# Patient Record
Sex: FEMALE | Race: WHITE | NOT HISPANIC OR LATINO | Employment: FULL TIME | URBAN - METROPOLITAN AREA
[De-identification: names, ages, dates, MRNs, and addresses within clinical notes are randomized per-mention and may not be internally consistent; named-entity substitution may affect disease eponyms.]

---

## 2018-05-01 ENCOUNTER — OFFICE VISIT (OUTPATIENT)
Dept: OBGYN CLINIC | Facility: CLINIC | Age: 58
End: 2018-05-01
Payer: COMMERCIAL

## 2018-05-01 VITALS — DIASTOLIC BLOOD PRESSURE: 96 MMHG | WEIGHT: 271.5 LBS | SYSTOLIC BLOOD PRESSURE: 146 MMHG

## 2018-05-01 DIAGNOSIS — Z01.419 ENCOUNTER FOR ANNUAL ROUTINE GYNECOLOGICAL EXAMINATION: Primary | ICD-10-CM

## 2018-05-01 DIAGNOSIS — Z12.31 ENCOUNTER FOR SCREENING MAMMOGRAM FOR MALIGNANT NEOPLASM OF BREAST: ICD-10-CM

## 2018-05-01 PROCEDURE — S0612 ANNUAL GYNECOLOGICAL EXAMINA: HCPCS | Performed by: OBSTETRICS & GYNECOLOGY

## 2018-05-01 NOTE — PROGRESS NOTES
ASSESSMENT & PLAN: Annita Okeefe is a 66 y o  [id number] with normal gynecologic exam     1   Routine well woman exam done today  2  Pap and HPV:  The patient's Pap and cotesting was not done today  Current ASCCP Guidelines reviewed  Had Hysterectomy 20 years ago for benign reasons   3  Mammogram ordered  4  Colonoscopy declined   5  The following were reviewed in today's visit: breast self exam, mammography screening ordered, menopause, exercise and healthy diet    CC:  Annual Gynecologic Examination    HPI: Annita Okeefe is a 66 y o  [id number] who presents for annual gynecologic examination  She has the following concerns:  None     Health Maintenance:    She wears her seatbelt routinely  She does perform regular monthly self breast exams  She feels safe at home  History reviewed  No pertinent past medical history  Past Surgical History:   Procedure Laterality Date    TOTAL ABDOMINAL HYSTERECTOMY      TUBAL LIGATION      WISDOM TOOTH EXTRACTION         Past OB/Gyn History:  OB History      Para Term  AB Living    6 3 3   3 3    SAB TAB Ectopic Multiple Live Births    1 2     3          History reviewed  No pertinent family history  Social History:  Social History     Social History    Marital status: Single     Spouse name: N/A    Number of children: N/A    Years of education: N/A     Occupational History    Not on file       Social History Main Topics    Smoking status: Never Smoker    Smokeless tobacco: Never Used    Alcohol use No    Drug use: No    Sexual activity: Yes     Partners: Male     Other Topics Concern    Not on file     Social History Narrative    No narrative on file       No Known Allergies    Current Outpatient Prescriptions:     LOW-DOSE ASPIRIN PO, Take by mouth, Disp: , Rfl:     Multiple Vitamins-Minerals (MULTIVITAMIN ADULT PO), Take by mouth, Disp: , Rfl:       Review of Systems  Constitutional :no fever, feels well, no tiredness, no recent weight gain or loss  ENT: no ear ache, no loss of hearing, no nosebleeds or nasal discharge, no sore throat or hoarseness  Cardiovascular: no complaints of slow or fast heart beat, no chest pain, no palpitations, no leg claudication or lower extremity edema  Respiratory: no complaints of shortness of shortness of breath, no MCMANUS  Breasts:no complaints of breast pain, breast lump, or nipple discharge  Gastrointestinal: no complaints of abdominal pain, constipation, nausea, vomiting, or diarrhea or bloody stools  Genitourinary : no complaints of dysuria, incontinence, pelvic pain, no dysmenorrhea, vaginal discharge or abnormal vaginal bleeding and as noted in HPI  Musculoskeletal: no complaints of arthralgia, no myalgia, no joint swelling or stiffness, no limb pain or swelling  Integumentary: no complaints of skin rash or lesion, itching or dry skin  Neurological: no complaints of headache, no confusion, no numbness or tingling, no dizziness or fainting    Objective      /96   Wt 123 kg (271 lb 8 oz)   Breastfeeding? No     General:   appears stated age, cooperative, alert normal mood and affect   Neck: normal, supple,trachea midline, no masses   Heart: regular rate and rhythm, S1, S2 normal, no murmur, click, rub or gallop   Lungs: clear to auscultation bilaterally   Breasts: normal appearance, no masses or tenderness, Normal to palpation without dominant masses   Abdomen: soft, non-tender, without masses or organomegaly   Vulva: normal, no lesions   Vagina: normal vagina, no discharge, exudate, lesion, or erythema   Urethra: normal   Cervix: Absent  Uterus: uterus absent   Adnexa: no mass, fullness, tenderness   Lymphatic palpation of lymph nodes in neck, axilla, groin and/or other locations: no lymphadenopathy or masses noted   Skin normal skin turgor and no rashes     Psychiatric orientation to person, place, and time: normal  mood and affect: normal

## 2018-05-02 PROBLEM — E55.9 VITAMIN D DEFICIENCY: Status: ACTIVE | Noted: 2018-05-02

## 2018-05-02 NOTE — PROGRESS NOTES
Assessment/Plan:    No problem-specific Assessment & Plan notes found for this encounter  Diagnoses and all orders for this visit:    S/p partial hysterectomy with remaining cervical stump  Comments:  Pt is following with Jossie Dean of GYN    Elevated BP without diagnosis of hypertension  Comments:  Bp 150/98 today pt reports it was high as reported by Gyn last week  Pt advised to return with bp log from home 3 weeks    Vitamin D deficiency  Comments:  Labwork ordered  Orders:  -     Vitamin D 25 hydroxy; Future    Screen for colon cancer  Comments:  Pt declines GI referral   Pt agrees with FOBT test    Other fatigue  Comments:  Labwork ordered  Orders:  -     TSH, 3rd generation with T4 reflex; Future    Encounter for preventative adult health care examination  Comments:  Labwork ordered  Orders:  -     CBC and differential; Future  -     Comprehensive metabolic panel    Screening for hyperlipidemia  Comments:  Labwork ordered  Orders:  -     UA w Reflex to Microscopic w Reflex to Culture  -     Lipid panel; Future    Screening for diabetes mellitus  Comments:  Labwork ordered  Orders:  -     Comprehensive metabolic panel    Screening for colon cancer  Comments:  Pt declined GI referral but is agreeable to FOBT test  Orders:  -     Occult Bloood,Fecal Immunochemical; Future  -     Ambulatory referral to Gastroenterology; Future    Screening for breast cancer  -     Mammo diagnostic bilateral w cad; Future    Other orders  -     Cancel: Ambulatory referral to Gynecology; Future          Subjective:      Patient ID: Renay Whittaker is a 66 y o  female here to [facility] pt reports she has not been following with a PCP for years     Pt reports she had a visit with Jossie Dean for gyn and was advised that since she does not have a uterus she does not need one      HPI    The following portions of the patient's history were reviewed and updated as appropriate:   She  has no past medical history on file   She   Patient Active Problem List    Diagnosis Date Noted    S/p partial hysterectomy with remaining cervical stump 05/03/2018    Vitamin D deficiency 05/02/2018     She  has a past surgical history that includes Total abdominal hysterectomy; [place] tooth extraction; and Tubal ligation  Her family history includes Hypertension in her father  She  reports that she has never smoked  She has never used smokeless tobacco  She reports that she does not drink alcohol or use drugs  Current Outpatient Prescriptions   Medication Sig Dispense Refill    LOW-DOSE ASPIRIN PO Take by mouth      Multiple Vitamins-Minerals (MULTIVITAMIN ADULT PO) Take by mouth       No current facility-administered medications for this visit  Current Outpatient Prescriptions on File Prior to Visit   Medication Sig    LOW-DOSE ASPIRIN PO Take by mouth    Multiple Vitamins-Minerals (MULTIVITAMIN ADULT PO) Take by mouth     No current facility-administered medications on file prior to visit  She has No Known Allergies       Review of Systems   Constitutional: Negative for fatigue  HENT: Negative for congestion, postnasal drip, rhinorrhea, sinus pain, sore throat and trouble swallowing  Eyes: Negative for pain and visual disturbance  Respiratory: Negative for cough, shortness of breath and wheezing  Cardiovascular: Negative for chest pain and palpitations  Gastrointestinal: Negative for constipation, diarrhea, nausea and vomiting  Endocrine: Negative for polydipsia, polyphagia and polyuria  Genitourinary: Negative for difficulty urinating, flank pain, frequency and pelvic pain  Musculoskeletal: Negative for arthralgias, back pain, joint swelling and myalgias  Skin: Negative  Negative for color change and rash  Neurological: Negative for dizziness, syncope, weakness, light-headedness, numbness and headaches  Hematological: Negative for adenopathy  Does not bruise/bleed easily     Psychiatric/Behavioral: Negative for behavioral problems and sleep disturbance  The patient is not nervous/anxious  Objective:      /98 (BP Location: Left arm, Patient Position: Sitting, Cuff Size: Large)   Pulse 82   Temp 98 8 °F (37 1 °C) (Tympanic)   Ht 5' 5" (1 651 m)   Wt 123 kg (272 lb)   SpO2 96%   BMI 45 26 kg/m²          Physical Exam   Constitutional: She is oriented to person, place, and time  She appears well-developed and well-nourished  HENT:   Head: Normocephalic  Eyes: Pupils are equal, round, and reactive to light  Neck: Normal range of motion  Cardiovascular: Normal rate and regular rhythm  Pulmonary/Chest: Effort normal and breath sounds normal    Abdominal: Soft  Bowel sounds are normal    Musculoskeletal: Normal range of motion  Neurological: She is alert and oriented to person, place, and time  Skin: Skin is warm and dry  Psychiatric: She has a normal mood and affect  Her behavior is normal  Judgment and thought content normal    Nursing note and vitals reviewed

## 2018-05-03 ENCOUNTER — OFFICE VISIT (OUTPATIENT)
Dept: FAMILY MEDICINE CLINIC | Facility: CLINIC | Age: 58
End: 2018-05-03
Payer: COMMERCIAL

## 2018-05-03 VITALS
SYSTOLIC BLOOD PRESSURE: 150 MMHG | HEART RATE: 82 BPM | HEIGHT: 65 IN | WEIGHT: 272 LBS | OXYGEN SATURATION: 96 % | BODY MASS INDEX: 45.32 KG/M2 | TEMPERATURE: 98.8 F | DIASTOLIC BLOOD PRESSURE: 98 MMHG

## 2018-05-03 DIAGNOSIS — Z90.711 S/P PARTIAL HYSTERECTOMY WITH REMAINING CERVICAL STUMP: Primary | ICD-10-CM

## 2018-05-03 DIAGNOSIS — R03.0 ELEVATED BP WITHOUT DIAGNOSIS OF HYPERTENSION: ICD-10-CM

## 2018-05-03 DIAGNOSIS — Z12.11 SCREENING FOR COLON CANCER: ICD-10-CM

## 2018-05-03 DIAGNOSIS — R53.83 OTHER FATIGUE: ICD-10-CM

## 2018-05-03 DIAGNOSIS — Z12.11 SCREEN FOR COLON CANCER: ICD-10-CM

## 2018-05-03 DIAGNOSIS — Z13.1 SCREENING FOR DIABETES MELLITUS: ICD-10-CM

## 2018-05-03 DIAGNOSIS — E55.9 VITAMIN D DEFICIENCY: ICD-10-CM

## 2018-05-03 DIAGNOSIS — Z00.00 ENCOUNTER FOR PREVENTATIVE ADULT HEALTH CARE EXAMINATION: ICD-10-CM

## 2018-05-03 DIAGNOSIS — Z13.220 SCREENING FOR HYPERLIPIDEMIA: ICD-10-CM

## 2018-05-03 DIAGNOSIS — Z12.39 SCREENING FOR BREAST CANCER: ICD-10-CM

## 2018-05-03 PROCEDURE — 99204 OFFICE O/P NEW MOD 45 MIN: CPT | Performed by: NURSE PRACTITIONER

## 2018-05-03 NOTE — PATIENT INSTRUCTIONS
Please return with bp log to next visit      DASH Eating Plan   WHAT YOU NEED TO KNOW:   The DASH (Dietary Approaches to Stop Hypertension) Eating Plan is designed to help prevent or lower high blood pressure  It can also help to lower LDL (bad) cholesterol and decrease your risk of heart disease  The plan is low in sodium, sugar, unhealthy fats, and total fat  It is high in potassium, calcium, magnesium, and fiber  These nutrients are added when you eat more fruits, vegetables, and whole grains  DISCHARGE INSTRUCTIONS:   Your sodium limit each day: Your dietitian will tell you how much sodium is safe for you to have each day  People with high blood pressure should have no more than 1,500 to 2,300 mg of sodium in a day  A teaspoon (tsp) of salt has 2,300 mg of sodium  This may seem like a difficult goal, but small changes to the foods you eat can make a big difference  Your healthcare provider or dietitian can help you create a meal plan that follows your sodium limit  How to limit sodium:   · Read food labels  Food labels can help you choose foods that are low in sodium  The amount of sodium is listed in milligrams (mg)  The % Daily Value (DV) column tells you how much of your daily needs are met by 1 serving of the food for each nutrient listed  Choose foods that have less than 5% of the DV of sodium  These foods are considered low in sodium  Foods that have 20% or more of the DV of sodium are considered high in sodium  Avoid foods that have more than 300 mg of sodium in each serving  Choose foods that say low-sodium, reduced-sodium, or no salt added on the food label  · Avoid salt  Do not salt food at the table, and add very little salt to foods during cooking  Use herbs and spices, such as onions, garlic, and salt-free seasonings to add flavor to foods  Try lemon or lime juice or vinegar to give foods a tart flavor   Use hot peppers or a small amount of hot pepper sauce to add a spicy flavor to foods      · Ask about salt substitutes  Ask your healthcare provider if you may use salt substitutes  Some salt substitutes have ingredients that can be harmful if you have certain health conditions  · Choose foods carefully at restaurants  Meals from restaurants, especially fast food restaurants, are often high in sodium  Some restaurants have nutrition information that tells you the amount of sodium in their foods  Ask to have your food prepared with less, or no salt  What you need to know about fats:   · Include healthy fats  Examples are unsaturated fats and omega-3 fatty acids  Unsaturated fats are found in soybean, canola, olive, or sunflower oil, and liquid and soft tub margarines  Omega-3 fatty acids are found in fatty fish, such as salmon, tuna, mackerel, and sardines  It is also found in flaxseed oil and ground flaxseed  · Avoid unhealthy fats  Do not eat unhealthy fats, such as saturated fats and trans fats  Saturated fats are found in foods that contain fat from animals  Examples are fatty meats, whole milk, butter, cream, and other dairy foods  It is also found in shortening, stick margarine, palm oil, and coconut oil  Trans fats are found in fried foods, crackers, chips, and baked goods made with margarine or shortening  Foods to include: With the DASH eating plan, you need to eat a certain number of servings from each food group  This will help you get enough of certain nutrients and limit others  The amount of servings you should eat depends on how many calories you need  Your dietitian can tell you how many calories you need  The number of servings listed next to the food groups below are for people who need about 2,000 calories each day    · Grains:  6 to 8 servings (3 of these servings should be whole-grain foods)    ¨ 1 slice of whole-grain bread     ¨ 1 ounce of dry cereal    ¨ ½ cup of cooked cereal, pasta, or brown rice    · Vegetables and fruits:  4 to 5 servings of fruits and 4 to 5 servings of vegetables    ¨ 1 medium fruit    ¨ ½ cup of frozen, canned (no added salt), or chopped fresh vegetables     ¨ ½ cup of fresh, frozen, dried, or canned fruit (canned in light syrup or fruit juice)    ¨ ½ cup of vegetable or fruit juice    · Dairy:  2 to 3 servings    ¨ 1 cup of nonfat (skim) or 1% milk    ¨ 1½ ounces of fat-free or low-fat cheese    ¨ 6 ounces of nonfat or low-fat yogurt    · Lean meat, poultry, and fish:  6 ounces or less    [employer] (chicken, turkey) with no skin    ¨ Fish (especially fatty fish, such as salmon, fresh tuna, or mackerel)    ¨ Lean beef and pork (loin, round, extra lean hamburger)    ¨ Egg whites and egg substitutes    · Nuts, seeds, and legumes:  4 to 5 servings each week    ¨ ½ cup of cooked beans and peas    ¨ 1½ ounces of unsalted nuts    ¨ 2 tablespoons of peanut butter or seeds    · Sweets and added sugars:  5 or less each week    ¨ 1 tablespoon of sugar, jelly, or jam    ¨ ½ cup of sorbet or gelatin    ¨ 1 cup of lemonade    · Fats:  2 to 3 servings each week    ¨ 1 teaspoon of soft margarine or vegetable oil    ¨ 1 tablespoon of mayonnaise    ¨ 2 tablespoons of salad dressing  Foods to avoid:   · Grains:      [employer], such as doughnuts, pastries, cookies, and biscuits (high in fat and sugar)    ¨ Mixes for cornbread and biscuits, packaged foods, such as bread stuffing, rice and pasta mixes, macaroni and cheese, and instant cereals (high in sodium)    · Fruits and vegetables:      ¨ Regular, canned vegetables (high in sodium)    ¨ Sauerkraut, pickled vegetables, and other foods prepared in brine (high in sodium)    ¨ Fried vegetables or vegetables in butter or high-fat sauces    ¨ Fruit in cream or butter sauce (high in fat)    · Dairy:      ¨ Whole milk, 2% milk, and cream (high in fat)    ¨ Regular cheese and processed cheese (high in fat and sodium)    · Meats and protein foods:      ¨ Smoked or cured meat, such as corned beef, montoya, ham, hot dogs, and sausage (high in fat and sodium)    ¨ Canned beans and canned meats or spreads, such as potted meats, sardines, anchovies, and imitation seafood (high in sodium)    ¨ Deli or lunch meats, such as bologna, ham, turkey, and roast beef (high in sodium)    ¨ High-fat meat (T-bone steak, regular hamburger, and ribs)    ¨ Whole eggs and egg yolks (high in fat)    · Other:      ¨ Seasonings made with salt, such as garlic salt, celery salt, onion salt, seasoned salt, meat tenderizers, and monosodium glutamate (MSG)    ¨ Miso soup and canned or dried soup mixes (high in sodium)    ¨ Regular soy sauce, barbecue sauce, teriyaki sauce, steak sauce, Worcestershire sauce, and most flavored vinegars (high in sodium)    ¨ Regular condiments, such as mustard, ketchup, and salad dressings (high in sodium)    ¨ Gravy and sauces, such as Earl or cheese sauces (high in sodium and fat)    ¨ Drinks high in sugar, such as soda or fruit drinks    [employer] foods, such as salted chips, popcorn, pretzels, pork rinds, salted crackers, and salted nuts    ¨ Frozen foods, such as dinners, entrees, vegetables with sauces, and breaded meats (high in sodium)  Other guidelines to follow:   · Maintain a healthy weight  Your risk for heart disease is higher if you are overweight  Your healthcare provider may suggest that you lose weight if you are overweight  You can lose weight by eating fewer calories and foods that have added sugars and fat  The DASH meal plan can help you do this  Decrease calories by eating smaller portions at each meal and fewer snacks  Ask your healthcare provider for more information about how to lose weight  · Exercise regularly  Regular exercise can help you reach or maintain a healthy weight  Regular exercise can also help decrease your blood pressure and improve your cholesterol levels  Get 30 minutes or more of moderate exercise each day of the week  To lose weight, get at least 60 minutes of exercise   Talk to your healthcare provider about the best exercise program for you  · Limit alcohol  Women should limit alcohol to 1 drink a day  Men should limit alcohol to 2 drinks a day  A drink of alcohol is 12 ounces of beer, 5 ounces of wine, or 1½ ounces of liquor  © 2017 [address] Information is for End User's use only and may not be sold, redistributed or otherwise used for commercial purposes  All illustrations and images included in CareNotes® are the copyrighted property of A D A M [employer] Inc  or Evan Douglass  The above information is an  only  It is not intended as medical advice for individual conditions or treatments  Talk to your doctor, nurse or pharmacist before following any medical regimen to see if it is safe and effective for you

## 2018-05-09 ENCOUNTER — APPOINTMENT (OUTPATIENT)
Dept: LAB | Facility: HOSPITAL | Age: 58
End: 2018-05-09
Payer: COMMERCIAL

## 2018-05-09 DIAGNOSIS — Z13.220 SCREENING FOR HYPERLIPIDEMIA: ICD-10-CM

## 2018-05-09 DIAGNOSIS — Z00.00 ENCOUNTER FOR PREVENTATIVE ADULT HEALTH CARE EXAMINATION: ICD-10-CM

## 2018-05-09 DIAGNOSIS — E55.9 VITAMIN D DEFICIENCY: ICD-10-CM

## 2018-05-09 DIAGNOSIS — R53.83 OTHER FATIGUE: ICD-10-CM

## 2018-05-09 LAB
25(OH)D3 SERPL-MCNC: 11.5 NG/ML (ref 30–100)
ALBUMIN SERPL BCP-MCNC: 3.9 G/DL (ref 3.5–5)
ALP SERPL-CCNC: 104 U/L (ref 46–116)
ALT SERPL W P-5'-P-CCNC: 31 U/L (ref 12–78)
ANION GAP SERPL CALCULATED.3IONS-SCNC: 9 MMOL/L (ref 4–13)
AST SERPL W P-5'-P-CCNC: 23 U/L (ref 5–45)
BASOPHILS # BLD AUTO: 0.04 THOUSANDS/ΜL (ref 0–0.1)
BASOPHILS NFR BLD AUTO: 1 % (ref 0–1)
BILIRUB SERPL-MCNC: 0.4 MG/DL (ref 0.2–1)
BILIRUB UR QL STRIP: NEGATIVE
BUN SERPL-MCNC: 14 MG/DL (ref 5–25)
CALCIUM SERPL-MCNC: 9.4 MG/DL (ref 8.3–10.1)
CHLORIDE SERPL-SCNC: 106 MMOL/L (ref 100–108)
CHOLEST SERPL-MCNC: 284 MG/DL (ref 50–200)
CLARITY UR: CLEAR
CO2 SERPL-SCNC: 29 MMOL/L (ref 21–32)
COLOR UR: YELLOW
CREAT SERPL-MCNC: 1.01 MG/DL (ref 0.6–1.3)
EOSINOPHIL # BLD AUTO: 0.12 THOUSAND/ΜL (ref 0–0.61)
EOSINOPHIL NFR BLD AUTO: 2 % (ref 0–6)
ERYTHROCYTE [DISTWIDTH] IN BLOOD BY AUTOMATED COUNT: 13.5 % (ref 11.6–15.1)
GFR SERPL CREATININE-BSD FRML MDRD: 62 ML/MIN/1.73SQ M
GLUCOSE P FAST SERPL-MCNC: 80 MG/DL (ref 65–99)
GLUCOSE UR STRIP-MCNC: NEGATIVE MG/DL
HCT VFR BLD AUTO: 51.4 % (ref 34.8–46.1)
HDLC SERPL-MCNC: 64 MG/DL (ref 40–60)
HGB BLD-MCNC: 17.7 G/DL (ref 11.5–15.4)
HGB UR QL STRIP.AUTO: NEGATIVE
KETONES UR STRIP-MCNC: NEGATIVE MG/DL
LDLC SERPL CALC-MCNC: 191 MG/DL (ref 0–100)
LEUKOCYTE ESTERASE UR QL STRIP: NEGATIVE
LYMPHOCYTES # BLD AUTO: 2.27 THOUSANDS/ΜL (ref 0.6–4.47)
LYMPHOCYTES NFR BLD AUTO: 42 % (ref 14–44)
MCH RBC QN AUTO: 29.6 PG (ref 26.8–34.3)
MCHC RBC AUTO-ENTMCNC: 34.4 G/DL (ref 31.4–37.4)
MCV RBC AUTO: 86 FL (ref 82–98)
MONOCYTES # BLD AUTO: 0.34 THOUSAND/ΜL (ref 0.17–1.22)
MONOCYTES NFR BLD AUTO: 6 % (ref 4–12)
NEUTROPHILS # BLD AUTO: 2.61 THOUSANDS/ΜL (ref 1.85–7.62)
NEUTS SEG NFR BLD AUTO: 49 % (ref 43–75)
NITRITE UR QL STRIP: NEGATIVE
NONHDLC SERPL-MCNC: 220 MG/DL
PH UR STRIP.AUTO: 7 [PH] (ref 4.5–8)
PLATELET # BLD AUTO: 191 THOUSANDS/UL (ref 149–390)
PMV BLD AUTO: 9.7 FL (ref 8.9–12.7)
POTASSIUM SERPL-SCNC: 3.7 MMOL/L (ref 3.5–5.3)
PROT SERPL-MCNC: 7 G/DL (ref 6.4–8.2)
PROT UR STRIP-MCNC: NEGATIVE MG/DL
RBC # BLD AUTO: 5.98 MILLION/UL (ref 3.81–5.12)
SODIUM SERPL-SCNC: 144 MMOL/L (ref 136–145)
SP GR UR STRIP.AUTO: 1.01 (ref 1–1.03)
T4 FREE SERPL-MCNC: 1.17 NG/DL (ref 0.76–1.46)
TRIGL SERPL-MCNC: 145 MG/DL
TSH SERPL DL<=0.05 MIU/L-ACNC: 5.66 UIU/ML (ref 0.36–3.74)
UROBILINOGEN UR QL STRIP.AUTO: 0.2 E.U./DL
WBC # BLD AUTO: 5.38 THOUSAND/UL (ref 4.31–10.16)

## 2018-05-09 PROCEDURE — 80061 LIPID PANEL: CPT

## 2018-05-09 PROCEDURE — 81003 URINALYSIS AUTO W/O SCOPE: CPT | Performed by: NURSE PRACTITIONER

## 2018-05-09 PROCEDURE — 84439 ASSAY OF FREE THYROXINE: CPT

## 2018-05-09 PROCEDURE — 36415 COLL VENOUS BLD VENIPUNCTURE: CPT

## 2018-05-09 PROCEDURE — 84443 ASSAY THYROID STIM HORMONE: CPT

## 2018-05-09 PROCEDURE — 82306 VITAMIN D 25 HYDROXY: CPT

## 2018-05-09 PROCEDURE — 85025 COMPLETE CBC W/AUTO DIFF WBC: CPT

## 2018-05-09 PROCEDURE — 80053 COMPREHEN METABOLIC PANEL: CPT | Performed by: NURSE PRACTITIONER

## 2018-05-15 ENCOUNTER — HOSPITAL ENCOUNTER (OUTPATIENT)
Dept: MAMMOGRAPHY | Facility: HOSPITAL | Age: 58
Discharge: HOME | End: 2018-05-15
Attending: OBSTETRICS & GYNECOLOGY
Payer: COMMERCIAL

## 2018-05-15 DIAGNOSIS — Z12.31 ENCOUNTER FOR SCREENING MAMMOGRAM FOR MALIGNANT NEOPLASM OF BREAST: ICD-10-CM

## 2018-05-15 PROCEDURE — 77067 SCR MAMMO BI INCL CAD: CPT

## 2018-05-15 PROCEDURE — 77063 BREAST TOMOSYNTHESIS BI: CPT

## 2018-05-24 ENCOUNTER — OFFICE VISIT (OUTPATIENT)
Dept: FAMILY MEDICINE CLINIC | Facility: CLINIC | Age: 58
End: 2018-05-24
Payer: COMMERCIAL

## 2018-05-24 VITALS
DIASTOLIC BLOOD PRESSURE: 90 MMHG | TEMPERATURE: 98.4 F | BODY MASS INDEX: 46.52 KG/M2 | SYSTOLIC BLOOD PRESSURE: 142 MMHG | HEIGHT: 65 IN | WEIGHT: 279.2 LBS | OXYGEN SATURATION: 97 % | HEART RATE: 73 BPM

## 2018-05-24 DIAGNOSIS — E55.9 VITAMIN D DEFICIENCY: ICD-10-CM

## 2018-05-24 DIAGNOSIS — E03.9 ACQUIRED HYPOTHYROIDISM: ICD-10-CM

## 2018-05-24 DIAGNOSIS — D75.1 POLYCYTHEMIA: ICD-10-CM

## 2018-05-24 DIAGNOSIS — E78.2 MIXED HYPERLIPIDEMIA: ICD-10-CM

## 2018-05-24 DIAGNOSIS — I10 ELEVATED BLOOD PRESSURE READING IN OFFICE WITH WHITE COAT SYNDROME, WITH DIAGNOSIS OF HYPERTENSION: Primary | ICD-10-CM

## 2018-05-24 PROCEDURE — 99214 OFFICE O/P EST MOD 30 MIN: CPT | Performed by: NURSE PRACTITIONER

## 2018-05-24 PROCEDURE — 3008F BODY MASS INDEX DOCD: CPT | Performed by: NURSE PRACTITIONER

## 2018-05-24 RX ORDER — ERGOCALCIFEROL 1.25 MG/1
50000 CAPSULE ORAL WEEKLY
Qty: 12 CAPSULE | Refills: 3 | Status: SHIPPED | OUTPATIENT
Start: 2018-05-24 | End: 2018-11-21 | Stop reason: SDUPTHER

## 2018-05-24 RX ORDER — LEVOTHYROXINE SODIUM 0.03 MG/1
25 TABLET ORAL DAILY
Qty: 30 TABLET | Refills: 5 | Status: SHIPPED | OUTPATIENT
Start: 2018-05-24 | End: 2018-11-19 | Stop reason: SDUPTHER

## 2018-05-24 RX ORDER — ROSUVASTATIN CALCIUM 10 MG/1
10 TABLET, COATED ORAL DAILY
Qty: 30 TABLET | Refills: 5 | Status: SHIPPED | OUTPATIENT
Start: 2018-05-24 | End: 2018-11-21 | Stop reason: SDUPTHER

## 2018-05-24 NOTE — PROGRESS NOTES
Assessment/Plan:    No problem-specific Assessment & Plan notes found for this encounter  Diagnoses and all orders for this visit:    Elevated blood pressure reading in office with white coat syndrome, with diagnosis of hypertension  Comments:  Pt did monitor her bp at home avg 122/70 although elevated in the office 142/90 will cont to monitor    Mixed hyperlipidemia  Comments: Total chol 298    Pt requested medication at this time, is experiencing difficulty losing weight and excess fatigue  will start CrestorLabwork ordered 6   Orders:  -     Lipid panel; Future  -     rosuvastatin (CRESTOR) 10 MG tablet; Take 1 tablet (10 mg total) by mouth daily    Vitamin D deficiency  Comments:  Vitamin D def 11 7 Rx for Vitamin D 50,000 weekly provided  Orders:  -     ergocalciferol (VITAMIN D2) 50,000 units; Take 1 capsule (50,000 Units total) by mouth once a week  -     Vitamin D 25 hydroxy; Future    Acquired hypothyroidism  Comments:  TSH 5 65 Rx for Levothyroxine provided  Labwork ordered 6 mos  Orders:  -     levothyroxine 25 mcg tablet; Take 1 tablet (25 mcg total) by mouth daily  -     TSH, 3rd generation with T4 reflex; Future    Polycythemia  Comments:  Hgb 17 7   Hct 51 4  Labwork ordered 6 mos- will cont to monitor  Orders:  -     CBC (Includes Diff/PLT) With Smear Review; Future          Subjective:      Patient ID: Srinivasa Rai is a 66 y o  female here for f/u review of labs  Vitamin D def noted  Hypothyroidism noted  Elevated H&H Polycythemia  Will repeat labs in 6 mos    HPI    The following portions of the patient's history were reviewed and updated as appropriate:   She  has no past medical history on file    She   Patient Active Problem List    Diagnosis Date Noted    Elevated blood pressure reading in office with white coat syndrome, with diagnosis of hypertension 05/24/2018    Mixed hyperlipidemia 05/24/2018    Polycythemia 05/24/2018    S/p partial hysterectomy with remaining cervical stump 05/03/2018    Vitamin D deficiency 05/02/2018     She  has a past surgical history that includes Total abdominal hysterectomy; [place] tooth extraction; and Tubal ligation  Her family history includes Hypertension in her father  She  reports that she has never smoked  She has never used smokeless tobacco  She reports that she does not drink alcohol or use drugs  Current Outpatient Prescriptions   Medication Sig Dispense Refill    LOW-DOSE ASPIRIN PO Take by mouth      Multiple Vitamins-Minerals (MULTIVITAMIN ADULT PO) Take by mouth      ergocalciferol (VITAMIN D2) 50,000 units Take 1 capsule (50,000 Units total) by mouth once a week 12 capsule 3    levothyroxine 25 mcg tablet Take 1 tablet (25 mcg total) by mouth daily 30 tablet 5    rosuvastatin (CRESTOR) 10 MG tablet Take 1 tablet (10 mg total) by mouth daily 30 tablet 5     No current facility-administered medications for this visit  Current Outpatient Prescriptions on File Prior to Visit   Medication Sig    LOW-DOSE ASPIRIN PO Take by mouth    Multiple Vitamins-Minerals (MULTIVITAMIN ADULT PO) Take by mouth     No current facility-administered medications on file prior to visit  She has No Known Allergies       Review of Systems   Constitutional: Positive for fatigue  HENT: Negative for congestion, postnasal drip, rhinorrhea, sinus pain, sore throat and trouble swallowing  Eyes: Negative for pain and visual disturbance  Respiratory: Negative for cough, shortness of breath and wheezing  Cardiovascular: Negative for chest pain and palpitations  Gastrointestinal: Negative for constipation, diarrhea, nausea and vomiting  Endocrine: Negative for polydipsia, polyphagia and polyuria  Genitourinary: Negative for difficulty urinating, flank pain, frequency and pelvic pain  Musculoskeletal: Negative for arthralgias, back pain, joint swelling and myalgias  Skin: Negative  Negative for color change and rash  Neurological: Negative for dizziness, syncope, weakness, light-headedness, numbness and headaches  Hematological: Negative for adenopathy  Does not bruise/bleed easily  Psychiatric/Behavioral: Negative for behavioral problems and sleep disturbance  The patient is not nervous/anxious  Objective:      /90 (BP Location: Right arm, Patient Position: Sitting, Cuff Size: Large)   Pulse 73   Temp 98 4 °F (36 9 °C) (Tympanic)   Ht 5' 5" (1 651 m)   Wt 127 kg (279 lb 3 2 oz)   SpO2 97%   BMI 46 46 kg/m²          Physical Exam   Constitutional: She is oriented to person, place, and time  She appears well-developed and well-nourished  HENT:   Head: Normocephalic  Eyes: Pupils are equal, round, and reactive to light  Neck: Normal range of motion  Cardiovascular: Normal rate and regular rhythm  Pulmonary/Chest: Effort normal and breath sounds normal    Abdominal: Soft  Bowel sounds are normal    Musculoskeletal: Normal range of motion  Neurological: She is alert and oriented to person, place, and time  Skin: Skin is warm and dry  Psychiatric: She has a normal mood and affect  Her behavior is normal  Judgment and thought content normal    Nursing note and vitals reviewed

## 2018-05-24 NOTE — PATIENT INSTRUCTIONS
Polycythemia Vera   AMBULATORY CARE:   Polycythemia vera (PV)  is a condition that causes your bone marrow to produce too many red blood cells (RBCs)  RBCs carry oxygen throughout the body  Too many white blood cells (WBCs) and platelets may also be produced  The extra blood cells make your blood thicker than normal  Blood that is too thick cannot flow easily, so less oxygen is delivered to your body's tissues  Left untreated, PV is life-threatening  PV is usually caused by a gene mutation (change)  Your risk for PV increases if you are male or older than 50 years  Common signs and symptoms of PV:  Signs and symptoms develop slowly, over many years  You may have any of the following:  · Chest pain or discomfort    · Fatigue, weakness, or weight loss without trying    · Shortness of breath, or trouble breathing when you lie down    · Pressure on the left side of your abdomen, abdominal pain, or diarrhea    · Bulging veins, or blue or purple skin on your face or mucus membranes    · Itching that may be intense, or numbness or burning pain in your feet or hands    · High blood pressure, headaches, dizziness, or blurred vision    · Blood clots, bruising, and bleeding problems    · Pain and swelling in a joint, usually in a big toe, or bone pain  Call 911 for any of the following:   · You have any of the following signs of a heart attack:      ¨ Squeezing, pressure, or pain in your chest that lasts longer than 5 minutes or returns    ¨ Discomfort or pain in your back, neck, jaw, stomach, or arm     ¨ Trouble breathing    ¨ Nausea or vomiting    ¨ Lightheadedness or a sudden cold sweat, especially with chest pain or trouble breathing    · You have any of the following signs of a stroke:      ¨ Numbness or drooping on one side of your face     ¨ Weakness in an arm or leg    ¨ Confusion or difficulty speaking    ¨ Dizziness, a severe headache, or vision loss    · You have a severe headache or a seizure       · You cough up blood   Seek care immediately if:   · Your arm or leg feels warm, tender, and painful  It may look swollen and red  · You have new or worsening symptoms  · You have heavy bleeding that does not stop after 20 minutes of applied pressure  Contact your healthcare provider if:   · Your nose or gums bleed  · You see blood in your urine  · You have a sore or skin changes on your hands or feet, or under your compression stocking  · You have more bruises than usual      · You have questions or concerns about your condition or care  Treatment:  PV cannot be cured  It will always need to be managed  The goal of treatment is improve symptoms and reduce the risk for problems such as blood clots  · Phlebotomy  is a procedure used to remove blood from your body  About a pint of blood is removed during a session  The procedure lowers the number of RBCs and thins the blood  Phlebotomy may be done every few days to every few months  · Medicines  may be given to thin your blood  This will help reduce blood clots  Medicines may also be given to reduce itching or uric acid, or to control stomach acid  Medicine is sometimes used to make bone marrow create fewer RBCs  · Aspirin  can help thin your blood, and relive bone pain and burning in your hands or feet  Aspirin can cause stomach bleeding in some people  Only take aspirin if directed by your healthcare provider  Do not take more than the recommended amount  · Radiation  is a procedure used to stop bone marrow cells from producing too many RBCs  Prevent blood clots:   · Do not smoke  Nicotine and other chemicals in cigarettes and cigars can increase your risk for blood clots and cause lung damage  Ask your healthcare provider for information if you currently smoke and need help to quit  E-cigarettes or smokeless tobacco still contain nicotine  Talk to your healthcare provider before you use these products  · Help keep your blood flowing    Wear support stockings as directed  Support stockings are tight and help push blood up and out of your leg veins  Elevate your feet when you sit  This will help prevent blood from pooling in your leg veins  · Exercise as directed  Exercise such as walking helps improve blood flow and prevents blood clots  Ask your healthcare provider which exercises are best for you  Stop if you feel any chest pain or shortness of breath  · Drink liquids as directed  Liquids help keep your blood thin  Your healthcare provider may recommend that you drink at least 3 liters (12 cups) of liquid each day  Ask which liquids are best for you  Manage your symptoms:   · Prevent bleeding and bruising  PV or blood thinners used to manage it can increase your risk for heavy bleeding and easy bruising  Use an electric razor and soft toothbrush  Floss your teeth gently  Do not play contact sports, such as football  These sports increase the risk for bruising  Get care immediately if you are injured  Ask about other ways to prevent bleeding and bruising  Tell all healthcare providers that you have PV or are taking blood thinners  · Protect your hands and feet  Wear gloves outside if the temperature is low  Check your hands and feet for sores caused by blood circulation problems  You may not feel the sores because of the low blood flow  · Avoid high temperatures  Do not take hot baths or sit in hot tubs  The heat can increase symptoms such as facial flushing and skin itching  Protect your skin when you are outside in hot weather  Do not use a tanning bed or sun lamp  These can damage your skin  · Take cool baths to relive itching  Itching can increase after a hot bath  Cool water may help relive itching  You may want to use oatmeal, corn starch, or a mild moisturizing soap to relieve dryness or itching  Lotion may also help relieve dry skin  Follow up with your healthcare provider as directed:   You may need ongoing tests or treatment  Write down your questions so you remember to ask them during your visits  © 2017 [address] [employer] Information is for End User's use only and may not be sold, redistributed or otherwise used for commercial purposes  All illustrations and images included in CareNotes® are the copyrighted property of [employer]  or [employer]  The above information is an  only  It is not intended as medical advice for individual conditions or treatments  Talk to your doctor, nurse or pharmacist before following any medical regimen to see if it is safe and effective for you

## 2018-05-29 DIAGNOSIS — D75.1 POLYCYTHEMIA: Primary | ICD-10-CM

## 2018-06-01 DIAGNOSIS — D75.1 POLYCYTHEMIA, SECONDARY: Primary | ICD-10-CM

## 2018-06-12 ENCOUNTER — TRANSCRIBE ORDERS (OUTPATIENT)
Dept: ADMINISTRATIVE | Facility: HOSPITAL | Age: 58
End: 2018-06-12

## 2018-06-12 DIAGNOSIS — D75.1 POLYCYTHEMIA, SECONDARY: Primary | ICD-10-CM

## 2018-06-20 ENCOUNTER — APPOINTMENT (OUTPATIENT)
Dept: LAB | Facility: HOSPITAL | Age: 58
End: 2018-06-20
Payer: COMMERCIAL

## 2018-06-20 ENCOUNTER — TRANSCRIBE ORDERS (OUTPATIENT)
Dept: ADMINISTRATIVE | Facility: HOSPITAL | Age: 58
End: 2018-06-20

## 2018-06-20 DIAGNOSIS — D75.1 ERYTHROCYTOSIS: Primary | ICD-10-CM

## 2018-06-20 DIAGNOSIS — D75.1 ERYTHROCYTOSIS: ICD-10-CM

## 2018-06-20 LAB
ALBUMIN SERPL BCP-MCNC: 3.8 G/DL (ref 3.5–5)
ALP SERPL-CCNC: 108 U/L (ref 46–116)
ALT SERPL W P-5'-P-CCNC: 32 U/L (ref 12–78)
ANION GAP SERPL CALCULATED.3IONS-SCNC: 11 MMOL/L (ref 4–13)
AST SERPL W P-5'-P-CCNC: 29 U/L (ref 5–45)
BASOPHILS # BLD AUTO: 0.03 THOUSANDS/ΜL (ref 0–0.1)
BASOPHILS NFR BLD AUTO: 1 % (ref 0–1)
BILIRUB SERPL-MCNC: 0.5 MG/DL (ref 0.2–1)
BUN SERPL-MCNC: 18 MG/DL (ref 5–25)
CALCIUM SERPL-MCNC: 9.5 MG/DL (ref 8.3–10.1)
CHLORIDE SERPL-SCNC: 107 MMOL/L (ref 100–108)
CO2 SERPL-SCNC: 25 MMOL/L (ref 21–32)
CREAT SERPL-MCNC: 1.12 MG/DL (ref 0.6–1.3)
EOSINOPHIL # BLD AUTO: 0.13 THOUSAND/ΜL (ref 0–0.61)
EOSINOPHIL NFR BLD AUTO: 2 % (ref 0–6)
ERYTHROCYTE [DISTWIDTH] IN BLOOD BY AUTOMATED COUNT: 13.4 % (ref 11.6–15.1)
GFR SERPL CREATININE-BSD FRML MDRD: 55 ML/MIN/1.73SQ M
GLUCOSE SERPL-MCNC: 93 MG/DL (ref 65–140)
HCT VFR BLD AUTO: 48.3 % (ref 34.8–46.1)
HGB BLD-MCNC: 16.7 G/DL (ref 11.5–15.4)
LYMPHOCYTES # BLD AUTO: 2.63 THOUSANDS/ΜL (ref 0.6–4.47)
LYMPHOCYTES NFR BLD AUTO: 41 % (ref 14–44)
MCH RBC QN AUTO: 29.9 PG (ref 26.8–34.3)
MCHC RBC AUTO-ENTMCNC: 34.6 G/DL (ref 31.4–37.4)
MCV RBC AUTO: 87 FL (ref 82–98)
MONOCYTES # BLD AUTO: 0.39 THOUSAND/ΜL (ref 0.17–1.22)
MONOCYTES NFR BLD AUTO: 6 % (ref 4–12)
NEUTROPHILS # BLD AUTO: 3.19 THOUSANDS/ΜL (ref 1.85–7.62)
NEUTS SEG NFR BLD AUTO: 50 % (ref 43–75)
PLATELET # BLD AUTO: 198 THOUSANDS/UL (ref 149–390)
PMV BLD AUTO: 9.5 FL (ref 8.9–12.7)
POTASSIUM SERPL-SCNC: 3.8 MMOL/L (ref 3.5–5.3)
PROT SERPL-MCNC: 7 G/DL (ref 6.4–8.2)
RBC # BLD AUTO: 5.58 MILLION/UL (ref 3.81–5.12)
SODIUM SERPL-SCNC: 143 MMOL/L (ref 136–145)
WBC # BLD AUTO: 6.37 THOUSAND/UL (ref 4.31–10.16)

## 2018-06-20 PROCEDURE — 36415 COLL VENOUS BLD VENIPUNCTURE: CPT

## 2018-06-20 PROCEDURE — 85025 COMPLETE CBC W/AUTO DIFF WBC: CPT

## 2018-06-20 PROCEDURE — 81270 JAK2 GENE: CPT

## 2018-06-20 PROCEDURE — 80053 COMPREHEN METABOLIC PANEL: CPT

## 2018-06-20 PROCEDURE — 82668 ASSAY OF ERYTHROPOIETIN: CPT

## 2018-06-21 LAB — EPO SERPL-ACNC: 17.5 MIU/ML (ref 2.6–18.5)

## 2018-06-28 LAB — SCAN RESULT: NORMAL

## 2018-11-19 DIAGNOSIS — E03.9 ACQUIRED HYPOTHYROIDISM: ICD-10-CM

## 2018-11-20 RX ORDER — LEVOTHYROXINE SODIUM 0.03 MG/1
25 TABLET ORAL DAILY
Qty: 30 TABLET | Refills: 5 | Status: SHIPPED | OUTPATIENT
Start: 2018-11-20 | End: 2018-11-29 | Stop reason: SDUPTHER

## 2018-11-21 DIAGNOSIS — E55.9 VITAMIN D DEFICIENCY: ICD-10-CM

## 2018-11-21 DIAGNOSIS — E78.2 MIXED HYPERLIPIDEMIA: ICD-10-CM

## 2018-11-21 RX ORDER — ERGOCALCIFEROL 1.25 MG/1
50000 CAPSULE ORAL WEEKLY
Qty: 12 CAPSULE | Refills: 0 | Status: SHIPPED | OUTPATIENT
Start: 2018-11-21 | End: 2018-12-27 | Stop reason: SDUPTHER

## 2018-11-21 RX ORDER — ROSUVASTATIN CALCIUM 10 MG/1
10 TABLET, COATED ORAL DAILY
Qty: 30 TABLET | Refills: 5 | Status: SHIPPED | OUTPATIENT
Start: 2018-11-21 | End: 2019-05-17 | Stop reason: SDUPTHER

## 2018-11-23 ENCOUNTER — APPOINTMENT (OUTPATIENT)
Dept: LAB | Facility: HOSPITAL | Age: 58
End: 2018-11-23
Payer: COMMERCIAL

## 2018-11-23 ENCOUNTER — TRANSCRIBE ORDERS (OUTPATIENT)
Dept: ADMINISTRATIVE | Facility: HOSPITAL | Age: 58
End: 2018-11-23

## 2018-11-23 DIAGNOSIS — E03.9 ACQUIRED HYPOTHYROIDISM: ICD-10-CM

## 2018-11-23 DIAGNOSIS — Z13.9 SCREENING FOR CONDITION: ICD-10-CM

## 2018-11-23 DIAGNOSIS — Z13.9 SCREENING FOR CONDITION: Primary | ICD-10-CM

## 2018-11-23 DIAGNOSIS — E78.2 MIXED HYPERLIPIDEMIA: ICD-10-CM

## 2018-11-23 DIAGNOSIS — D75.1 POLYCYTHEMIA: ICD-10-CM

## 2018-11-23 DIAGNOSIS — E55.9 VITAMIN D DEFICIENCY: ICD-10-CM

## 2018-11-23 LAB
25(OH)D3 SERPL-MCNC: 39.5 NG/ML (ref 30–100)
BASOPHILS # BLD AUTO: 0.03 THOUSANDS/ΜL (ref 0–0.1)
BASOPHILS NFR BLD AUTO: 1 % (ref 0–1)
CHOLEST SERPL-MCNC: 187 MG/DL (ref 50–200)
EOSINOPHIL # BLD AUTO: 0.16 THOUSAND/ΜL (ref 0–0.61)
EOSINOPHIL NFR BLD AUTO: 3 % (ref 0–6)
ERYTHROCYTE [DISTWIDTH] IN BLOOD BY AUTOMATED COUNT: 12.8 % (ref 11.6–15.1)
HCT VFR BLD AUTO: 47.7 % (ref 34.8–46.1)
HDLC SERPL-MCNC: 61 MG/DL (ref 40–60)
HGB BLD-MCNC: 15.9 G/DL (ref 11.5–15.4)
IMM GRANULOCYTES # BLD AUTO: 0.01 THOUSAND/UL (ref 0–0.2)
IMM GRANULOCYTES NFR BLD AUTO: 0 % (ref 0–2)
LDLC SERPL CALC-MCNC: 103 MG/DL (ref 0–100)
LYMPHOCYTES # BLD AUTO: 2.35 THOUSANDS/ΜL (ref 0.6–4.47)
LYMPHOCYTES NFR BLD AUTO: 41 % (ref 14–44)
MCH RBC QN AUTO: 29.5 PG (ref 26.8–34.3)
MCHC RBC AUTO-ENTMCNC: 33.3 G/DL (ref 31.4–37.4)
MCV RBC AUTO: 89 FL (ref 82–98)
MONOCYTES # BLD AUTO: 0.3 THOUSAND/ΜL (ref 0.17–1.22)
MONOCYTES NFR BLD AUTO: 5 % (ref 4–12)
NEUTROPHILS # BLD AUTO: 2.85 THOUSANDS/ΜL (ref 1.85–7.62)
NEUTS SEG NFR BLD AUTO: 50 % (ref 43–75)
NONHDLC SERPL-MCNC: 126 MG/DL
NRBC BLD AUTO-RTO: 0 /100 WBCS
PLATELET # BLD AUTO: 175 THOUSANDS/UL (ref 149–390)
PMV BLD AUTO: 8.9 FL (ref 8.9–12.7)
RBC # BLD AUTO: 5.39 MILLION/UL (ref 3.81–5.12)
T4 FREE SERPL-MCNC: 1.26 NG/DL (ref 0.76–1.46)
TRIGL SERPL-MCNC: 116 MG/DL
TSH SERPL DL<=0.05 MIU/L-ACNC: 4.16 UIU/ML (ref 0.36–3.74)
WBC # BLD AUTO: 5.7 THOUSAND/UL (ref 4.31–10.16)

## 2018-11-23 PROCEDURE — 85025 COMPLETE CBC W/AUTO DIFF WBC: CPT

## 2018-11-23 PROCEDURE — 82306 VITAMIN D 25 HYDROXY: CPT

## 2018-11-23 PROCEDURE — 84439 ASSAY OF FREE THYROXINE: CPT

## 2018-11-23 PROCEDURE — 80061 LIPID PANEL: CPT

## 2018-11-23 PROCEDURE — 84443 ASSAY THYROID STIM HORMONE: CPT

## 2018-11-23 PROCEDURE — 36415 COLL VENOUS BLD VENIPUNCTURE: CPT

## 2018-11-29 ENCOUNTER — OFFICE VISIT (OUTPATIENT)
Dept: FAMILY MEDICINE CLINIC | Facility: CLINIC | Age: 58
End: 2018-11-29
Payer: COMMERCIAL

## 2018-11-29 VITALS
SYSTOLIC BLOOD PRESSURE: 170 MMHG | BODY MASS INDEX: 48.82 KG/M2 | HEIGHT: 65 IN | OXYGEN SATURATION: 97 % | WEIGHT: 293 LBS | TEMPERATURE: 99.1 F | DIASTOLIC BLOOD PRESSURE: 106 MMHG | HEART RATE: 92 BPM

## 2018-11-29 DIAGNOSIS — F32.9 REACTIVE DEPRESSION: ICD-10-CM

## 2018-11-29 DIAGNOSIS — E55.9 VITAMIN D DEFICIENCY: ICD-10-CM

## 2018-11-29 DIAGNOSIS — E03.9 ACQUIRED HYPOTHYROIDISM: ICD-10-CM

## 2018-11-29 DIAGNOSIS — E78.2 MIXED HYPERLIPIDEMIA: ICD-10-CM

## 2018-11-29 DIAGNOSIS — I10 ELEVATED BLOOD PRESSURE READING IN OFFICE WITH WHITE COAT SYNDROME, WITH DIAGNOSIS OF HYPERTENSION: ICD-10-CM

## 2018-11-29 DIAGNOSIS — D75.1 POLYCYTHEMIA: Primary | ICD-10-CM

## 2018-11-29 DIAGNOSIS — Z12.11 SCREENING FOR COLON CANCER: ICD-10-CM

## 2018-11-29 DIAGNOSIS — Z23 NEED FOR INFLUENZA VACCINATION: ICD-10-CM

## 2018-11-29 PROCEDURE — 1036F TOBACCO NON-USER: CPT | Performed by: NURSE PRACTITIONER

## 2018-11-29 PROCEDURE — 90471 IMMUNIZATION ADMIN: CPT

## 2018-11-29 PROCEDURE — 90682 RIV4 VACC RECOMBINANT DNA IM: CPT

## 2018-11-29 PROCEDURE — 99214 OFFICE O/P EST MOD 30 MIN: CPT | Performed by: NURSE PRACTITIONER

## 2018-11-29 RX ORDER — LEVOTHYROXINE SODIUM 0.07 MG/1
75 TABLET ORAL DAILY
Qty: 30 TABLET | Refills: 1 | Status: SHIPPED | OUTPATIENT
Start: 2018-11-29 | End: 2018-12-27 | Stop reason: SDUPTHER

## 2018-11-29 RX ORDER — ASPIRIN 81 MG/1
81 TABLET ORAL
COMMUNITY

## 2018-11-29 RX ORDER — PHENOL 1.4 %
AEROSOL, SPRAY (ML) MUCOUS MEMBRANE
COMMUNITY

## 2018-11-29 RX ORDER — CITALOPRAM 20 MG/1
20 TABLET ORAL DAILY
Qty: 30 TABLET | Refills: 1 | Status: SHIPPED | OUTPATIENT
Start: 2018-11-29 | End: 2018-12-27 | Stop reason: SDUPTHER

## 2018-11-29 NOTE — PROGRESS NOTES
Assessment/Plan:    No problem-specific Assessment & Plan notes found for this encounter  Diagnoses and all orders for this visit:    Polycythemia  Comments:  H&H stable,  15 9/ 47 7    Elevated blood pressure reading in office with white coat syndrome, with diagnosis of hypertension  Comments:  bp 170/100 but pt extremely upset throughout interview today- will start antidepressant to assess if aids in reduction in bp     Reactive depression  Comments:  Rx for Celexa provided, pt enc to seek counseling with her local [facility] group  Orders:  -     citalopram (CeleXA) 20 mg tablet; Take 1 tablet (20 mg total) by mouth daily    Mixed hyperlipidemia  Comments:  Overall lipid profile much improved with use of Crestor    Vitamin D deficiency  Comments:  Vitamin D 39 5 pt continues on Vitamin D 50,000 IU weekly    Screening for colon cancer  Comments:  Fit test ordered  Orders:  -     Occult Blood, Fecal Immunochemical; Future    Acquired hypothyroidism  Comments:  TSH 4 17 will increase Levothyroxine to 75mcg and repeat labs in 2 mos  Orders:  -     levothyroxine 75 mcg tablet; Take 1 tablet (75 mcg total) by mouth daily  -     TSH, 3rd generation with Free T4 reflex; Future    Need for influenza vaccination  Comments:  Provided today  Orders:  -     influenza vaccine, [id number], quadrivalent, recombinant, PF, 0 5 mL, for patients 18 yr+ (FLUBLOK)    Other orders  -     aspirin (ECOTRIN LOW STRENGTH) 81 mg EC tablet; Take 81 mg by mouth  -     Multiple Vitamins-Minerals (MULTIVITAMIN ADULTS 50+) TABS; Take by mouth          Subjective:      Patient ID: Ken Frazier is a 66 y o  female  66 yr old female returns for bp recheck  Pt bp 170/96 today but she is extremely upset during the visit, crying, expressing the stress she is going through with her 2 daughters and drug abuse while raising her granddaughter  Pt did gain weight since her last visit which also made her cry incessantly   We discussed need for pt to get out in the community with friends and join a [facility] group to discuss coping strategies that others are facing when dealing with addiction  We also discussed pt starting an antidepressant to which she was agreeable  We did review her labs which have improved since her last visit, lipid level lower with use of Crestor  TSH 4 17, I will increase Levothyroxine to 75mcg and recheck level in 2 mos  Pt agreeable to return in 1 mos to discuss effect of Celexa and reassess Bp        The following portions of the patient's history were reviewed and updated as appropriate:   She  has no past medical history on file  She   Patient Active Problem List    Diagnosis Date Noted    Elevated blood pressure reading in office with white coat syndrome, with diagnosis of hypertension 05/24/2018    Mixed hyperlipidemia 05/24/2018    Polycythemia 05/24/2018    S/p partial hysterectomy with remaining cervical stump 05/03/2018    Vitamin D deficiency 05/02/2018     She  has a past surgical history that includes Total abdominal hysterectomy; [place] tooth extraction; and Tubal ligation  Her family history includes Hypertension in her father  She  reports that she has never smoked  She has never used smokeless tobacco  She reports that she does not drink alcohol or use drugs    Current Outpatient Prescriptions   Medication Sig Dispense Refill    aspirin (ECOTRIN LOW STRENGTH) 81 mg EC tablet Take 81 mg by mouth      ergocalciferol (VITAMIN D2) 50,000 units Take 1 capsule (50,000 Units total) by mouth once a week 12 capsule 0    levothyroxine 75 mcg tablet Take 1 tablet (75 mcg total) by mouth daily 30 tablet 1    Multiple Vitamins-Minerals (MULTIVITAMIN ADULTS 50+) TABS Take by mouth      rosuvastatin (CRESTOR) 10 MG tablet Take 1 tablet (10 mg total) by mouth daily 30 tablet 5    citalopram (CeleXA) 20 mg tablet Take 1 tablet (20 mg total) by mouth daily 30 tablet 1     No current facility-administered medications for this visit  Current Outpatient Prescriptions on File Prior to Visit   Medication Sig    ergocalciferol (VITAMIN D2) 50,000 units Take 1 capsule (50,000 Units total) by mouth once a week    rosuvastatin (CRESTOR) 10 MG tablet Take 1 tablet (10 mg total) by mouth daily    [DISCONTINUED] levothyroxine 25 mcg tablet Take 1 tablet (25 mcg total) by mouth daily    [DISCONTINUED] LOW-DOSE ASPIRIN PO Take by mouth    [DISCONTINUED] Multiple Vitamins-Minerals (MULTIVITAMIN ADULT PO) Take by mouth     No current facility-administered medications on file prior to visit  She has No Known Allergies       Review of Systems   Constitutional: Positive for unexpected weight change  Negative for fatigue  HENT: Negative  Negative for congestion, postnasal drip, rhinorrhea, sinus pain, sore throat and trouble swallowing  Eyes: Negative  Negative for pain and visual disturbance  Respiratory: Negative  Negative for cough, shortness of breath and wheezing  Cardiovascular: Negative  Negative for chest pain and palpitations  Gastrointestinal: Negative  Negative for constipation, diarrhea, nausea and vomiting  Endocrine: Negative  Negative for polydipsia, polyphagia and polyuria  Genitourinary: Negative  Negative for difficulty urinating, flank pain, frequency and pelvic pain  Musculoskeletal: Negative  Negative for arthralgias, back pain, joint swelling and myalgias  Skin: Negative  Negative for color change and rash  Allergic/Immunologic: Negative  Neurological: Negative  Negative for dizziness, syncope, weakness, light-headedness, numbness and headaches  Hematological: Negative  Negative for adenopathy  Does not bruise/bleed easily  Psychiatric/Behavioral: Positive for sleep disturbance  Negative for behavioral problems  The patient is nervous/anxious           Pt reports crying daily         Objective:      BP (!) 170/106   Pulse 92   Temp 99 1 °F (37 3 °C) (Tympanic) Ht 5' 5" (1 651 m)   Wt (!) 136 kg (300 lb 6 4 oz)   SpO2 97%   BMI 49 99 kg/m²          Physical Exam   Constitutional: She is oriented to person, place, and time  She appears well-developed and well-nourished  HENT:   Head: Normocephalic  Eyes: Pupils are equal, round, and reactive to light  Neck: Normal range of motion  Cardiovascular: Normal rate and regular rhythm  Pulmonary/Chest: Effort normal and breath sounds normal    Abdominal: Soft  Bowel sounds are normal    Musculoskeletal: Normal range of motion  Neurological: She is alert and oriented to person, place, and time  Skin: Skin is warm and dry  Psychiatric: Her behavior is normal  Judgment and thought content normal  She exhibits a depressed mood  Pt crying incessantly throughout interview today   Nursing note and vitals reviewed

## 2018-12-24 ENCOUNTER — APPOINTMENT (OUTPATIENT)
Dept: LAB | Facility: HOSPITAL | Age: 58
End: 2018-12-24
Payer: COMMERCIAL

## 2018-12-24 DIAGNOSIS — E03.9 ACQUIRED HYPOTHYROIDISM: ICD-10-CM

## 2018-12-24 LAB — TSH SERPL DL<=0.05 MIU/L-ACNC: 1.31 UIU/ML (ref 0.36–3.74)

## 2018-12-24 PROCEDURE — 84443 ASSAY THYROID STIM HORMONE: CPT

## 2018-12-24 PROCEDURE — 36415 COLL VENOUS BLD VENIPUNCTURE: CPT

## 2018-12-27 ENCOUNTER — OFFICE VISIT (OUTPATIENT)
Dept: FAMILY MEDICINE CLINIC | Facility: CLINIC | Age: 58
End: 2018-12-27
Payer: COMMERCIAL

## 2018-12-27 VITALS
BODY MASS INDEX: 48.82 KG/M2 | DIASTOLIC BLOOD PRESSURE: 80 MMHG | WEIGHT: 293 LBS | SYSTOLIC BLOOD PRESSURE: 130 MMHG | HEART RATE: 73 BPM | HEIGHT: 65 IN | OXYGEN SATURATION: 97 %

## 2018-12-27 DIAGNOSIS — E03.9 ACQUIRED HYPOTHYROIDISM: Primary | ICD-10-CM

## 2018-12-27 DIAGNOSIS — E66.01 CLASS 3 SEVERE OBESITY DUE TO EXCESS CALORIES WITHOUT SERIOUS COMORBIDITY WITH BODY MASS INDEX (BMI) OF 45.0 TO 49.9 IN ADULT (HCC): ICD-10-CM

## 2018-12-27 DIAGNOSIS — E55.9 VITAMIN D DEFICIENCY: ICD-10-CM

## 2018-12-27 DIAGNOSIS — D75.1 ERYTHROCYTOSIS: ICD-10-CM

## 2018-12-27 DIAGNOSIS — F32.9 REACTIVE DEPRESSION: ICD-10-CM

## 2018-12-27 PROBLEM — E03.8 OTHER SPECIFIED HYPOTHYROIDISM: Status: ACTIVE | Noted: 2018-12-27

## 2018-12-27 PROCEDURE — 1036F TOBACCO NON-USER: CPT | Performed by: NURSE PRACTITIONER

## 2018-12-27 PROCEDURE — 99214 OFFICE O/P EST MOD 30 MIN: CPT | Performed by: NURSE PRACTITIONER

## 2018-12-27 RX ORDER — ERGOCALCIFEROL 1.25 MG/1
50000 CAPSULE ORAL WEEKLY
Qty: 12 CAPSULE | Refills: 5 | Status: SHIPPED | OUTPATIENT
Start: 2018-12-27 | End: 2019-02-19 | Stop reason: SDUPTHER

## 2018-12-27 RX ORDER — LEVOTHYROXINE SODIUM 0.07 MG/1
75 TABLET ORAL DAILY
Qty: 90 TABLET | Refills: 3 | Status: SHIPPED | OUTPATIENT
Start: 2018-12-27 | End: 2019-12-15 | Stop reason: SDUPTHER

## 2018-12-27 RX ORDER — CITALOPRAM 20 MG/1
40 TABLET ORAL DAILY
Qty: 90 TABLET | Refills: 3 | Status: SHIPPED | OUTPATIENT
Start: 2018-12-27 | End: 2019-05-21 | Stop reason: SDUPTHER

## 2018-12-27 NOTE — PROGRESS NOTES
Assessment/Plan:    No problem-specific Assessment & Plan notes found for this encounter  Diagnoses and all orders for this visit:    Acquired hypothyroidism  Comments:  TSH 1 13 Levothyroxine to 75mcg   Orders:  -     levothyroxine 75 mcg tablet; Take 1 tablet (75 mcg total) by mouth daily  -     TSH, 3rd generation with Free T4 reflex; Future    Reactive depression  Comments:  Rx for Celexa provided, pt enc to seek counseling with her local [facility] group  Orders:  -     citalopram (CeleXA) 20 mg tablet; Take 2 tablets (40 mg total) by mouth daily    Class 3 severe obesity due to excess calories without serious comorbidity with body mass index (BMI) of 45 0 to 49 9 in [facility])  Comments:  Pt has lost 5 lbs since Nov 2018 now 296lbs    Vitamin D deficiency  Comments:  Vitamin D def 11 7 Rx for Vitamin D 50,000 weekly provided  Orders:  -     ergocalciferol (VITAMIN D2) 50,000 units; Take 1 capsule (50,000 Units total) by mouth once a week    Erythrocytosis  Comments:  Pt did see Dr Terra Campos of hematology, labs he checked JacX were within normal limits, low likelihood of blood disorder  Orders:  -     CBC and differential; Future          Subjective:      Patient ID: Magdalene Erickson is a 66 y o  female  66 yr old female here for f/u medication management regarding hypothyroidism  TSH 1 31 today will continue present dosage of Levothyroxine  Pt reports she still feels little relief from the Celexa at 20mg I will increase the dosage to 40mg today        The following portions of the patient's history were reviewed and updated as appropriate:   She  has a past medical history of Depression (2013); Heart murmur; and Obesity (1979)    She   Patient Active Problem List    Diagnosis Date Noted    Other specified hypothyroidism 12/27/2018    Elevated blood pressure reading in office with white coat syndrome, with diagnosis of hypertension 05/24/2018    Mixed hyperlipidemia 05/24/2018    Polycythemia 05/24/2018    S/p partial hysterectomy with remaining cervical stump 05/03/2018    Vitamin D deficiency 05/02/2018    Depression 01/01/2013    Obesity 01/01/1979     She  has a past surgical history that includes Total abdominal hysterectomy; [place] tooth extraction; and Tubal ligation  Her family history includes Hypertension in her father  She  reports that she has never smoked  She has never used smokeless tobacco  She reports that she does not drink alcohol or use drugs  Current Outpatient Prescriptions   Medication Sig Dispense Refill    aspirin (ECOTRIN LOW STRENGTH) 81 mg EC tablet Take 81 mg by mouth      citalopram (CeleXA) 20 mg tablet Take 2 tablets (40 mg total) by mouth daily 90 tablet 3    ergocalciferol (VITAMIN D2) 50,000 units Take 1 capsule (50,000 Units total) by mouth once a week 12 capsule 5    levothyroxine 75 mcg tablet Take 1 tablet (75 mcg total) by mouth daily 90 tablet 3    Multiple Vitamins-Minerals (MULTIVITAMIN ADULTS 50+) TABS Take by mouth      rosuvastatin (CRESTOR) 10 MG tablet Take 1 tablet (10 mg total) by mouth daily 30 tablet 5     No current facility-administered medications for this visit  Current Outpatient Prescriptions on File Prior to Visit   Medication Sig    aspirin (ECOTRIN LOW STRENGTH) 81 mg EC tablet Take 81 mg by mouth    Multiple Vitamins-Minerals (MULTIVITAMIN ADULTS 50+) TABS Take by mouth    rosuvastatin (CRESTOR) 10 MG tablet Take 1 tablet (10 mg total) by mouth daily    [DISCONTINUED] citalopram (CeleXA) 20 mg tablet Take 1 tablet (20 mg total) by mouth daily    [DISCONTINUED] ergocalciferol (VITAMIN D2) 50,000 units Take 1 capsule (50,000 Units total) by mouth once a week    [DISCONTINUED] levothyroxine 75 mcg tablet Take 1 tablet (75 mcg total) by mouth daily     No current facility-administered medications on file prior to visit  She has No Known Allergies       Review of Systems   Constitutional: Negative for fatigue     HENT: Negative for congestion, postnasal drip, rhinorrhea, sinus pain, sore throat and trouble swallowing  Eyes: Negative for pain and visual disturbance  Respiratory: Negative for cough, shortness of breath and wheezing  Cardiovascular: Negative for chest pain and palpitations  Gastrointestinal: Negative for constipation, diarrhea, nausea and vomiting  Endocrine: Negative for polydipsia, polyphagia and polyuria  Genitourinary: Negative for difficulty urinating, flank pain, frequency and pelvic pain  Musculoskeletal: Negative for arthralgias, back pain, joint swelling and myalgias  Skin: Negative  Negative for color change and rash  Neurological: Negative for dizziness, syncope, weakness, light-headedness, numbness and headaches  Hematological: Negative for adenopathy  Does not bruise/bleed easily  Psychiatric/Behavioral: Negative for behavioral problems and sleep disturbance  The patient is not nervous/anxious  Depression         Objective:      /80 (BP Location: Right arm, Patient Position: Sitting, Cuff Size: Large)   Pulse 73   Ht 5' 5" (1 651 m)   Wt 134 kg (296 lb)   SpO2 97%   BMI 49 26 kg/m²          Physical Exam   Constitutional: She is oriented to person, place, and time  She appears well-developed and well-nourished  HENT:   Head: Normocephalic  Eyes: Pupils are equal, round, and reactive to light  Neck: Normal range of motion  Cardiovascular: Normal rate and regular rhythm  Pulmonary/Chest: Effort normal and breath sounds normal    Abdominal: Soft  Bowel sounds are normal    Musculoskeletal: Normal range of motion  Neurological: She is alert and oriented to person, place, and time  Skin: Skin is warm and dry  Psychiatric: Her behavior is normal  Judgment and thought content normal  She exhibits a depressed mood  Nursing note and vitals reviewed

## 2019-01-25 ENCOUNTER — CLINICAL SUPPORT (OUTPATIENT)
Dept: FAMILY MEDICINE CLINIC | Facility: CLINIC | Age: 59
End: 2019-01-25
Payer: COMMERCIAL

## 2019-01-25 DIAGNOSIS — Z23 NEED FOR VACCINATION: Primary | ICD-10-CM

## 2019-01-25 PROCEDURE — 86580 TB INTRADERMAL TEST: CPT

## 2019-02-19 DIAGNOSIS — E55.9 VITAMIN D DEFICIENCY: ICD-10-CM

## 2019-02-19 RX ORDER — ERGOCALCIFEROL 1.25 MG/1
50000 CAPSULE ORAL WEEKLY
Qty: 12 CAPSULE | Refills: 5 | Status: SHIPPED | OUTPATIENT
Start: 2019-02-19 | End: 2020-03-02

## 2019-05-17 DIAGNOSIS — E78.2 MIXED HYPERLIPIDEMIA: ICD-10-CM

## 2019-05-17 RX ORDER — ROSUVASTATIN CALCIUM 10 MG/1
TABLET, COATED ORAL
Qty: 30 TABLET | Refills: 5 | Status: SHIPPED | OUTPATIENT
Start: 2019-05-17 | End: 2019-05-21 | Stop reason: SDUPTHER

## 2019-05-21 DIAGNOSIS — E78.2 MIXED HYPERLIPIDEMIA: ICD-10-CM

## 2019-05-21 DIAGNOSIS — F32.9 REACTIVE DEPRESSION: ICD-10-CM

## 2019-05-21 RX ORDER — ROSUVASTATIN CALCIUM 10 MG/1
10 TABLET, COATED ORAL DAILY
Qty: 30 TABLET | Refills: 5 | Status: SHIPPED | OUTPATIENT
Start: 2019-05-21 | End: 2019-07-23 | Stop reason: SINTOL

## 2019-05-21 RX ORDER — CITALOPRAM 40 MG/1
40 TABLET ORAL DAILY
Qty: 30 TABLET | Refills: 5 | Status: SHIPPED | OUTPATIENT
Start: 2019-05-21 | End: 2019-12-11 | Stop reason: SDUPTHER

## 2019-06-17 ENCOUNTER — OFFICE VISIT (OUTPATIENT)
Dept: FAMILY MEDICINE CLINIC | Facility: CLINIC | Age: 59
End: 2019-06-17
Payer: COMMERCIAL

## 2019-06-17 VITALS
SYSTOLIC BLOOD PRESSURE: 144 MMHG | WEIGHT: 293 LBS | DIASTOLIC BLOOD PRESSURE: 88 MMHG | TEMPERATURE: 100.6 F | BODY MASS INDEX: 48.82 KG/M2 | OXYGEN SATURATION: 98 % | HEART RATE: 90 BPM | HEIGHT: 65 IN

## 2019-06-17 DIAGNOSIS — F32.0 CURRENT MILD EPISODE OF MAJOR DEPRESSIVE DISORDER WITHOUT PRIOR EPISODE (HCC): ICD-10-CM

## 2019-06-17 DIAGNOSIS — J01.00 ACUTE NON-RECURRENT MAXILLARY SINUSITIS: Primary | ICD-10-CM

## 2019-06-17 DIAGNOSIS — E66.01 CLASS 3 SEVERE OBESITY DUE TO EXCESS CALORIES WITHOUT SERIOUS COMORBIDITY WITH BODY MASS INDEX (BMI) OF 50.0 TO 59.9 IN ADULT (HCC): ICD-10-CM

## 2019-06-17 PROCEDURE — 1036F TOBACCO NON-USER: CPT | Performed by: FAMILY MEDICINE

## 2019-06-17 PROCEDURE — 99214 OFFICE O/P EST MOD 30 MIN: CPT | Performed by: FAMILY MEDICINE

## 2019-06-17 PROCEDURE — 3008F BODY MASS INDEX DOCD: CPT | Performed by: FAMILY MEDICINE

## 2019-06-17 RX ORDER — CHLORAL HYDRATE 500 MG
1000 CAPSULE ORAL DAILY
COMMUNITY

## 2019-06-17 RX ORDER — AZITHROMYCIN 250 MG/1
TABLET, FILM COATED ORAL
Qty: 6 TABLET | Refills: 0 | Status: SHIPPED | OUTPATIENT
Start: 2019-06-17 | End: 2019-06-22

## 2019-07-19 ENCOUNTER — APPOINTMENT (OUTPATIENT)
Dept: LAB | Facility: HOSPITAL | Age: 59
End: 2019-07-19
Payer: COMMERCIAL

## 2019-07-19 DIAGNOSIS — D75.1 ERYTHROCYTOSIS: ICD-10-CM

## 2019-07-19 DIAGNOSIS — E03.9 ACQUIRED HYPOTHYROIDISM: ICD-10-CM

## 2019-07-19 LAB
BASOPHILS # BLD AUTO: 0.04 THOUSANDS/ΜL (ref 0–0.1)
BASOPHILS NFR BLD AUTO: 1 % (ref 0–1)
EOSINOPHIL # BLD AUTO: 0.14 THOUSAND/ΜL (ref 0–0.61)
EOSINOPHIL NFR BLD AUTO: 2 % (ref 0–6)
ERYTHROCYTE [DISTWIDTH] IN BLOOD BY AUTOMATED COUNT: 13.3 % (ref 11.6–15.1)
HCT VFR BLD AUTO: 49.4 % (ref 34.8–46.1)
HGB BLD-MCNC: 15.9 G/DL (ref 11.5–15.4)
IMM GRANULOCYTES # BLD AUTO: 0.02 THOUSAND/UL (ref 0–0.2)
IMM GRANULOCYTES NFR BLD AUTO: 0 % (ref 0–2)
LYMPHOCYTES # BLD AUTO: 2 THOUSANDS/ΜL (ref 0.6–4.47)
LYMPHOCYTES NFR BLD AUTO: 30 % (ref 14–44)
MCH RBC QN AUTO: 29.8 PG (ref 26.8–34.3)
MCHC RBC AUTO-ENTMCNC: 32.2 G/DL (ref 31.4–37.4)
MCV RBC AUTO: 93 FL (ref 82–98)
MONOCYTES # BLD AUTO: 0.3 THOUSAND/ΜL (ref 0.17–1.22)
MONOCYTES NFR BLD AUTO: 5 % (ref 4–12)
NEUTROPHILS # BLD AUTO: 4.07 THOUSANDS/ΜL (ref 1.85–7.62)
NEUTS SEG NFR BLD AUTO: 62 % (ref 43–75)
NRBC BLD AUTO-RTO: 0 /100 WBCS
PLATELET # BLD AUTO: 176 THOUSANDS/UL (ref 149–390)
PMV BLD AUTO: 10.1 FL (ref 8.9–12.7)
RBC # BLD AUTO: 5.33 MILLION/UL (ref 3.81–5.12)
TSH SERPL DL<=0.05 MIU/L-ACNC: 0.46 UIU/ML (ref 0.36–3.74)
WBC # BLD AUTO: 6.57 THOUSAND/UL (ref 4.31–10.16)

## 2019-07-19 PROCEDURE — 84443 ASSAY THYROID STIM HORMONE: CPT

## 2019-07-19 PROCEDURE — 36415 COLL VENOUS BLD VENIPUNCTURE: CPT

## 2019-07-19 PROCEDURE — 85025 COMPLETE CBC W/AUTO DIFF WBC: CPT

## 2019-07-23 ENCOUNTER — OFFICE VISIT (OUTPATIENT)
Dept: FAMILY MEDICINE CLINIC | Facility: CLINIC | Age: 59
End: 2019-07-23
Payer: COMMERCIAL

## 2019-07-23 VITALS
BODY MASS INDEX: 48.82 KG/M2 | WEIGHT: 293 LBS | HEART RATE: 74 BPM | HEIGHT: 65 IN | DIASTOLIC BLOOD PRESSURE: 82 MMHG | SYSTOLIC BLOOD PRESSURE: 128 MMHG | OXYGEN SATURATION: 98 %

## 2019-07-23 DIAGNOSIS — F32.0 CURRENT MILD EPISODE OF MAJOR DEPRESSIVE DISORDER WITHOUT PRIOR EPISODE (HCC): Primary | ICD-10-CM

## 2019-07-23 DIAGNOSIS — E03.8 OTHER SPECIFIED HYPOTHYROIDISM: ICD-10-CM

## 2019-07-23 DIAGNOSIS — D75.1 POLYCYTHEMIA: ICD-10-CM

## 2019-07-23 DIAGNOSIS — Z11.59 ENCOUNTER FOR HEPATITIS C SCREENING TEST FOR LOW RISK PATIENT: ICD-10-CM

## 2019-07-23 DIAGNOSIS — M25.50 ARTHRALGIA, UNSPECIFIED JOINT: ICD-10-CM

## 2019-07-23 DIAGNOSIS — Z12.11 SCREENING FOR COLON CANCER: ICD-10-CM

## 2019-07-23 DIAGNOSIS — E66.01 CLASS 3 SEVERE OBESITY DUE TO EXCESS CALORIES WITHOUT SERIOUS COMORBIDITY WITH BODY MASS INDEX (BMI) OF 45.0 TO 49.9 IN ADULT (HCC): ICD-10-CM

## 2019-07-23 PROCEDURE — 1036F TOBACCO NON-USER: CPT | Performed by: NURSE PRACTITIONER

## 2019-07-23 PROCEDURE — 99214 OFFICE O/P EST MOD 30 MIN: CPT | Performed by: NURSE PRACTITIONER

## 2019-07-23 PROCEDURE — 3008F BODY MASS INDEX DOCD: CPT | Performed by: NURSE PRACTITIONER

## 2019-07-23 NOTE — PROGRESS NOTES
Assessment/Plan:    No problem-specific Assessment & Plan notes found for this encounter  Diagnoses and all orders for this visit:    Current mild episode of major depressive disorder without prior episode ([address] )  Comments:  Pt is following with [facility] weekly is attending counseling and using Celexa and Wellbutrin    Screening for colon cancer  Comments:  Diane provided  Orders:  -     Cologuard; Future    Class 3 severe obesity due to excess calories without serious comorbidity with body mass index (BMI) of 45 0 to 49 9 in adult [facility])  Comments:  Pt counciled on weight loss, is trying to increase her activity and watch carbohydrates and fats    Polycythemia  Comments:  CBC stable at this time    Other specified hypothyroidism  Comments:  TSH stable cont Levothyroxine    Arthralgia, unspecified joint  Comments:  Labwork ordered will call with results  Orders:  -     NEY Scr, IFA W/Refl Titer/Pattern/Lupus Pnl 3; Future  -     Lyme Antibody Profile with reflex to WB; Future  -     RF Screen w/ Reflex to Titer; Future  -     Sedimentation rate, automated; Future  -     Sjogren's Antibodies; Future  -     Anti-DNAse B antibody; Future  -     C-reactive protein; Future    Encounter for hepatitis C screening test for low risk patient  Comments:  Labwork ordered  Orders:  -     Hepatitis C antibody; Future          Subjective:      Patient ID: Ceferino Espinoza is a 64 y o  female  64 yr old female here for 6 mos f/u , lab review  Pt labs stable, TSH  WNL, Levothyroxine will remain 75mcg  Pt cont to follow with [facility] and has now added Wellbutrin and Celexa with much relief per pt  Pt is also receiving counseling weekly from [facility]   Pt counciled on her weight  Pt expresses concern about possibility of autoimmune disease as her daughter was recently dx with suspect Lupus  Pt although very obese, c/o multiple area of joint pain that she has always attributed to her weight  Pt c/o bilateral hip , shoulder and leg pain  The following portions of the patient's history were reviewed and updated as appropriate: She  has a past medical history of Depression (2013), Heart murmur, and Obesity (1979)  She   Patient Active Problem List    Diagnosis Date Noted    Joint pain 07/23/2019    Other specified hypothyroidism 12/27/2018    Elevated blood pressure reading in office with white coat syndrome, with diagnosis of hypertension 05/24/2018    Mixed hyperlipidemia 05/24/2018    Polycythemia 05/24/2018    S/p partial hysterectomy with remaining cervical stump 05/03/2018    Vitamin D deficiency 05/02/2018    Depression 01/01/2013    Obesity 01/01/1979     She  has a past surgical history that includes Total abdominal hysterectomy; Switzer tooth extraction; and Tubal ligation  Her family history includes Hypertension in her father  She  reports that she has never smoked  She has never used smokeless tobacco  She reports that she does not drink alcohol or use drugs  Current Outpatient Medications   Medication Sig Dispense Refill    aspirin (ECOTRIN LOW STRENGTH) 81 mg EC tablet Take 81 mg by mouth      citalopram (CeleXA) 40 mg tablet Take 1 tablet (40 mg total) by mouth daily 30 tablet 5    ergocalciferol (VITAMIN D2) 50,000 units Take 1 capsule (50,000 Units total) by mouth once a week 12 capsule 5    levothyroxine 75 mcg tablet Take 1 tablet (75 mcg total) by mouth daily 90 tablet 3    Multiple Vitamins-Minerals (MULTIVITAMIN ADULTS 50+) TABS Take by mouth      Omega-3 Fatty Acids (FISH OIL) 1,000 mg Take 1,000 mg by mouth daily       No current facility-administered medications for this visit        Current Outpatient Medications on File Prior to Visit   Medication Sig    aspirin (ECOTRIN LOW STRENGTH) 81 mg EC tablet Take 81 mg by mouth    citalopram (CeleXA) 40 mg tablet Take 1 tablet (40 mg total) by mouth daily    ergocalciferol (VITAMIN D2) 50,000 units Take 1 capsule (50,000 Units total) by mouth once a week  levothyroxine 75 mcg tablet Take 1 tablet (75 mcg total) by mouth daily    Multiple Vitamins-Minerals (MULTIVITAMIN ADULTS 50+) TABS Take by mouth    Omega-3 Fatty Acids (FISH OIL) 1,000 mg Take 1,000 mg by mouth daily    [DISCONTINUED] rosuvastatin (CRESTOR) 10 MG tablet Take 1 tablet (10 mg total) by mouth daily (Patient not taking: Reported on 7/23/2019)     No current facility-administered medications on file prior to visit  She is allergic to crestor [rosuvastatin]       Review of Systems   Constitutional: Negative for fatigue  HENT: Negative for congestion, postnasal drip, rhinorrhea, sinus pain, sore throat and trouble swallowing  Eyes: Negative for pain and visual disturbance  Respiratory: Negative for cough, shortness of breath and wheezing  Cardiovascular: Negative for chest pain and palpitations  Gastrointestinal: Negative for constipation, diarrhea, nausea and vomiting  Endocrine: Negative for polydipsia, polyphagia and polyuria  Genitourinary: Negative for difficulty urinating, flank pain, frequency and pelvic pain  Musculoskeletal: Positive for arthralgias, joint swelling and neck pain  Negative for back pain and myalgias  Skin: Negative  Negative for color change and rash  Neurological: Negative for dizziness, syncope, weakness, light-headedness, numbness and headaches  Hematological: Negative for adenopathy  Does not bruise/bleed easily  Psychiatric/Behavioral: Negative for behavioral problems and sleep disturbance  The patient is not nervous/anxious  Depression         Objective:      /82 (BP Location: Left arm, Patient Position: Sitting, Cuff Size: Adult)   Pulse 74   Ht 5' 5" (1 651 m)   Wt (!) 139 kg (306 lb 9 6 oz)   SpO2 98%   BMI 51 02 kg/m²          Physical Exam   Constitutional: She is oriented to person, place, and time  She appears well-developed and well-nourished  HENT:   Head: Normocephalic     Eyes: Pupils are equal, round, and reactive to light  Neck: Normal range of motion  Cardiovascular: Normal rate and regular rhythm  Pulmonary/Chest: Effort normal and breath sounds normal    Abdominal: Soft  Bowel sounds are normal    Musculoskeletal:        Right shoulder: She exhibits decreased range of motion and pain  Left shoulder: She exhibits decreased range of motion and pain  Right hip: She exhibits decreased range of motion and tenderness  Left hip: She exhibits decreased range of motion and tenderness  Neurological: She is alert and oriented to person, place, and time  Skin: Skin is warm and dry  Psychiatric: Her behavior is normal  Judgment and thought content normal  She exhibits a depressed mood  Nursing note and vitals reviewed

## 2019-09-24 ENCOUNTER — OFFICE VISIT (OUTPATIENT)
Dept: FAMILY MEDICINE CLINIC | Facility: CLINIC | Age: 59
End: 2019-09-24
Payer: COMMERCIAL

## 2019-09-24 VITALS
BODY MASS INDEX: 48.82 KG/M2 | HEART RATE: 88 BPM | SYSTOLIC BLOOD PRESSURE: 132 MMHG | TEMPERATURE: 97 F | HEIGHT: 65 IN | WEIGHT: 293 LBS | DIASTOLIC BLOOD PRESSURE: 84 MMHG | OXYGEN SATURATION: 98 %

## 2019-09-24 DIAGNOSIS — J30.2 SEASONAL ALLERGIES: ICD-10-CM

## 2019-09-24 DIAGNOSIS — F32.0 CURRENT MILD EPISODE OF MAJOR DEPRESSIVE DISORDER WITHOUT PRIOR EPISODE (HCC): ICD-10-CM

## 2019-09-24 DIAGNOSIS — E66.01 CLASS 3 SEVERE OBESITY DUE TO EXCESS CALORIES WITHOUT SERIOUS COMORBIDITY WITH BODY MASS INDEX (BMI) OF 45.0 TO 49.9 IN ADULT (HCC): ICD-10-CM

## 2019-09-24 DIAGNOSIS — Z23 NEED FOR INFLUENZA VACCINATION: ICD-10-CM

## 2019-09-24 DIAGNOSIS — R42 VERTIGO: Primary | ICD-10-CM

## 2019-09-24 PROCEDURE — 90471 IMMUNIZATION ADMIN: CPT

## 2019-09-24 PROCEDURE — 90682 RIV4 VACC RECOMBINANT DNA IM: CPT

## 2019-09-24 PROCEDURE — 99213 OFFICE O/P EST LOW 20 MIN: CPT | Performed by: NURSE PRACTITIONER

## 2019-09-24 PROCEDURE — 3008F BODY MASS INDEX DOCD: CPT | Performed by: NURSE PRACTITIONER

## 2019-09-24 RX ORDER — CETIRIZINE HYDROCHLORIDE 10 MG/1
10 TABLET, CHEWABLE ORAL DAILY
Qty: 30 TABLET | Refills: 5 | Status: SHIPPED | OUTPATIENT
Start: 2019-09-24 | End: 2019-09-24 | Stop reason: ALTCHOICE

## 2019-09-24 RX ORDER — MECLIZINE HYDROCHLORIDE 25 MG/1
25 TABLET ORAL 3 TIMES DAILY PRN
Qty: 45 TABLET | Refills: 0 | Status: SHIPPED | OUTPATIENT
Start: 2019-09-24 | End: 2019-10-09

## 2019-09-24 RX ORDER — CETIRIZINE HYDROCHLORIDE 10 MG/1
10 TABLET ORAL DAILY
Qty: 30 TABLET | Refills: 5 | Status: SHIPPED | OUTPATIENT
Start: 2019-09-24 | End: 2020-04-20

## 2019-09-24 NOTE — PROGRESS NOTES
Assessment/Plan:    No problem-specific Assessment & Plan notes found for this encounter  Diagnoses and all orders for this visit:    Vertigo  Comments:  Rx for Meclizine and Zyrtec provided  Orders:  -     meclizine (ANTIVERT) 25 mg tablet; Take 1 tablet (25 mg total) by mouth 3 (three) times a day as needed for dizziness for up to 15 days  -     Discontinue: cetirizine (ZyrTEC) 10 MG chewable tablet; Chew 1 tablet (10 mg total) daily    Class 3 severe obesity due to excess calories without serious comorbidity with body mass index (BMI) of 45 0 to 49 9 in adult [facility])  Comments:  Pt counciled    Current mild episode of major depressive disorder without prior episode ([facility] [address] )  Comments:  Pt currently managed with Celexa    Seasonal allergies  Comments:  Rx for Zyrtec provided          Subjective:      Patient ID: Tino Turpin is a 64 y o  female  Vertigo  Patient presents for evaluation of dizziness  The symptoms started several weeks ago and have been intermittent  The attacks occur infrequently and last all day {time Positions that worsen symptoms: any motion  Previous workup/treatments: none  Associated ear symptoms: left ear   Associated CNS symptoms: none  Recent infections: none  Head trauma: denied  Drug ingestion: none  Noise exposure: no occupational exposure  Family history: none    Pt denies any weakness, did check her bp for 4-5 days and reports it was WNL , no loc or head trauma  Pt positive for positional vertigo         The following portions of the patient's history were reviewed and updated as appropriate: She  has a past medical history of Depression (2013), Heart murmur, and Obesity (1979)    She   Patient Active Problem List    Diagnosis Date Noted    Vertigo 09/24/2019    Joint pain 07/23/2019    Other specified hypothyroidism 12/27/2018    Elevated blood pressure reading in office with white coat syndrome, with diagnosis of hypertension 05/24/2018    Mixed hyperlipidemia 05/24/2018  Polycythemia 05/24/2018    S/p partial hysterectomy with remaining cervical stump 05/03/2018    Vitamin D deficiency 05/02/2018    Depression 01/01/2013    Obesity 01/01/1979     She  has a past surgical history that includes Total abdominal hysterectomy; Conner tooth extraction; and Tubal ligation  Her family history includes Hypertension in her father  She  reports that she has never smoked  She has never used smokeless tobacco  She reports that she does not drink alcohol or use drugs  Current Outpatient Medications   Medication Sig Dispense Refill    aspirin (ECOTRIN LOW STRENGTH) 81 mg EC tablet Take 81 mg by mouth      citalopram (CeleXA) 40 mg tablet Take 1 tablet (40 mg total) by mouth daily 30 tablet 5    ergocalciferol (VITAMIN D2) 50,000 units Take 1 capsule (50,000 Units total) by mouth once a week 12 capsule 5    levothyroxine 75 mcg tablet Take 1 tablet (75 mcg total) by mouth daily 90 tablet 3    Multiple Vitamins-Minerals (MULTIVITAMIN ADULTS 50+) TABS Take by mouth      Omega-3 Fatty Acids (FISH OIL) 1,000 mg Take 1,000 mg by mouth daily      meclizine (ANTIVERT) 25 mg tablet Take 1 tablet (25 mg total) by mouth 3 (three) times a day as needed for dizziness for up to 15 days 45 tablet 0     No current facility-administered medications for this visit  Current Outpatient Medications on File Prior to Visit   Medication Sig    aspirin (ECOTRIN LOW STRENGTH) 81 mg EC tablet Take 81 mg by mouth    citalopram (CeleXA) 40 mg tablet Take 1 tablet (40 mg total) by mouth daily    ergocalciferol (VITAMIN D2) 50,000 units Take 1 capsule (50,000 Units total) by mouth once a week    levothyroxine 75 mcg tablet Take 1 tablet (75 mcg total) by mouth daily    Multiple Vitamins-Minerals (MULTIVITAMIN ADULTS 50+) TABS Take by mouth    Omega-3 Fatty Acids (FISH OIL) 1,000 mg Take 1,000 mg by mouth daily     No current facility-administered medications on file prior to visit        She is allergic to crestor [rosuvastatin]       Review of Systems   Constitutional: Negative  HENT: Negative  Eyes: Negative  Respiratory: Negative  Cardiovascular: Negative  Gastrointestinal: Negative  Endocrine: Negative  Genitourinary: Negative  Musculoskeletal: Negative  Skin: Negative  Allergic/Immunologic: Negative  Neurological: Positive for dizziness and light-headedness  Hematological: Negative  Psychiatric/Behavioral: Negative  BMI Counseling: Body mass index is 52 35 kg/m²  Discussed the patient's BMI with her  The BMI is above normal  Nutrition recommendations include reducing portion sizes, decreasing overall calorie intake, 3-5 servings of fruits/vegetables daily, reducing fast food intake, consuming healthier snacks, decreasing soda and/or juice intake, moderation in carbohydrate intake, increasing intake of lean protein, reducing intake of saturated fat and trans fat and reducing intake of cholesterol  Exercise recommendations include vigorous aerobic physical activity for 75 minutes/week and exercising 3-5 times per week  PHQ-9 Depression Screening    PHQ-9:    Frequency of the following problems over the past two weeks:       Little interest or pleasure in doing things:  0 - not at all  Feeling down, depressed, or hopeless:  1 - several days  Trouble falling or staying asleep, or sleeping too much:  3 - nearly every day  Feeling tired or having little energy:  2 - more than half the days  Poor appetite or overeatin - not at all  Feeling bad about yourself - or that you are a failure or have let yourself or your family down:  0 - not at all  Trouble concentrating on things, such as reading the newspaper or watching television:  2 - more than half the days  Moving or speaking so slowly that other people could have noticed   Or the opposite - being so fidgety or restless that you have been moving around a lot more than usual:  0 - not at all  Thoughts that you would be better off dead, or of hurting yourself in some way:  0 - not at all  PHQ-2 Score:  1  PHQ-9 Score:  8          Objective:      /84   Pulse 88   Temp (!) 97 °F (36 1 °C) (Tympanic)   Ht 5' 5" (1 651 m)   Wt (!) 143 kg (314 lb 9 6 oz)   SpO2 98%   BMI 52 35 kg/m²          Physical Exam   Constitutional: She is oriented to person, place, and time  She appears well-developed and well-nourished  HENT:   Head: Normocephalic  Right Ear: No middle ear effusion  Left Ear: A middle ear effusion is present  Eyes: Pupils are equal, round, and reactive to light  Neck: Normal range of motion  Cardiovascular: Normal rate and regular rhythm  Pulmonary/Chest: Effort normal and breath sounds normal    Abdominal: Soft  Bowel sounds are normal    Musculoskeletal: Normal range of motion  Neurological: She is alert and oriented to person, place, and time  Skin: Skin is warm and dry  Psychiatric: She has a normal mood and affect  Her behavior is normal  Judgment and thought content normal    Nursing note and vitals reviewed

## 2019-12-11 DIAGNOSIS — F32.9 REACTIVE DEPRESSION: ICD-10-CM

## 2019-12-12 RX ORDER — CITALOPRAM 40 MG/1
TABLET ORAL
Qty: 90 TABLET | Refills: 3 | Status: SHIPPED | OUTPATIENT
Start: 2019-12-12

## 2019-12-15 DIAGNOSIS — E03.9 ACQUIRED HYPOTHYROIDISM: ICD-10-CM

## 2019-12-16 RX ORDER — LEVOTHYROXINE SODIUM 0.07 MG/1
TABLET ORAL
Qty: 90 TABLET | Refills: 0 | Status: SHIPPED | OUTPATIENT
Start: 2019-12-16 | End: 2020-03-12

## 2020-02-29 DIAGNOSIS — E55.9 VITAMIN D DEFICIENCY: ICD-10-CM

## 2020-03-02 RX ORDER — ERGOCALCIFEROL 1.25 MG/1
CAPSULE ORAL
Qty: 12 CAPSULE | Refills: 5 | Status: SHIPPED | OUTPATIENT
Start: 2020-03-02

## 2020-03-12 DIAGNOSIS — E03.9 ACQUIRED HYPOTHYROIDISM: ICD-10-CM

## 2020-03-12 RX ORDER — LEVOTHYROXINE SODIUM 0.07 MG/1
TABLET ORAL
Qty: 90 TABLET | Refills: 0 | Status: SHIPPED | OUTPATIENT
Start: 2020-03-12 | End: 2020-06-09

## 2020-04-20 DIAGNOSIS — J30.2 SEASONAL ALLERGIES: ICD-10-CM

## 2020-04-20 DIAGNOSIS — R42 VERTIGO: ICD-10-CM

## 2020-04-20 DIAGNOSIS — E78.2 MIXED HYPERLIPIDEMIA: ICD-10-CM

## 2020-04-20 RX ORDER — CETIRIZINE HYDROCHLORIDE 10 MG/1
TABLET ORAL
Qty: 30 TABLET | Refills: 5 | Status: SHIPPED | OUTPATIENT
Start: 2020-04-20 | End: 2020-05-15

## 2020-04-20 RX ORDER — ROSUVASTATIN CALCIUM 10 MG/1
TABLET, COATED ORAL
Qty: 30 TABLET | Refills: 5 | Status: SHIPPED | OUTPATIENT
Start: 2020-04-20 | End: 2020-05-13

## 2020-05-13 DIAGNOSIS — E78.2 MIXED HYPERLIPIDEMIA: ICD-10-CM

## 2020-05-13 RX ORDER — ROSUVASTATIN CALCIUM 10 MG/1
TABLET, COATED ORAL
Qty: 30 TABLET | Refills: 5 | Status: SHIPPED | OUTPATIENT
Start: 2020-05-13

## 2020-05-15 DIAGNOSIS — J30.2 SEASONAL ALLERGIES: ICD-10-CM

## 2020-05-15 DIAGNOSIS — R42 VERTIGO: ICD-10-CM

## 2020-05-15 RX ORDER — CETIRIZINE HYDROCHLORIDE 10 MG/1
TABLET ORAL
Qty: 30 TABLET | Refills: 5 | Status: SHIPPED | OUTPATIENT
Start: 2020-05-15

## 2020-06-09 DIAGNOSIS — E03.9 ACQUIRED HYPOTHYROIDISM: ICD-10-CM

## 2020-06-09 RX ORDER — LEVOTHYROXINE SODIUM 0.07 MG/1
TABLET ORAL
Qty: 90 TABLET | Refills: 0 | Status: SHIPPED | OUTPATIENT
Start: 2020-06-09 | End: 2020-09-08

## 2020-08-17 ENCOUNTER — TRANSCRIBE ORDERS (OUTPATIENT)
Dept: ADMINISTRATIVE | Facility: HOSPITAL | Age: 60
End: 2020-08-17

## 2020-08-17 DIAGNOSIS — E03.8 OTHER SPECIFIED HYPOTHYROIDISM: ICD-10-CM

## 2020-08-17 DIAGNOSIS — Z11.59 ENCOUNTER FOR HEPATITIS C SCREENING TEST FOR LOW RISK PATIENT: Primary | ICD-10-CM

## 2020-08-17 DIAGNOSIS — E78.2 MIXED HYPERLIPIDEMIA: ICD-10-CM

## 2020-08-17 DIAGNOSIS — D75.1 POLYCYTHEMIA: ICD-10-CM

## 2020-08-17 DIAGNOSIS — Z11.4 SCREENING FOR HIV WITHOUT PRESENCE OF RISK FACTORS: ICD-10-CM

## 2020-08-17 DIAGNOSIS — I10 ELEVATED BLOOD PRESSURE READING IN OFFICE WITH WHITE COAT SYNDROME, WITH DIAGNOSIS OF HYPERTENSION: ICD-10-CM

## 2020-08-17 DIAGNOSIS — E55.9 VITAMIN D DEFICIENCY: ICD-10-CM

## 2020-09-08 DIAGNOSIS — E03.9 ACQUIRED HYPOTHYROIDISM: ICD-10-CM

## 2020-09-08 RX ORDER — LEVOTHYROXINE SODIUM 0.07 MG/1
TABLET ORAL
Qty: 90 TABLET | Refills: 0 | Status: SHIPPED | OUTPATIENT
Start: 2020-09-08

## 2022-04-06 ENCOUNTER — VBI (OUTPATIENT)
Dept: ADMINISTRATIVE | Facility: OTHER | Age: 62
End: 2022-04-06

## 2022-07-25 ENCOUNTER — VBI (OUTPATIENT)
Dept: ADMINISTRATIVE | Facility: OTHER | Age: 62
End: 2022-07-25

## 2022-08-23 ENCOUNTER — VBI (OUTPATIENT)
Dept: ADMINISTRATIVE | Facility: OTHER | Age: 62
End: 2022-08-23

## 2023-02-25 ENCOUNTER — VBI (OUTPATIENT)
Dept: ADMINISTRATIVE | Facility: OTHER | Age: 63
End: 2023-02-25

## 2024-04-17 ENCOUNTER — OFFICE VISIT (OUTPATIENT)
Dept: FAMILY MEDICINE CLINIC | Facility: CLINIC | Age: 64
End: 2024-04-17
Payer: COMMERCIAL

## 2024-04-17 VITALS
HEIGHT: 65 IN | WEIGHT: 293 LBS | RESPIRATION RATE: 18 BRPM | BODY MASS INDEX: 48.82 KG/M2 | OXYGEN SATURATION: 100 % | DIASTOLIC BLOOD PRESSURE: 80 MMHG | SYSTOLIC BLOOD PRESSURE: 138 MMHG | TEMPERATURE: 97.5 F | HEART RATE: 86 BPM

## 2024-04-17 DIAGNOSIS — E03.9 HYPOTHYROIDISM, UNSPECIFIED TYPE: Primary | ICD-10-CM

## 2024-04-17 DIAGNOSIS — E55.9 VITAMIN D DEFICIENCY: ICD-10-CM

## 2024-04-17 DIAGNOSIS — E78.5 HYPERLIPIDEMIA, UNSPECIFIED HYPERLIPIDEMIA TYPE: ICD-10-CM

## 2024-04-17 DIAGNOSIS — F32.9 REACTIVE DEPRESSION: ICD-10-CM

## 2024-04-17 DIAGNOSIS — F32.1 CURRENT MODERATE EPISODE OF MAJOR DEPRESSIVE DISORDER, UNSPECIFIED WHETHER RECURRENT (HCC): ICD-10-CM

## 2024-04-17 DIAGNOSIS — E66.01 MORBID OBESITY (HCC): ICD-10-CM

## 2024-04-17 DIAGNOSIS — R73.9 HYPERGLYCEMIA: ICD-10-CM

## 2024-04-17 DIAGNOSIS — E03.9 ACQUIRED HYPOTHYROIDISM: ICD-10-CM

## 2024-04-17 DIAGNOSIS — Z12.11 SCREENING FOR COLON CANCER: ICD-10-CM

## 2024-04-17 DIAGNOSIS — E78.2 MIXED HYPERLIPIDEMIA: ICD-10-CM

## 2024-04-17 DIAGNOSIS — Z12.31 SCREENING MAMMOGRAM FOR BREAST CANCER: ICD-10-CM

## 2024-04-17 PROBLEM — M25.50 JOINT PAIN: Status: RESOLVED | Noted: 2019-07-23 | Resolved: 2024-04-17

## 2024-04-17 PROBLEM — Z90.711 S/P PARTIAL HYSTERECTOMY WITH REMAINING CERVICAL STUMP: Status: RESOLVED | Noted: 2018-05-03 | Resolved: 2024-04-17

## 2024-04-17 PROCEDURE — 99204 OFFICE O/P NEW MOD 45 MIN: CPT | Performed by: INTERNAL MEDICINE

## 2024-04-17 RX ORDER — ROSUVASTATIN CALCIUM 10 MG/1
10 TABLET, COATED ORAL DAILY
Qty: 30 TABLET | Refills: 5 | Status: SHIPPED | OUTPATIENT
Start: 2024-04-17

## 2024-04-17 RX ORDER — ERGOCALCIFEROL 1.25 MG/1
50000 CAPSULE ORAL WEEKLY
Qty: 12 CAPSULE | Refills: 5 | Status: SHIPPED | OUTPATIENT
Start: 2024-04-17

## 2024-04-17 RX ORDER — CITALOPRAM 40 MG/1
40 TABLET ORAL DAILY
Qty: 90 TABLET | Refills: 3 | Status: SHIPPED | OUTPATIENT
Start: 2024-04-17

## 2024-04-17 RX ORDER — LEVOTHYROXINE SODIUM 0.07 MG/1
75 TABLET ORAL DAILY
Qty: 90 TABLET | Refills: 0 | Status: SHIPPED | OUTPATIENT
Start: 2024-04-17

## 2024-04-17 NOTE — PROGRESS NOTES
Name: Kimberly Montanez      : 1960      MRN: [id number]  Encounter Provider: Batsheva Gomes DO  Encounter Date: 2024   Encounter department: [facility]    Assessment & Plan     1. Hypothyroidism, unspecified type  -     TSH, 3rd generation; Future  Restart thyroid rx   Patient will take first thing in AM and isolate from other meds     2. Hyperlipidemia, unspecified hyperlipidemia type  -     Comprehensive metabolic panel; Future  -     Lipid panel; Future  Low fat diet, protion control  Weight mgmt referral She would prefer education/options for nonsurgicalweight loss program     3. Morbid obesity (HCC)  -     Ambulatory Referral to Weight Management; Future  -     Insulin, fasting; Future  Pt actively interested in weight loss and prefers to try without surgery initially    4. Hyperglycemia  -     Insulin, fasting; Future  -     Hemoglobin A1C; Future; Expected date: 2024    5. Current moderate episode of major depressive disorder, unspecified whether recurrent (HCC)  -     CBC and Platelet; Future  -     Comprehensive metabolic panel; Future  -     Vitamin D 25 hydroxy; Future  -     TSH, 3rd generation; Future  Resume medication which was helpful in past May need further medication/therapist referral but will reassess     6. Vitamin D deficiency  Comments:  Vitamin D def 11.7 Rx for Vitamin D 50,000 weekly provided  Orders:  -     ergocalciferol (VITAMIN D2) 50,000 units; Take 1 capsule (50,000 Units total) by mouth once a week    7. Reactive depression  Comments:  Rx for Celexa provided, pt enc to seek counseling with her local [place] [facility]  Orders:  -     citalopram (CeleXA) 40 mg tablet; Take 1 tablet (40 mg total) by mouth daily    8. Mixed hyperlipidemia  Comments:  Total chol 298    Pt requested medication at this time, is experiencing difficulty losing weight and excess fatigue  will start CrestorLabwork ordered 6   Orders:  -     rosuvastatin (CRESTOR) 10  MG tablet; Take 1 tablet (10 mg total) by mouth daily    9. Acquired hypothyroidism  Comments:  TSH 1.13 Levothyroxine to 75mcg   Orders:  -     levothyroxine 75 mcg tablet; Take 1 tablet (75 mcg total) by mouth daily    10. Screening mammogram for breast cancer  -     Mammo screening bilateral w 3d & cad; Future  Pt will schedule Mammo at [facility]    11. Screening for colon cancer  -     Cologuard  She is planning to try to complete cologard         Depression Screening and Follow-up Plan: Patient's depression screening was positive with a PHQ-9 score of 15. Patient assessed for underlying major depression. Brief counseling provided and recommend additional follow-up/re-evaluation next office visit.     Rto 3months/annual  Subjective      HPI  NP visit No routine medical care for 4 years She has hx of thyroid dz, depression, hyperlipidemia amd has been off meds for several years She is interested in getting back on track especially with restarting her depression medication which had been helpful in past especially with helping her sleep better Pt is raising her 12 year old granddaughter and works PT as [employer] No harmful thoughts but she has felt depressed She wants to lose weight and would like to see wt [facility] for nonsurgical options to start She has started trying to change her diet and eat less/healthier choices No chest pain She does not sleep well She does drink water moreso recently and is cutting down on iced tea No change in bowels Had cologard but never completed Last Mammo was many years ago at [facility] She is nonsmoker   Review of Systems   Constitutional:  Negative for activity change, chills and fever.   HENT: Negative.     Eyes:  Negative for visual disturbance.   Respiratory:  Negative for cough and shortness of breath.    Cardiovascular:  Negative for chest pain, palpitations and leg swelling.   Gastrointestinal:  Negative for abdominal distention and abdominal pain.   Genitourinary: Negative.   "  Musculoskeletal: Negative.    Neurological:  Negative for dizziness, light-headedness and headaches.   Psychiatric/Behavioral:  Positive for decreased concentration and sleep disturbance. Negative for suicidal ideas. The patient is not nervous/anxious.        Current Outpatient Medications on File Prior to Visit   Medication Sig    aspirin (ECOTRIN LOW STRENGTH) 81 mg EC tablet Take 81 mg by mouth    Multiple Vitamins-Minerals (MULTIVITAMIN ADULTS 50+) TABS Take by mouth    meclizine (ANTIVERT) 25 mg tablet Take 1 tablet (25 mg total) by mouth 3 (three) times a day as needed for dizziness for up to 15 days (Patient not taking: Reported on 4/17/2024)    [DISCONTINUED] cetirizine (ZyrTEC) 10 mg tablet take 1 tablet by mouth once daily (Patient not taking: Reported on 4/17/2024)    [DISCONTINUED] citalopram (CeleXA) 40 mg tablet take 1 tablet by mouth once daily (Patient not taking: Reported on 4/17/2024)    [DISCONTINUED] ergocalciferol (VITAMIN D2) 50,000 units take 1 capsule by mouth every week (Patient not taking: Reported on 4/17/2024)    [DISCONTINUED] levothyroxine 75 mcg tablet take 1 tablet by mouth once daily (Patient not taking: Reported on 4/17/2024)    [DISCONTINUED] Omega-3 Fatty Acids (FISH OIL) 1,000 mg Take 1,000 mg by mouth daily (Patient not taking: Reported on 4/17/2024)    [DISCONTINUED] rosuvastatin (CRESTOR) 10 MG tablet take 1 tablet by mouth once daily (Patient not taking: Reported on 4/17/2024)       Objective     /80   Pulse 86   Temp 97.5 °F (36.4 °C) (Temporal)   Resp 18   Ht 5' 5\" (1.651 m)   Wt (!) 144 kg (318 lb) Comment: pt declined (very anxious about first visit)  SpO2 100%   BMI 52.92 kg/m²   Depression Screening Follow-up Plan: Patient's depression screening was positive with a PHQ-2 score of . Their PHQ-9 score was 15. Patient assessed for underlying major depression. They have no active suicidal ideations. Brief counseling provided and recommend additional " follow-up/re-evaluation next office visit.   Physical Exam  Vitals and nursing note reviewed.   Constitutional:       General: She is not in acute distress.     Appearance: Normal appearance. She is not ill-appearing, toxic-appearing or diaphoretic.   HENT:      Head: Normocephalic and atraumatic.      Right Ear: External ear normal.      Left Ear: External ear normal.      Nose: Nose normal.      Mouth/Throat:      Mouth: Mucous membranes are moist.   Eyes:      General: No scleral icterus.  Cardiovascular:      Rate and Rhythm: Normal rate and regular rhythm.      Pulses: Normal pulses.      Heart sounds: Normal heart sounds. No murmur heard.  Pulmonary:      Effort: Pulmonary effort is normal. No respiratory distress.      Breath sounds: Normal breath sounds. No wheezing.   Abdominal:      General: Bowel sounds are normal. There is no distension.      Palpations: Abdomen is soft.      Tenderness: There is no abdominal tenderness.   Musculoskeletal:      Cervical back: Normal range of motion and neck supple.      Right lower leg: No edema.      Left lower leg: No edema.   Lymphadenopathy:      Cervical: No cervical adenopathy.   Skin:     General: Skin is warm and dry.   Neurological:      General: No focal deficit present.      Mental Status: She is alert and oriented to person, place, and time. Mental status is at baseline.      Cranial Nerves: No cranial nerve deficit.   Psychiatric:         Mood and Affect: Mood normal.         Behavior: Behavior normal.         Thought Content: Thought content normal.         Judgment: Judgment normal.       Batsheva Gomes DO

## 2024-06-24 ENCOUNTER — OFFICE VISIT (OUTPATIENT)
Dept: BARIATRICS | Facility: CLINIC | Age: 64
End: 2024-06-24

## 2024-06-24 VITALS
BODY MASS INDEX: 48.82 KG/M2 | WEIGHT: 293 LBS | HEIGHT: 65 IN | DIASTOLIC BLOOD PRESSURE: 92 MMHG | TEMPERATURE: 97.3 F | OXYGEN SATURATION: 99 % | SYSTOLIC BLOOD PRESSURE: 148 MMHG | HEART RATE: 77 BPM

## 2024-06-24 DIAGNOSIS — E66.01 CLASS 3 OBESITY (HCC): Primary | ICD-10-CM

## 2024-06-24 DIAGNOSIS — E78.2 MIXED HYPERLIPIDEMIA: ICD-10-CM

## 2024-06-24 PROCEDURE — 99243 OFF/OP CNSLTJ NEW/EST LOW 30: CPT | Performed by: PHYSICIAN ASSISTANT

## 2024-06-24 NOTE — PROGRESS NOTES
Assessment/Plan:    Class 3 obesity (HCC)  -Discussed options of HealthyCORE-Intensive Lifestyle Intervention Program, Very Low Calorie Diet-VLCD, Conservative Program, Jerome-En-Y Gastric Bypass, and Vertical Sleeve Gastrectomy and the role of weight loss medications.  -Initial weight loss goal of 5-10% weight loss for improved health  -not interested in bariatric surgery   -Caution Topamax and Wellbutrin: she was allergic to a depression medication, but does not recall the name   -Screening labs: as ordered by PCP   -Patient is interested in pursuing Conservative Program  -Does not have insurance coverage, consider AOM in f/u  -Calorie goals, sample menu, portion size guidelines, and food logging reviewed with the patient. She will start by cutting back on teas.     +STOP BANG (5):  -reviewed risks of undiagnosed/untreated sleep apnea.  -Recommended referral to sleep medicine provider for further evaluation. She is not sure if she is interested in referral, but right now does not have insurance, so will consider once she has coverage    Follow up in approximately  5 months  with Non-Surgical Physician/Advanced Practitioner.    Goals:  Food log (ie.) [email or web address],[email or web address],[email or web address],MyLikes.com,etc. baritastic  No sugary beverages. At least 64oz of water daily.  Increase physical activity by 10 minutes daily. Gradually increase physical activity to a goal of 5 days per week for 30 minutes of MODERATE intensity PLUS 2 days per week of FULL BODY resistance training  5-10 servings of fruits and vegetables per day and 25-35 grams of dietary fiber per day, gradually increasing  2298-0455 calories   If choosing starch pick whole grain/complex carbs and keep to 1/2 cup: oatmeal, brown rice, quinoa, whole wheat pasta, potatoes, sweet potato, chickpea noodles, red lentil noodles  Measure all portions: creamers, sugars, cooking oils/butters, condiments, dressings, etc and log calories    Diagnoses and all  "orders for this visit:    Class 3 obesity ([facility])  -     Ambulatory Referral to Weight Management    Body mass index 50.0-59.9, adult ([facility])        Subjective:   Chief Complaint   Patient presents with    Consult     NEWP to be established for weight management.     Patient ID: Kimberly Montanez  is a 64 y.o. female with excess weight/obesity here to pursue weight management.  Past Medical History:   Diagnosis Date    Anxiety     Depression 2013    Heart murmur     Obesity 1979    S/p partial hysterectomy with remaining cervical stump 05/03/2018     HPI:  Obesity/Excess Weight:  Severity: Very Severe  Onset:  entire life, worsened in     Modifiers: Commercial Weight Loss Programs-ie. Weight Watchers, Tracy Triston, Nutrisystem, etc. - lost 100 lbs prior to kids   Contributing factors: Poor Food Choices, Stress/Emotional Eating, and Depression, sweet tea   Associated symptoms: comorbid conditions and depression    Goals: 145 lb, but more realistically under 200 lbs  Hydration: water < tea (2 cups- varying sizes)   Alcohol: no  Dining out: 1-2x/month  Occupation: HC aide     B: 2 slices raisin bread  L: yogurt bar   D: varies (granddaughter is a pickey eater) protein + veg + starch OR pizza   S: no    The following portions of the patient's history were reviewed and updated as appropriate: allergies, current medications, past family history, past medical history, past social history, past surgical history, and problem list.    Review of Systems   Psychiatric/Behavioral: Negative.  Negative for suicidal ideas.      Objective:    /92   Pulse 77   Temp (!) 97.3 °F (36.3 °C)   Ht 5' 5\" (1.651 m)   Wt (!) 153 kg (336 lb 9.6 oz)   SpO2 99%   BMI 56.01 kg/m²     Physical Exam  Vitals and nursing note reviewed.     Constitutional   General appearance: Abnormal.  well developed and obese.   Pulmonary   Respiratory effort: No increased work of breathing or signs of respiratory distress.    Abdomen   Abdomen: Abnormal.  The " abdomen was obese.   Musculoskeletal   Gait and station: Normal.    Psychiatric   Orientation to person, place and time: Normal.    Affect: appropriate    35 minute visit, >50% time spent counseling patient on surgical and nonsurgical interventions for the treatment of excess weight.  Discussed in detail nonsurgical options including intensive lifestyle intervention program, very low-calorie diet program and conservative program.  Discussed the role of weight loss medications.  Counseled patient on diet behavior and exercise modification for weight loss.

## 2024-06-24 NOTE — PATIENT INSTRUCTIONS
Goals:  Food log (ie.) www.[email or web address],[email or web address],loseit.com,[employer].com,etc. baritastic  No sugary beverages. At least 64oz of water daily.  Increase physical activity by 10 minutes daily. Gradually increase physical activity to a goal of 5 days per week for 30 minutes of MODERATE intensity PLUS 2 days per week of FULL BODY resistance training  5-10 servings of fruits and vegetables per day and 25-35 grams of dietary fiber per day, gradually increasing  0624-9674 calories   If choosing starch pick whole grain/complex carbs and keep to 1/2 cup: oatmeal, brown rice, quinoa, whole wheat pasta, potatoes, sweet potato, chickpea noodles, red lentil noodles  Measure all portions: creamers, sugars, cooking oils/butters, condiments, dressings, etc and log calories

## 2024-06-24 NOTE — ASSESSMENT & PLAN NOTE
-Discussed options of HealthyCORE-Intensive Lifestyle Intervention Program, Very Low Calorie Diet-VLCD, Conservative Program, Jerome-En-Y Gastric Bypass, and Vertical Sleeve Gastrectomy and the role of weight loss medications.  -Initial weight loss goal of 5-10% weight loss for improved health  -not interested in bariatric surgery   -Caution Topamax and Wellbutrin: she was allergic to a depression medication, but does not recall the name   -Screening labs: as ordered by PCP   -Patient is interested in pursuing Conservative Program  -Does not have insurance coverage, consider AOM in f/u  -Calorie goals, sample menu, portion size guidelines, and food logging reviewed with the patient. She will start by cutting back on teas.     +STOP BANG (5):  -reviewed risks of undiagnosed/untreated sleep apnea.  -Recommended referral to sleep medicine provider for further evaluation. She is not sure if she is interested in referral, but right now does not have insurance, so will consider once she has coverage

## 2024-06-25 RX ORDER — ROSUVASTATIN CALCIUM 10 MG/1
10 TABLET, COATED ORAL DAILY
Qty: 30 TABLET | Refills: 0 | Status: SHIPPED | OUTPATIENT
Start: 2024-06-25

## 2024-07-17 DIAGNOSIS — E03.9 ACQUIRED HYPOTHYROIDISM: ICD-10-CM

## 2024-07-17 RX ORDER — LEVOTHYROXINE SODIUM 0.07 MG/1
75 TABLET ORAL DAILY
Qty: 30 TABLET | Refills: 0 | Status: SHIPPED | OUTPATIENT
Start: 2024-07-17

## 2024-07-17 NOTE — TELEPHONE ENCOUNTER
Reason for call:   [x] Refill   [] Prior Auth  [] Other:     Office:   [x] PCP/Provider -   [] Specialty/Provider -     Medication: levothyroxine 75 mcg tablet Take 1 tablet (75 mcg total) by mouth daily       Pharmacy: [id number] - [place] - [address] [place] [address]     Does the patient have enough for 3 days?   [x] Yes   [] No - Send as HP to POD

## 2024-07-22 ENCOUNTER — HOSPITAL ENCOUNTER (OUTPATIENT)
Dept: MAMMOGRAPHY | Facility: HOSPITAL | Age: 64
Discharge: HOME | End: 2024-07-22
Payer: COMMERCIAL

## 2024-07-22 VITALS — WEIGHT: 293 LBS | BODY MASS INDEX: 48.82 KG/M2 | HEIGHT: 65 IN

## 2024-07-22 DIAGNOSIS — Z12.31 SCREENING MAMMOGRAM FOR BREAST CANCER: ICD-10-CM

## 2024-07-22 PROCEDURE — 77067 SCR MAMMO BI INCL CAD: CPT

## 2024-07-22 PROCEDURE — 77063 BREAST TOMOSYNTHESIS BI: CPT

## 2024-08-07 DIAGNOSIS — E78.2 MIXED HYPERLIPIDEMIA: ICD-10-CM

## 2024-08-07 RX ORDER — ROSUVASTATIN CALCIUM 10 MG/1
10 TABLET, COATED ORAL DAILY
Qty: 90 TABLET | Refills: 3 | Status: SHIPPED | OUTPATIENT
Start: 2024-08-07 | End: 2024-08-16 | Stop reason: SDUPTHER

## 2024-08-14 ENCOUNTER — APPOINTMENT (OUTPATIENT)
Dept: LAB | Facility: HOSPITAL | Age: 64
End: 2024-08-14
Payer: COMMERCIAL

## 2024-08-14 DIAGNOSIS — F32.1 CURRENT MODERATE EPISODE OF MAJOR DEPRESSIVE DISORDER, UNSPECIFIED WHETHER RECURRENT (HCC): ICD-10-CM

## 2024-08-14 DIAGNOSIS — R73.9 HYPERGLYCEMIA: ICD-10-CM

## 2024-08-14 DIAGNOSIS — E66.01 MORBID OBESITY (HCC): ICD-10-CM

## 2024-08-14 DIAGNOSIS — E03.9 HYPOTHYROIDISM, UNSPECIFIED TYPE: ICD-10-CM

## 2024-08-14 DIAGNOSIS — E78.5 HYPERLIPIDEMIA, UNSPECIFIED HYPERLIPIDEMIA TYPE: ICD-10-CM

## 2024-08-14 LAB
25(OH)D3 SERPL-MCNC: 47.5 NG/ML (ref 30–100)
ALBUMIN SERPL BCG-MCNC: 3.8 G/DL (ref 3.5–5)
ALP SERPL-CCNC: 79 U/L (ref 34–104)
ALT SERPL W P-5'-P-CCNC: 12 U/L (ref 7–52)
ANION GAP SERPL CALCULATED.3IONS-SCNC: 9 MMOL/L (ref 4–13)
AST SERPL W P-5'-P-CCNC: 17 U/L (ref 13–39)
BILIRUB SERPL-MCNC: 0.64 MG/DL (ref 0.2–1)
BUN SERPL-MCNC: 13 MG/DL (ref 5–25)
CALCIUM SERPL-MCNC: 9.1 MG/DL (ref 8.4–10.2)
CHLORIDE SERPL-SCNC: 104 MMOL/L (ref 96–108)
CHOLEST SERPL-MCNC: 216 MG/DL
CO2 SERPL-SCNC: 28 MMOL/L (ref 21–32)
CREAT SERPL-MCNC: 1.1 MG/DL (ref 0.6–1.3)
ERYTHROCYTE [DISTWIDTH] IN BLOOD BY AUTOMATED COUNT: 13.1 % (ref 11.6–15.1)
EST. AVERAGE GLUCOSE BLD GHB EST-MCNC: 100 MG/DL
GFR SERPL CREATININE-BSD FRML MDRD: 53 ML/MIN/1.73SQ M
GLUCOSE P FAST SERPL-MCNC: 93 MG/DL (ref 65–99)
HBA1C MFR BLD: 5.1 %
HCT VFR BLD AUTO: 49 % (ref 34.8–46.1)
HDLC SERPL-MCNC: 54 MG/DL
HGB BLD-MCNC: 15.7 G/DL (ref 11.5–15.4)
INSULIN SERPL-ACNC: 7.19 UIU/ML (ref 1.9–23)
LDLC SERPL CALC-MCNC: 139 MG/DL (ref 0–100)
MCH RBC QN AUTO: 29 PG (ref 26.8–34.3)
MCHC RBC AUTO-ENTMCNC: 32 G/DL (ref 31.4–37.4)
MCV RBC AUTO: 90 FL (ref 82–98)
NONHDLC SERPL-MCNC: 162 MG/DL
PLATELET # BLD AUTO: 198 THOUSANDS/UL (ref 149–390)
PMV BLD AUTO: 9.5 FL (ref 8.9–12.7)
POTASSIUM SERPL-SCNC: 4.2 MMOL/L (ref 3.5–5.3)
PROT SERPL-MCNC: 6.4 G/DL (ref 6.4–8.4)
RBC # BLD AUTO: 5.42 MILLION/UL (ref 3.81–5.12)
SODIUM SERPL-SCNC: 141 MMOL/L (ref 135–147)
TRIGL SERPL-MCNC: 115 MG/DL
TSH SERPL DL<=0.05 MIU/L-ACNC: 3.61 UIU/ML (ref 0.45–4.5)
WBC # BLD AUTO: 5.73 THOUSAND/UL (ref 4.31–10.16)

## 2024-08-14 PROCEDURE — 82306 VITAMIN D 25 HYDROXY: CPT

## 2024-08-14 PROCEDURE — 83525 ASSAY OF INSULIN: CPT

## 2024-08-14 PROCEDURE — 83036 HEMOGLOBIN GLYCOSYLATED A1C: CPT

## 2024-08-14 PROCEDURE — 80061 LIPID PANEL: CPT

## 2024-08-14 PROCEDURE — 84443 ASSAY THYROID STIM HORMONE: CPT

## 2024-08-14 PROCEDURE — 85027 COMPLETE CBC AUTOMATED: CPT

## 2024-08-14 PROCEDURE — 36415 COLL VENOUS BLD VENIPUNCTURE: CPT

## 2024-08-14 PROCEDURE — 80053 COMPREHEN METABOLIC PANEL: CPT

## 2024-08-16 ENCOUNTER — OFFICE VISIT (OUTPATIENT)
Dept: FAMILY MEDICINE CLINIC | Facility: CLINIC | Age: 64
End: 2024-08-16
Payer: COMMERCIAL

## 2024-08-16 VITALS
BODY MASS INDEX: 55.91 KG/M2 | TEMPERATURE: 97.3 F | HEIGHT: 65 IN | RESPIRATION RATE: 18 BRPM | DIASTOLIC BLOOD PRESSURE: 72 MMHG | OXYGEN SATURATION: 97 % | SYSTOLIC BLOOD PRESSURE: 130 MMHG | HEART RATE: 85 BPM

## 2024-08-16 DIAGNOSIS — Z00.00 ANNUAL PHYSICAL EXAM: Primary | ICD-10-CM

## 2024-08-16 DIAGNOSIS — M25.511 CHRONIC PAIN OF BOTH SHOULDERS: ICD-10-CM

## 2024-08-16 DIAGNOSIS — G89.29 CHRONIC PAIN OF BOTH SHOULDERS: ICD-10-CM

## 2024-08-16 DIAGNOSIS — M25.512 CHRONIC PAIN OF BOTH SHOULDERS: ICD-10-CM

## 2024-08-16 DIAGNOSIS — E03.9 ACQUIRED HYPOTHYROIDISM: ICD-10-CM

## 2024-08-16 DIAGNOSIS — E78.2 MIXED HYPERLIPIDEMIA: ICD-10-CM

## 2024-08-16 PROCEDURE — 99396 PREV VISIT EST AGE 40-64: CPT | Performed by: INTERNAL MEDICINE

## 2024-08-16 RX ORDER — LEVOTHYROXINE SODIUM 75 UG/1
75 TABLET ORAL DAILY
Qty: 90 TABLET | Refills: 3 | Status: SHIPPED | OUTPATIENT
Start: 2024-08-16

## 2024-08-16 RX ORDER — ROSUVASTATIN CALCIUM 10 MG/1
10 TABLET, COATED ORAL DAILY
Qty: 90 TABLET | Refills: 3 | Status: SHIPPED | OUTPATIENT
Start: 2024-08-16

## 2024-08-16 NOTE — PROGRESS NOTES
Adult Annual Physical  Name: Kimberly Montanez      : 1960      MRN: [id number]  Encounter Provider: Batsheva Gomes DO  Encounter Date: 2024   Encounter department: [facility]    Assessment & Plan   1. Annual physical exam  Lo carb/lo fat diet Stay hydrated - she is improving with that  Labs reviewed millie cholesterol and pt will work on diet   2. Acquired hypothyroidism  Comments:  TSH 1.13 Levothyroxine to 75mcg   Orders:  -     levothyroxine 75 mcg tablet; Take 1 tablet (75 mcg total) by mouth daily  3. Mixed hyperlipidemia  Comments:  Total chol 298    Pt requested medication at this time, is experiencing difficulty losing weight and excess fatigue  will start CrestorLabwork ordered 6   Orders:  -     rosuvastatin (CRESTOR) 10 MG tablet; Take 1 tablet (10 mg total) by mouth daily  4. Chronic pain of both shoulders  -     XR spine cervical complete 4 or 5 vw non injury; Future; Expected date: 2024  -     XR shoulder 2+ vw right; Future; Expected date: 2024  -     XR shoulder 2+ vw left; Future; Expected date: 2024  Check xrays but suspect arthritis as cause Consider Pt eval     Immunizations and preventive care screenings were discussed with patient today. Appropriate education was printed on patient's after visit summary.    Counseling:  Exercise: the importance of regular exercise/physical activity was discussed. Recommend exercise 3-5 times per week for at least 30 minutes.       Depression Screening and Follow-up Plan: Patient's depression screening was positive with a PHQ-9 score of 22. Patient assessed for underlying major depression. Brief counseling provided and recommend additional follow-up/re-evaluation next office visit. Agrees to psych eval      History of Present Illness   Pt da [place] bit her GD moved back with her Mom so pt misses her She is complaining of b/l shoulder pain no trauma Feels may be age related No numbness or tingling She is a bit down  since her GD not with her and diet has changed subsequently - less structured She has some friends but no interest in going out     Adult Annual Physical  Review of Systems   Constitutional:  Negative for chills and fever.   HENT: Negative.     Eyes:  Negative for visual disturbance.   Respiratory:  Negative for cough and shortness of breath.    Cardiovascular:  Negative for chest pain, palpitations and leg swelling.   Gastrointestinal: Negative.    Genitourinary: Negative.    Musculoskeletal:  Positive for arthralgias. Negative for joint swelling.   Neurological:  Negative for dizziness, light-headedness and headaches.   Psychiatric/Behavioral:  Negative for sleep disturbance. The patient is not nervous/anxious.      Past Medical History:   Diagnosis Date    Anxiety     Depression 2013    Heart murmur     Obesity 1979    S/p partial hysterectomy with remaining cervical stump 05/03/2018     Past Surgical History:   Procedure Laterality Date    TOTAL ABDOMINAL HYSTERECTOMY      TUBAL LIGATION      WISDOM TOOTH EXTRACTION         Social History     Socioeconomic History    Marital status: Single     Spouse name: Not on file    Number of children: Not on file    Years of education: Not on file    Highest education level: Not on file   Occupational History    Not on file   Tobacco Use    Smoking status: Never    Smokeless tobacco: Never   Vaping Use    Vaping status: Never Used   Substance and Sexual Activity    Alcohol use: No    Drug use: No    Sexual activity: Not Currently     Partners: Male   Other Topics Concern    Not on file   Social History Narrative    Not on file     Social Determinants of Health     Financial Resource Strain: Not on file   Food Insecurity: Not on file   Transportation Needs: Not on file   Physical Activity: Not on file   Stress: Not on file   Social Connections: Not on file   Intimate Partner Violence: Not on file   Housing Stability: Not on file     No Known Allergies        Objective  "    /72   Pulse 85   Temp (!) 97.3 °F (36.3 °C) (Temporal)   Resp 18   Ht 5' 5\" (1.651 m)   SpO2 97%   BMI 55.91 kg/m²     Physical Exam  Vitals and nursing note reviewed.   Constitutional:       General: She is not in acute distress.     Appearance: Normal appearance. She is not ill-appearing, toxic-appearing or diaphoretic.   HENT:      Head: Normocephalic and atraumatic.      Right Ear: External ear normal.      Left Ear: External ear normal.      Nose: Nose normal.      Mouth/Throat:      Mouth: Mucous membranes are moist.   Eyes:      General: No scleral icterus.     Extraocular Movements: Extraocular movements intact.      Conjunctiva/sclera: Conjunctivae normal.      Pupils: Pupils are equal, round, and reactive to light.   Cardiovascular:      Rate and Rhythm: Normal rate and regular rhythm.      Pulses: Normal pulses.      Heart sounds: Normal heart sounds.   Pulmonary:      Effort: Pulmonary effort is normal. No respiratory distress.      Breath sounds: Normal breath sounds. No wheezing.   Abdominal:      General: Bowel sounds are normal. There is no distension.      Palpations: Abdomen is soft.      Tenderness: There is no abdominal tenderness.   Musculoskeletal:         General: Tenderness present.      Cervical back: Normal range of motion and neck supple.      Right lower leg: No edema.      Left lower leg: No edema.   Lymphadenopathy:      Cervical: No cervical adenopathy.   Skin:     General: Skin is warm and dry.   Neurological:      General: No focal deficit present.      Mental Status: She is alert and oriented to person, place, and time. Mental status is at baseline.      Cranial Nerves: No cranial nerve deficit.   Psychiatric:         Mood and Affect: Mood normal.         Behavior: Behavior normal.         Thought Content: Thought content normal.         Judgment: Judgment normal.       "

## 2024-08-16 NOTE — PATIENT INSTRUCTIONS
"Patient Education     Routine physical for adults   The Basics   Written by the doctors and editors at [facility]   What is a physical? -- A physical is a routine visit, or \"check-up,\" with your doctor. You might also hear it called a \"wellness visit\" or \"preventive visit.\"  During each visit, the doctor will:   Ask about your physical and mental health   Ask about your habits, behaviors, and lifestyle   Do an exam   Give you vaccines if needed   Talk to you about any medicines you take   Give advice about your health   Answer your questions  Getting regular check-ups is an important part of taking care of your health. It can help your doctor find and treat any problems you have. But it's also important for preventing health problems.  A routine physical is different from a \"sick visit.\" A sick visit is when you see a doctor because of a health concern or problem. Since physicals are scheduled ahead of time, you can think about what you want to ask the doctor.  How often should I get a physical? -- It depends on your age and health. In general, for people age 21 years and older:   If you are younger than 50 years, you might be able to get a physical every 3 years.   If you are 50 years or older, your doctor might recommend a physical every year.  If you have an ongoing health condition, like diabetes or high blood pressure, your doctor will probably want to see you more often.  What happens during a physical? -- In general, each visit will include:   Physical exam - The doctor or nurse will check your height, weight, heart rate, and blood pressure. They will also look at your eyes and ears. They will ask about how you are feeling and whether you have any symptoms that bother you.   Medicines - It's a good idea to bring a list of all the medicines you take to each doctor visit. Your doctor will talk to you about your medicines and answer any questions. Tell them if you are having any side effects that bother you. You " "should also tell them if you are having trouble paying for any of your medicines.   Habits and behaviors - This includes:   Your diet   Your exercise habits   Whether you smoke, drink alcohol, or use drugs   Whether you are sexually active   Whether you feel safe at home  Your doctor will talk to you about things you can do to improve your health and lower your risk of health problems. They will also offer help and support. For example, if you want to quit smoking, they can give you advice and might prescribe medicines. If you want to improve your diet or get more physical activity, they can help you with this, too.   Lab tests, if needed - The tests you get will depend on your age and situation. For example, your doctor might want to check your:   Cholesterol   Blood sugar   Iron level   Vaccines - The recommended vaccines will depend on your age, health, and what vaccines you already had. Vaccines are very important because they can prevent certain serious or deadly infections.   Discussion of screening - \"Screening\" means checking for diseases or other health problems before they cause symptoms. Your doctor can recommend screening based on your age, risk, and preferences. This might include tests to check for:   Cancer, such as breast, prostate, cervical, ovarian, colorectal, prostate, lung, or skin cancer   Sexually transmitted infections, such as chlamydia and gonorrhea   Mental health conditions like depression and anxiety  Your doctor will talk to you about the different types of screening tests. They can help you decide which screenings to have. They can also explain what the results might mean.   Answering questions - The physical is a good time to ask the doctor or nurse questions about your health. If needed, they can refer you to other doctors or specialists, too.  Adults older than 65 years often need other care, too. As you get older, your doctor will talk to you about:   How to prevent falling at " home   Hearing or vision tests   Memory testing   How to take your medicines safely   Making sure that you have the help and support you need at home  All topics are updated as new evidence becomes available and our peer review process is complete.  This topic retrieved from [employer] on: May 02, 2024.  Topic 193953 Version 1.0  Release: 32.4.3 - C32.122  © 2024 UpToDate, Inc. and/or its affiliates. All rights reserved.  Consumer Information Use and Disclaimer   Disclaimer: This generalized information is a limited summary of diagnosis, treatment, and/or medication information. It is not meant to be comprehensive and should be used as a tool to help the user understand and/or assess potential diagnostic and treatment options. It does NOT include all information about conditions, treatments, medications, side effects, or risks that may apply to a specific patient. It is not intended to be medical advice or a substitute for the medical advice, diagnosis, or treatment of a health care provider based on the health care provider's examination and assessment of a patient's specific and unique circumstances. Patients must speak with a health care provider for complete information about their health, medical questions, and treatment options, including any risks or benefits regarding use of medications. This information does not endorse any treatments or medications as safe, effective, or approved for treating a specific patient. UpToDate, Inc. and its affiliates disclaim any warranty or liability relating to this information or the use thereof.The use of this information is governed by the Terms of Use, available at https://www.woltersUni-Pixeluwer.com/en/know/clinical-effectiveness-terms. 2024© UpToDate, Inc. and its affiliates and/or licensors. All rights reserved.  Copyright   © 2024 UpToDate, Inc. and/or its affiliates. All rights reserved.

## 2024-08-27 ENCOUNTER — VBI (OUTPATIENT)
Dept: ADMINISTRATIVE | Facility: OTHER | Age: 64
End: 2024-08-27

## 2024-08-27 NOTE — TELEPHONE ENCOUNTER
08/27/24 8:19 AM     Chart reviewed for CRC: Colonoscopy ; nothing is submitted to the patient's insurance at this time.     Sherri Jones   [facility]

## 2024-09-06 ENCOUNTER — APPOINTMENT (OUTPATIENT)
Dept: RADIOLOGY | Facility: MEDICAL CENTER | Age: 64
End: 2024-09-06
Payer: COMMERCIAL

## 2024-09-06 DIAGNOSIS — M25.512 CHRONIC PAIN OF BOTH SHOULDERS: ICD-10-CM

## 2024-09-06 DIAGNOSIS — G89.29 CHRONIC PAIN OF BOTH SHOULDERS: ICD-10-CM

## 2024-09-06 DIAGNOSIS — M25.511 CHRONIC PAIN OF BOTH SHOULDERS: ICD-10-CM

## 2024-09-06 PROCEDURE — 72050 X-RAY EXAM NECK SPINE 4/5VWS: CPT

## 2024-09-06 PROCEDURE — 73030 X-RAY EXAM OF SHOULDER: CPT

## 2024-09-09 ENCOUNTER — TELEPHONE (OUTPATIENT)
Dept: FAMILY MEDICINE CLINIC | Facility: CLINIC | Age: 64
End: 2024-09-09

## 2024-09-09 NOTE — TELEPHONE ENCOUNTER
LVM to return call     Moderate degenerative changes/narrowing right shoulder and mild left shoudler on xray   Would consider PT  eval and ortho followup if no improvement

## 2024-09-24 ENCOUNTER — OFFICE VISIT (OUTPATIENT)
Age: 64
End: 2024-09-24

## 2024-09-24 DIAGNOSIS — F33.2 MAJOR DEPRESSIVE DISORDER, RECURRENT SEVERE WITHOUT PSYCHOTIC FEATURES (HCC): Primary | ICD-10-CM

## 2024-09-24 RX ORDER — VENLAFAXINE HYDROCHLORIDE 37.5 MG/1
75 CAPSULE, EXTENDED RELEASE ORAL DAILY
Qty: 60 CAPSULE | Refills: 1 | Status: SHIPPED | OUTPATIENT
Start: 2024-09-24 | End: 2024-11-23

## 2024-09-25 ENCOUNTER — TELEPHONE (OUTPATIENT)
Age: 64
End: 2024-09-25

## 2024-09-25 NOTE — PSYCH
" PSYCHIATRIC EVALUATION     [facility]    Name and Date of Birth:  Kimberly Montanez 64 y.o. 1960 MRN: [id number]    Date of Visit: September 24, 2024    TIME STARTED: I have spent 60 minutes with Patient  today, starting time 1400, ending time 1500    Reason for visit: Initial psychiatric intake assessment    Chief complaint: \"I'm really depressed since my daughter took back my granddaughter\"    History of Present Illness (HPI):      Kimberly Montanez is a 64 y.o., female, possessing a previous psychiatric history significant for depression, anxiety, medically complicated by HLD, hypothyroidism, presenting to the [facility] outpatient clinic for intake assessment.     Symptoms prior to presentation include >3 months of worsening of symptoms of major depressive disorder including poor sleep (sleeping 4-6 hours per night, often interrupted), anhedonia, low energy and concentration, feelings of worthlessness, psychomotor retardation, and suicidal ideation without plan (passive death wish). She denies a history of suicide attempts and non-suicidal self injurious behavior. She endorses symptoms of panic disorder with agoraphobia such as experiencing panic attacks (marked chest pain, shortness of breath, dizziness, palpitations, feelings of impending doom) once or twice per week that are triggered by being in crowded places outside of her home of which she feels like she cannot escape that cause her to avoid such environments. She denies symptoms of substance abuse including alcohol, cannabis, opioids, methamphetamine, and cocaine use. She denies a history of symptoms consistent with manic/hypomanic episodes. She denies a history of auditory and visual hallucinations.    Kimberly TERRAZAS states that she has struggled with depression throughout her life in the context of a challenging marriage with her ex-, feeling disconnected and unwanted by her " children, and struggling financially. She notes that all three of her children have suffered from severe mental illness including depression and substance abuse, and have attempted suicide. She often feels guilty for feeling as if she has not done enough for her children. She notes that her depressive symptoms worsened after her eldest daughter took back her daughter (patient's granddaughter) 3 months ago after the patient had spent the past 8 years raising her granddaughter. She now only sees her granddaughter every other weekend, and feels as if she has lost her purpose in life. She does have a psychotherapist whom she sees weekly, but still endorses that her depression has been worsening for the past several months. She does not believe that Celexa has ever been effective at helping her depressive symptoms.     Presently, patient denies suicidal/homicidal ideation in addition to thoughts of self-injury; contracts for safety, see below for risk assessment. At conclusion of evaluation, patient is amenable to the recommendations of this writer including: medication management.  Also, patient is amenable to calling/contacting the outpatient office including this writer if any acute adverse effects of their medication regimen arise in addition to any comments or concerns pertaining to their psychiatric management.  Patient is amenable to calling/contacting crisis and/or attending to the nearest emergency department if their clinical condition deteriorates to assure their safety and stability, stating that they are able to appropriately confide in their psychiatrist and therapist regarding their psychiatric state.    Current Rating Scores:     None completed today.    Psychiatric Review Of Systems:    Appetite: increased  Adverse eating: no  Weight changes: no  Insomnia/sleeplessness: increased  Fatigue/anergy: yes  Anhedonia/lack of interest: yes  Attention/concentration: decreased  Psychomotor agitation/retardation:  yes  Somatic symptoms: no  Anxiety/panic attack: panic attacks, worrying daily  Radha/hypomania: no  Hopelessness/helplessness/worthlessness: yes  Self-injurious behavior/high-risk behavior: no  Suicidal ideation: yes, passive death wish  Homicidal ideation: no  Auditory hallucinations: no  Visual hallucinations: no  Other perceptual disturbances: no  Delusional thinking: no  Obsessive/compulsive symptoms: no    Review Of Systems:    Constitutional negative   ENT negative   Cardiovascular negative   Respiratory negative   Gastrointestinal negative   Genitourinary negative   Musculoskeletal negative   Integumentary negative   Neurological negative   Endocrine negative   Pain denies   Pain Level       Other Symptoms none, all other systems are negative       Family Psychiatric History:   Suicide attempts in all three of her children; no hx of bipolar or schizophrenia in family    Past Psychiatric History:     Previous inpatient psychiatric admissions: denies.  Previous inpatient/outpatient substance abuse rehabilitation: denies.  Present/previous outpatient psychiatric treatment: denies.  Present/previous psychotherapy: In therapy.  History of suicidal attempts/gestures: Denies.  History of violence/aggressive behaviors: denies.  Present/previous psychotropic medication use: Celexa 40mg QD.    Substance Abuse History:    Patient denies history of alcohol, illict substance, or tobacco abuse.    Kimberly TERRAZAS does not apear under the influence or withdrawal of any psychoactive substance throughout today's examination.     Social History:    Educational History:  Academic history: high school diploma/GED  Marital history:   Social support system: parents, extended family, and friend(s)  Residential history: Resides in home; lives by herself  Vocational History: works as healthcare aid  Access to guns/weapons: none  Legal History: denies    Traumatic History:     Abuse:emotional and verbal  Other Traumatic Events:   defer    Past Medical History:    Past Medical History:   Diagnosis Date    Anxiety     Depression 2013    Heart murmur     Obesity 1979    S/p partial hysterectomy with remaining cervical stump 05/03/2018        Past Surgical History:   Procedure Laterality Date    TOTAL ABDOMINAL HYSTERECTOMY      TUBAL LIGATION      WISDOM TOOTH EXTRACTION       No Known Allergies    History Review:    The following portions of the patient's history were reviewed and updated as appropriate: allergies, current medications, past family history, past medical history, past social history, past surgical history, and problem list.    OBJECTIVE:    Vital signs in last 24 hours:    There were no vitals filed for this visit.    Mental Status Evaluation:    Appearance age appropriate, casually dressed   Behavior cooperative, appears anxious   Speech normal rate, normal volume, normal pitch   Mood depressed, anxious   Affect constricted, tearful   Thought Processes organized, goal directed   Associations intact associations   Thought Content no overt delusions   Perceptual Disturbances: no auditory hallucinations, no visual hallucinations   Abnormal Thoughts  Risk Potential Suicidal ideation - None  Homicidal ideation - None  Potential for aggression - No   Orientation oriented to person, place, time/date, and situation   Memory recent and remote memory grossly intact   Consciousness alert and awake   Attention Span Concentration Span attention span and concentration are age appropriate   Intellect appears to be of average intelligence   Insight intact   Judgement intact   Muscle Strength and  Gait normal muscle strength and normal muscle tone, normal gait and normal balance   Motor Activity no abnormal movements   Language no difficulty naming common objects, no difficulty repeating a phrase, no difficulty writing a sentence   Fund of Knowledge adequate knowledge of current events  adequate fund of knowledge regarding past  history  adequate fund of knowledge regarding vocabulary        Laboratory Results: I have personally reviewed all pertinent laboratory/tests results    Recent Labs (last 4 months):   Appointment on 08/14/2024   Component Date Value    WBC 08/14/2024 5.73     RBC 08/14/2024 5.42 (H)     Hemoglobin 08/14/2024 15.7 (H)     Hematocrit 08/14/2024 49.0 (H)     MCV 08/14/2024 90     MCH 08/14/2024 29.0     MCHC 08/14/2024 32.0     RDW 08/14/2024 13.1     Platelets 08/14/2024 198     MPV 08/14/2024 9.5     Sodium 08/14/2024 141     Potassium 08/14/2024 4.2     Chloride 08/14/2024 104     CO2 08/14/2024 28     ANION GAP 08/14/2024 9     BUN 08/14/2024 13     Creatinine 08/14/2024 1.10     Glucose, Fasting 08/14/2024 93     Calcium 08/14/2024 9.1     AST 08/14/2024 17     ALT 08/14/2024 12     Alkaline Phosphatase 08/14/2024 79     Total Protein 08/14/2024 6.4     Albumin 08/14/2024 3.8     Total Bilirubin 08/14/2024 0.64     eGFR 08/14/2024 53     Cholesterol 08/14/2024 216 (H)     Triglycerides 08/14/2024 115     HDL, Direct 08/14/2024 54     LDL Calculated 08/14/2024 139 (H)     Non-HDL-Chol (CHOL-HDL) 08/14/2024 162     Vit D, 25-Hydroxy 08/14/2024 47.5     Insulin, Fasting 08/14/2024 7.19     TSH 3RD GENERATON 08/14/2024 3.610     Hemoglobin A1C 08/14/2024 5.1     EAG 08/14/2024 100        Assessment/Plan:   Diagnoses and all orders for this visit:    Major depressive disorder, recurrent severe without psychotic features (HCC)  -     venlafaxine (EFFEXOR-XR) 37.5 mg 24 hr capsule; Take 2 capsules (75 mg total) by mouth daily For first 5 days, take 1 capsule (37.5mg) by mouth daily       65 y/o female with past psychiatric history of depression and anxiety presenting with severe episode of major depressive without component of psychosis, nakul, or substance abuse in the context of psychosocial stressors. Celexa has been ineffective for patient. Will cross-titrate Celexa 40mg for Effexor 75mg. Not suicidal with plan  (though passive death wish present), no indication for a higher level of care at this time.  Follow up in 3 weeks.    Treatment Recommendations/Precautions:    Effexor 75mg QD (37.5mg for 4 days after titrating down Celexa 40mg QD by 20mg every 4 days).  Continue psychotherapy.   Follow up in 3 weeks.  Aware of 24 hour and weekend coverage for urgent situations accessed by calling [facility] main practice number      Controlled Medication Discussion:     Not applicable    This note was not shared with the patient due to reasonable likelihood of causing patient harm    Alex Espino MD 09/24/24

## 2024-09-25 NOTE — TELEPHONE ENCOUNTER
LMOM asking patient to please call office back at [phone] to schedule 3 week follow up with Dr. Espino in [facility].

## 2024-10-15 ENCOUNTER — OFFICE VISIT (OUTPATIENT)
Age: 64
End: 2024-10-15

## 2024-10-15 DIAGNOSIS — F33.2 MAJOR DEPRESSIVE DISORDER, RECURRENT SEVERE WITHOUT PSYCHOTIC FEATURES (HCC): Primary | ICD-10-CM

## 2024-10-15 PROCEDURE — NC001 PR NO CHARGE

## 2024-10-18 NOTE — PSYCH
MEDICATION MANAGEMENT NOTE        [facility] - [facility]      Name and Date of Birth:  Kimberly Montanez 64 y.o. 1960 MRN: [id number]    Insurance: Payor: [employer] / Plan: [employer] / Product Type: [facility] [facility] /     Date of Visit: October 15, 2024    Reason for Visit:   Chief Complaint   Patient presents with    Medication Management     Increase to Effexor XR 75mg QAM for residual anxiety symptoms. No SI, HI, AVH, nor decompensation in completion of ADLs. Appears to be improving with depressive symptoms. Continue weekly psychotherapy. Follow up in 4 weeks at office in [place].   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   Follow up in 4 weeks in [place] office.  Aware of 24 hour and weekend coverage for urgent situations accessed by calling [facility] main practice number  I am scheduling this patient out for greater than 3 months: No    Medications Risks/Benefits:      Risks, Benefits And Possible Side Effects Of Medications:    Risks, benefits, and possible side effects of medications explained to Kimberly TERRAZAS and she verbalizes understanding and agreement for treatment.    Controlled Medication Discussion:     Not applicable    SUBJECTIVE:    Kimberly TERRAZAS is seen today for a follow up for Major Depressive Disorder.     Kimberly TERRAZAS endorses feeling better than she has since her last visit. Notes that she doesn't want to return to bed shortly after waking up anymore, notes more energy and concentration, and experiences less feelings of intense of guilt and hopelessness. Endorses feeling lighter than she has in the past since starting Effexor XR 37.5mg QAM (notes not having transitioned to 75mg QAM yet), and affect appears brighter than it  was in the last session. Discusses conflict with daughter and patient over taking care of granddaughter Sydnie, and patient endorses discussing this with her therapist. Writer encourages patient to transition into role as grandmother as opposed to mother with Sydnie, and patient is receptive.      She denies suicidal ideation, intent or plan at present; denies homicidal ideation, intent or plan at present.    She denies auditory hallucinations, denies visual hallucinations, has no overt delusions.    She denies any side effects from medications.    HPI ROS Appetite Changes and Sleep:     She reports normal sleep, normal appetite, normal energy level    Current Rating Scores:     None completed today.    Review Of Systems:      Constitutional negative   ENT negative   Cardiovascular negative   Respiratory negative   Gastrointestinal negative   Genitourinary negative   Musculoskeletal negative   Integumentary negative   Neurological negative   Endocrine negative   Other Symptoms none, all other systems are negative       Past Psychiatric History: (unchanged information from previous note copied and updated)    Previous inpatient psychiatric admissions: denies.  Previous inpatient/outpatient substance abuse rehabilitation: denies.  Present/previous outpatient psychiatric treatment: denies.  Present/previous psychotherapy: In therapy.  History of suicidal attempts/gestures: Denies.  History of violence/aggressive behaviors: denies.  Present/previous psychotropic medication use: Celexa 40mg QD.    Traumatic History: (unchanged information from previous note copied and updated)    Abuse:emotional and verbal  Other Traumatic Events:  defer    Past Medical History:    Past Medical History:   Diagnosis Date    Anxiety     Depression 2013    Heart murmur     Obesity 1979    S/p partial hysterectomy with remaining cervical stump 05/03/2018        Past Surgical History:   Procedure Laterality Date    TOTAL ABDOMINAL  HYSTERECTOMY      TUBAL LIGATION      WISDOM TOOTH EXTRACTION       No Known Allergies    Current Outpatient Medications:    Current Outpatient Medications   Medication Sig Dispense Refill    aspirin (ECOTRIN LOW STRENGTH) 81 mg EC tablet Take 81 mg by mouth      citalopram (CeleXA) 40 mg tablet Take 1 tablet (40 mg total) by mouth daily 90 tablet 3    ergocalciferol (VITAMIN D2) 50,000 units Take 1 capsule (50,000 Units total) by mouth once a week 12 capsule 5    levothyroxine 75 mcg tablet Take 1 tablet (75 mcg total) by mouth daily 90 tablet 3    meclizine (ANTIVERT) 25 mg tablet Take 1 tablet (25 mg total) by mouth 3 (three) times a day as needed for dizziness for up to 15 days (Patient not taking: Reported on 4/17/2024) 45 tablet 0    Multiple Vitamins-Minerals (MULTIVITAMIN ADULTS 50+) TABS Take by mouth      rosuvastatin (CRESTOR) 10 MG tablet Take 1 tablet (10 mg total) by mouth daily 90 tablet 3    venlafaxine (EFFEXOR-XR) 37.5 mg 24 hr capsule Take 2 capsules (75 mg total) by mouth daily For first 5 days, take 1 capsule (37.5mg) by mouth daily 60 capsule 1     No current facility-administered medications for this visit.       Substance Abuse History:    Patient denies history of alcohol, illict substance, or tobacco abuse.     Kimberly TERRAZAS does not apear under the influence or withdrawal of any psychoactive substance throughout today's examination.        Social History:    Educational History:  Academic history: high school diploma/GED  Marital history:   Social support system: parents, extended family, and friend(s)  Residential history: Resides in home; lives by herself  Vocational History: works as healthcare aid  Access to guns/weapons: none  Legal History: denies    Social History     Socioeconomic History    Marital status: Single     Spouse name: Not on file    Number of children: Not on file    Years of education: Not on file    Highest education level: Not on file   Occupational History     Not on file   Tobacco Use    Smoking status: Never    Smokeless tobacco: Never   Vaping Use    Vaping status: Never Used   Substance and Sexual Activity    Alcohol use: No    Drug use: No    Sexual activity: Not Currently     Partners: Male   Other Topics Concern    Not on file   Social History Narrative    Not on file     Social Determinants of Health     Financial Resource Strain: Not on file   Food Insecurity: Not on file   Transportation Needs: Not on file   Physical Activity: Not on file   Stress: Not on file   Social Connections: Not on file   Intimate Partner Violence: Not on file   Housing Stability: Not on file       Family Psychiatric History:     Suicide attempts in all three of her children; no hx of bipolar or schizophrenia in family     Family History   Problem Relation Age of Onset    No Known Problems Mother     Hypertension Father     Dementia Father     Depression Daughter     Depression Daughter     ADD / ADHD Son     Depression Son     No Known Problems Paternal Aunt     Breast cancer Cousin        History Review: The following portions of the patient's history were reviewed and updated as appropriate: allergies, current medications, past family history, past medical history, past social history, past surgical history, and problem list.       OBJECTIVE:     Vital signs in last 24 hours:    There were no vitals filed for this visit.    Mental Status Evaluation:    Appearance age appropriate, casually dressed   Behavior cooperative, mildly anxious   Speech normal rate, normal volume, normal pitch   Mood dysphoric, anxious   Affect normal range and intensity, appropriate   Thought Processes organized, goal directed   Associations intact associations   Thought Content no overt delusions   Perceptual Disturbances: no auditory hallucinations, no visual hallucinations   Abnormal Thoughts  Risk Potential Suicidal ideation - None  Homicidal ideation - None  Potential for aggression - No   Orientation  oriented to person, place, time/date, and situation   Memory recent and remote memory grossly intact   Consciousness alert and awake   Attention Span Concentration Span attention span and concentration are age appropriate   Intellect appears to be of average intelligence   Insight intact   Judgement intact   Muscle Strength and  Gait normal muscle strength and normal muscle tone, normal gait and normal balance   Motor activity no abnormal movements   Language no difficulty naming common objects, no difficulty repeating a phrase, no difficulty writing a sentence   Fund of Knowledge adequate knowledge of current events  adequate fund of knowledge regarding past history  adequate fund of knowledge regarding vocabulary    Pain none   Pain Scale 0       Laboratory Results: I have personally reviewed all pertinent laboratory/tests results    Recent Labs (last 4 months):   Appointment on 08/14/2024   Component Date Value    WBC 08/14/2024 5.73     RBC 08/14/2024 5.42 (H)     Hemoglobin 08/14/2024 15.7 (H)     Hematocrit 08/14/2024 49.0 (H)     MCV 08/14/2024 90     MCH 08/14/2024 29.0     MCHC 08/14/2024 32.0     RDW 08/14/2024 13.1     Platelets 08/14/2024 198     MPV 08/14/2024 9.5     Sodium 08/14/2024 141     Potassium 08/14/2024 4.2     Chloride 08/14/2024 104     CO2 08/14/2024 28     ANION GAP 08/14/2024 9     BUN 08/14/2024 13     Creatinine 08/14/2024 1.10     Glucose, Fasting 08/14/2024 93     Calcium 08/14/2024 9.1     AST 08/14/2024 17     ALT 08/14/2024 12     Alkaline Phosphatase 08/14/2024 79     Total Protein 08/14/2024 6.4     Albumin 08/14/2024 3.8     Total Bilirubin 08/14/2024 0.64     eGFR 08/14/2024 53     Cholesterol 08/14/2024 216 (H)     Triglycerides 08/14/2024 115     HDL, Direct 08/14/2024 54     LDL Calculated 08/14/2024 139 (H)     Non-HDL-Chol (CHOL-HDL) 08/14/2024 162     Vit D, 25-Hydroxy 08/14/2024 47.5     Insulin, Fasting 08/14/2024 7.19     TSH 3RD GENERATON 08/14/2024 3.610      Hemoglobin A1C 08/14/2024 5.1     EAG 08/14/2024 100        Suicide/Homicide Risk Assessment:    The following interventions are recommended: no intervention changes needed. Although patient's acute lethality risk is low, long-term/chronic lethality risk is mildly elevated in the presence of MDD. At the current moment, Kimberly TERRAZAS is future-oriented, forward-thinking, and demonstrates ability to act in a self-preserving manner as evidenced by volitionally presenting to the clinic today, seeking treatment.To mitigate future risk, patient should adhere to the recommendations of this writer, avoid alcohol/illicit substance use, utilize community-based resources and familiar support and prioritize mental health treatment.     Presently, patient denies suicidal/homicidal ideation in addition to thoughts of self-injury; contracts for safety, see below for risk assessment. At conclusion of evaluation, patient is amenable to the recommendations of this writer including: medication management.  Also, patient is amenable to calling/contacting the outpatient office including this writer if any acute adverse effects of their medication regimen arise in addition to any comments or concerns pertaining to their psychiatric management.  Patient is amenable to calling/contacting crisis and/or attending to the nearest emergency department if their clinical condition deteriorates to assure their safety and stability, stating that they are able to appropriately confide in their psychiatrist regarding their psychiatric state.     At this juncture, inpatient hospitalization is not currently warranted. The following interventions are recommended:   no intervention changes    To mitigate future risk, patient should adhere to the recommendations of this writer, avoid alcohol/illicit substance use, utilize community-based resources and familiar support and prioritize mental health treatment.     Psychotherapy Provided:     Individual  psychotherapy provided: Yes       Note Share:    This note was not shared with the patient due to reasonable likelihood of causing patient harm    Visit Time    Visit Start Time: 3:00 PM  Visit Stop Time: 3:30 PM  Total Visit Duration:  30 minutes    Alex Espino MD 10/15/24

## 2024-10-23 ENCOUNTER — TELEPHONE (OUTPATIENT)
Age: 64
End: 2024-10-23

## 2024-10-23 ENCOUNTER — TELEPHONE (OUTPATIENT)
Dept: PSYCHIATRY | Facility: CLINIC | Age: 64
End: 2024-10-23

## 2024-10-23 NOTE — TELEPHONE ENCOUNTER
Called Kimberly back and instructed her as per provider to stop Effexor and go to the ER if she has any difficulty with breathing or any swelling in her face or neck.  Also informed her that it is ok for her to take Benadryl.  She will keep scheduled appointment on 11/5 and will contact the office if she has any further questions.

## 2024-10-23 NOTE — TELEPHONE ENCOUNTER
Kimberly called complaining of hives that are crawling up her arms.  States she is very itchy and uncomfortable.  She has had hives on and off since starting Effexor but today the hives that have erupted are large and very itchy.  She has them on her arms, legs, feet, and stomach. They are not on her face and she has no other symptoms.  No complaints of SOB.  She has not made any other changes to her diet, soaps, detergents or lotions, stating that Effexor is the only thing that has changed.  The hives started within days of starting the Effexor but the itching has only started recently.  She is requesting provider recommendation.  She is also asking if she can take Benadryl to help with currently symptoms.    Will refer to Dr. Espino for review.

## 2024-10-23 NOTE — TELEPHONE ENCOUNTER
Patient called in regard to medication reaction and getting hives. Writer transferred call to nurse for assistance.

## 2024-10-26 ENCOUNTER — DOCUMENTATION (OUTPATIENT)
Dept: BEHAVIORAL HEALTH UNIT | Facility: HOSPITAL | Age: 64
End: 2024-10-26

## 2024-10-26 ENCOUNTER — TELEPHONE (OUTPATIENT)
Dept: OTHER | Facility: OTHER | Age: 64
End: 2024-10-26

## 2024-10-26 DIAGNOSIS — F41.9 ANXIETY: Primary | ICD-10-CM

## 2024-10-26 RX ORDER — HYDROXYZINE HYDROCHLORIDE 25 MG/1
25 TABLET, FILM COATED ORAL 2 TIMES DAILY PRN
Qty: 14 TABLET | Refills: 0 | Status: SHIPPED | OUTPATIENT
Start: 2024-10-26

## 2024-10-26 NOTE — PSYCH
Patient called on call.  Complaining of increased tearfulness, overwhelming, feeling more emotional since discontinuing Effexor suddenly due to rash.  Ordered as needed hydroxyzine for rash and/or anxiety.  She will follow-up in the office next week.

## 2024-10-26 NOTE — TELEPHONE ENCOUNTER
Pt reached the answering service, she requested to speak to haseeb. She states she had to stop effexor last week due to hives but is now very emotional and crying. Sent ESC to [facility].

## 2024-11-05 ENCOUNTER — OFFICE VISIT (OUTPATIENT)
Age: 64
End: 2024-11-05

## 2024-11-05 DIAGNOSIS — F33.2 MAJOR DEPRESSIVE DISORDER, RECURRENT SEVERE WITHOUT PSYCHOTIC FEATURES (HCC): Primary | ICD-10-CM

## 2024-11-05 PROCEDURE — PBNCHG PB NO CHARGE PLACEHOLDER

## 2024-11-05 RX ORDER — BUPROPION HYDROCHLORIDE 150 MG/1
150 TABLET ORAL DAILY
Qty: 30 TABLET | Refills: 2 | Status: SHIPPED | OUTPATIENT
Start: 2024-11-05 | End: 2025-02-03

## 2024-11-06 NOTE — PSYCH
(Resident clinic patient with indirect supervision, no charge)    MEDICATION MANAGEMENT NOTE        [facility]      Name and Date of Birth:  Kimberly Montanez 64 y.o. 1960 MRN: [id number]    Insurance: Payor: [facility] [facility] / Plan: [employer] [facility] / Product Type: [facility] /     Date of Visit: November 5, 2024    Reason for Visit:   Chief Complaint   Patient presents with    Medication Management     Discontinue Effexor XR (which patient recontinued against medical advice) and start Wellbutrin XL 150mg QAM. Hives are present when patient is taking Effexor and not present otherwise. Depressive symptoms moderate to severe, but patient denying SI. No indication currently for inpatient psychiatric care. Will follow up in 3 weeks.       Assessment & Plan  Major depressive disorder, recurrent severe without psychotic features (HCC)    Orders:    buPROPion (Wellbutrin XL) 150 mg 24 hr tablet; Take 1 tablet (150 mg total) by mouth daily         Treatment Recommendations/Precautions:    Initiate Wellbutrin XL 150mg QAM and continue outpatient psychotherapy   Aware of 24 hour and weekend coverage for urgent situations accessed by calling [facility] main practice number  I am scheduling this patient out for greater than 3 months: No    Medications Risks/Benefits:      Risks, Benefits And Possible Side Effects Of Medications:    Risks, benefits, and possible side effects of medications explained to Kimberly TERRAZAS and she verbalizes understanding and agreement for treatment.    Controlled Medication Discussion:     Not applicable    SUBJECTIVE:    Kimberly TERRAZAS is seen today for a follow up for Major Depressive Disorder.     Discussed stressors regarding her father being in a nursing home and having an episode of sun-downing recently, not being able to see grand-daughter Sydnie recently due to her father wanting to see her  the previous few weeks, concern that brother may develop Alzheimer's due to poor memory, and uncertainty regarding thanksgiving plans. She states that, due to this anxiety, she restarted Effexor XR to help manage stress even though she has developed a pruritic maculopapular rash on her upper extremities without difficulty any other abnormal physical symptoms.  She is agreeable to initiate Wellbutrin XL and discontinue Effexor XR. Follow up in 3 weeks.      She denies suicidal ideation, intent or plan at present; denies homicidal ideation, intent or plan at present.    She denies auditory hallucinations, denies visual hallucinations, has no overt delusions.    She denies any side effects from medications.    HPI ROS Appetite Changes and Sleep:     She reports normal sleep, normal appetite, decreased energy    Current Rating Scores:     None completed today.    Review Of Systems:      Constitutional negative   ENT negative   Cardiovascular negative   Respiratory negative   Gastrointestinal negative   Genitourinary negative   Musculoskeletal negative   Integumentary negative   Neurological negative   Endocrine negative   Other Symptoms none, all other systems are negative       Past Psychiatric History: (unchanged information from previous note copied and updated)    Previous inpatient psychiatric admissions: denies.  Previous inpatient/outpatient substance abuse rehabilitation: denies.  Present/previous outpatient psychiatric treatment: denies.  Present/previous psychotherapy: In therapy.  History of suicidal attempts/gestures: Denies.  History of violence/aggressive behaviors: denies.  Present/previous psychotropic medication use: Celexa 40mg QD.    Traumatic History: (unchanged information from previous note copied and updated)    Abuse:emotional and verbal  Other Traumatic Events:  defer       Past Medical History:    Past Medical History:   Diagnosis Date    Anxiety     Depression 2013    Heart murmur     Obesity  1979    S/p partial hysterectomy with remaining cervical stump 05/03/2018        Past Surgical History:   Procedure Laterality Date    TOTAL ABDOMINAL HYSTERECTOMY      TUBAL LIGATION      WISDOM TOOTH EXTRACTION       Allergies   Allergen Reactions    Effexor [Venlafaxine] Hives     Patient developed hives when taking this medication. Improved with stopping medication and returned when patient retried medication on her own.        Current Outpatient Medications:    Current Outpatient Medications   Medication Sig Dispense Refill    buPROPion (Wellbutrin XL) 150 mg 24 hr tablet Take 1 tablet (150 mg total) by mouth daily 30 tablet 2    aspirin (ECOTRIN LOW STRENGTH) 81 mg EC tablet Take 81 mg by mouth      citalopram (CeleXA) 40 mg tablet Take 1 tablet (40 mg total) by mouth daily 90 tablet 3    ergocalciferol (VITAMIN D2) 50,000 units Take 1 capsule (50,000 Units total) by mouth once a week 12 capsule 5    hydrOXYzine HCL (ATARAX) 25 mg tablet Take 1 tablet (25 mg total) by mouth 2 (two) times a day as needed for itching or anxiety 14 tablet 0    levothyroxine 75 mcg tablet Take 1 tablet (75 mcg total) by mouth daily 90 tablet 3    meclizine (ANTIVERT) 25 mg tablet Take 1 tablet (25 mg total) by mouth 3 (three) times a day as needed for dizziness for up to 15 days (Patient not taking: Reported on 4/17/2024) 45 tablet 0    Multiple Vitamins-Minerals (MULTIVITAMIN ADULTS 50+) TABS Take by mouth      rosuvastatin (CRESTOR) 10 MG tablet Take 1 tablet (10 mg total) by mouth daily 90 tablet 3     No current facility-administered medications for this visit.       Substance Abuse History:    Patient denies history of alcohol, illict substance, or tobacco abuse.     Kimberly TERRAZAS does not apear under the influence or withdrawal of any psychoactive substance throughout today's examination.     Social History:    Educational History:  Academic history: high school diploma/GED  Marital history:   Social support system:  parents, extended family, and friend(s)  Residential history: Resides in home; lives by herself  Vocational History: works as healthcare aid  Access to guns/weapons: none  Legal History: denies    Social History     Socioeconomic History    Marital status: Single     Spouse name: Not on file    Number of children: Not on file    Years of education: Not on file    Highest education level: Not on file   Occupational History    Not on file   Tobacco Use    Smoking status: Never    Smokeless tobacco: Never   Vaping Use    Vaping status: Never Used   Substance and Sexual Activity    Alcohol use: No    Drug use: No    Sexual activity: Not Currently     Partners: Male   Other Topics Concern    Not on file   Social History Narrative    Not on file     Social Determinants of Health     Financial Resource Strain: Not on file   Food Insecurity: Not on file   Transportation Needs: Not on file   Physical Activity: Not on file   Stress: Not on file   Social Connections: Not on file   Intimate Partner Violence: Not on file   Housing Stability: Not on file       Family Psychiatric History:     Suicide attempts in all three of her children; no hx of bipolar or schizophrenia in family     Family History   Problem Relation Age of Onset    No Known Problems Mother     Hypertension Father     Dementia Father     Depression Daughter     Depression Daughter     ADD / ADHD Son     Depression Son     No Known Problems Paternal Aunt     Breast cancer Cousin        History Review: The following portions of the patient's history were reviewed and updated as appropriate: allergies, current medications, past family history, past medical history, past social history, past surgical history, and problem list.       OBJECTIVE:     Vital signs in last 24 hours:    There were no vitals filed for this visit.    Mental Status Evaluation:    Appearance age appropriate, casually dressed   Behavior cooperative, mildly anxious   Speech normal rate, normal  volume, normal pitch   Mood dysphoric, anxious   Affect normal range and intensity, appropriate   Thought Processes organized, goal directed   Associations intact associations   Thought Content no overt delusions   Perceptual Disturbances: no auditory hallucinations, no visual hallucinations   Abnormal Thoughts  Risk Potential Suicidal ideation - None  Homicidal ideation - None  Potential for aggression - No   Orientation oriented to person, place, time/date, and situation   Memory recent and remote memory grossly intact   Consciousness alert and awake   Attention Span Concentration Span attention span and concentration are age appropriate   Intellect appears to be of average intelligence   Insight intact   Judgement intact   Muscle Strength and  Gait normal muscle strength and normal muscle tone, normal gait and normal balance   Motor activity no abnormal movements   Language no difficulty naming common objects, no difficulty repeating a phrase, no difficulty writing a sentence   Fund of Knowledge adequate knowledge of current events  adequate fund of knowledge regarding past history  adequate fund of knowledge regarding vocabulary    Pain none   Pain Scale 0       Laboratory Results: I have personally reviewed all pertinent laboratory/tests results    Recent Labs (last 2 months):   No visits with results within 2 Month(s) from this visit.   Latest known visit with results is:   Appointment on 08/14/2024   Component Date Value    WBC 08/14/2024 5.73     RBC 08/14/2024 5.42 (H)     Hemoglobin 08/14/2024 15.7 (H)     Hematocrit 08/14/2024 49.0 (H)     MCV 08/14/2024 90     MCH 08/14/2024 29.0     MCHC 08/14/2024 32.0     RDW 08/14/2024 13.1     Platelets 08/14/2024 198     MPV 08/14/2024 9.5     Sodium 08/14/2024 141     Potassium 08/14/2024 4.2     Chloride 08/14/2024 104     CO2 08/14/2024 28     ANION GAP 08/14/2024 9     BUN 08/14/2024 13     Creatinine 08/14/2024 1.10     Glucose, Fasting 08/14/2024 93      Calcium 08/14/2024 9.1     AST 08/14/2024 17     ALT 08/14/2024 12     Alkaline Phosphatase 08/14/2024 79     Total Protein 08/14/2024 6.4     Albumin 08/14/2024 3.8     Total Bilirubin 08/14/2024 0.64     eGFR 08/14/2024 53     Cholesterol 08/14/2024 216 (H)     Triglycerides 08/14/2024 115     HDL, Direct 08/14/2024 54     LDL Calculated 08/14/2024 139 (H)     Non-HDL-Chol (CHOL-HDL) 08/14/2024 162     Vit D, 25-Hydroxy 08/14/2024 47.5     Insulin, Fasting 08/14/2024 7.19     TSH 3RD GENERATON 08/14/2024 3.610     Hemoglobin A1C 08/14/2024 5.1     EAG 08/14/2024 100        Suicide/Homicide Risk Assessment:    The following interventions are recommended: no intervention changes needed. Although patient's acute lethality risk is low, long-term/chronic lethality risk is mildly elevated in the presence of MDD. At the current moment, Kimberly TERRAZAS is future-oriented, forward-thinking, and demonstrates ability to act in a self-preserving manner as evidenced by volitionally presenting to the clinic today, seeking treatment.To mitigate future risk, patient should adhere to the recommendations of this writer, avoid alcohol/illicit substance use, utilize community-based resources and familiar support and prioritize mental health treatment.     Presently, patient denies suicidal/homicidal ideation in addition to thoughts of self-injury; contracts for safety, see below for risk assessment. At conclusion of evaluation, patient is amenable to the recommendations of this writer including: medication management and outpatient psychotherapy.  Also, patient is amenable to calling/contacting the outpatient office including this writer if any acute adverse effects of their medication regimen arise in addition to any comments or concerns pertaining to their psychiatric management.  Patient is amenable to calling/contacting crisis and/or attending to the nearest emergency department if their clinical condition deteriorates to assure their  safety and stability, stating that they are able to appropriately confide in their psychiatrist and therapist regarding their psychiatric state.     At this juncture, inpatient hospitalization is not currently warranted. The following interventions are recommended:   no intervention changes    To mitigate future risk, patient should adhere to the recommendations of this writer, avoid alcohol/illicit substance use, utilize community-based resources and familiar support and prioritize mental health treatment.     Psychotherapy Provided:     Individual psychotherapy provided: Yes       Note Share:    This note was not shared with the patient due to reasonable likelihood of causing patient harm    Visit Time    Visit Start Time: 2:30 PM  Visit Stop Time: 3:00 PM  Total Visit Duration:  30 minutes    Alex Epsino MD 11/05/24

## 2024-11-12 ENCOUNTER — APPOINTMENT (OUTPATIENT)
Dept: RADIOLOGY | Facility: MEDICAL CENTER | Age: 64
End: 2024-11-12
Payer: COMMERCIAL

## 2024-11-12 ENCOUNTER — OFFICE VISIT (OUTPATIENT)
Dept: FAMILY MEDICINE CLINIC | Facility: CLINIC | Age: 64
End: 2024-11-12
Payer: COMMERCIAL

## 2024-11-12 VITALS
SYSTOLIC BLOOD PRESSURE: 138 MMHG | HEIGHT: 65 IN | OXYGEN SATURATION: 99 % | BODY MASS INDEX: 48.82 KG/M2 | WEIGHT: 293 LBS | DIASTOLIC BLOOD PRESSURE: 78 MMHG | TEMPERATURE: 97.8 F | RESPIRATION RATE: 18 BRPM | HEART RATE: 89 BPM

## 2024-11-12 DIAGNOSIS — M25.512 BILATERAL SHOULDER PAIN, UNSPECIFIED CHRONICITY: Primary | ICD-10-CM

## 2024-11-12 DIAGNOSIS — R42 VERTIGO: ICD-10-CM

## 2024-11-12 DIAGNOSIS — E03.9 HYPOTHYROIDISM, UNSPECIFIED TYPE: ICD-10-CM

## 2024-11-12 DIAGNOSIS — E78.5 HYPERLIPIDEMIA, UNSPECIFIED HYPERLIPIDEMIA TYPE: ICD-10-CM

## 2024-11-12 DIAGNOSIS — M25.562 PAIN IN BOTH KNEES, UNSPECIFIED CHRONICITY: ICD-10-CM

## 2024-11-12 DIAGNOSIS — M25.511 BILATERAL SHOULDER PAIN, UNSPECIFIED CHRONICITY: Primary | ICD-10-CM

## 2024-11-12 DIAGNOSIS — M25.561 PAIN IN BOTH KNEES, UNSPECIFIED CHRONICITY: ICD-10-CM

## 2024-11-12 PROCEDURE — 99214 OFFICE O/P EST MOD 30 MIN: CPT | Performed by: INTERNAL MEDICINE

## 2024-11-12 PROCEDURE — 73562 X-RAY EXAM OF KNEE 3: CPT

## 2024-11-12 RX ORDER — MECLIZINE HYDROCHLORIDE 25 MG/1
25 TABLET ORAL 3 TIMES DAILY PRN
Qty: 45 TABLET | Refills: 0 | Status: SHIPPED | OUTPATIENT
Start: 2024-11-12 | End: 2024-11-27

## 2024-11-12 NOTE — ASSESSMENT & PLAN NOTE
Orders:    LDL cholesterol, direct; Future  Recheck levels in next visit and follow with wt mgmt

## 2024-11-12 NOTE — PROGRESS NOTES
Ambulatory Visit  Name: Kimberly Montanez      : 1960      MRN: [id number]  Encounter Provider: Batsheva Gomes DO  Encounter Date: 2024   Encounter department: [facility]    Assessment & Plan  Vertigo    Orders:    meclizine (ANTIVERT) 25 mg tablet; Take 1 tablet (25 mg total) by mouth 3 (three) times a day as needed for dizziness for up to 15 days    Bilateral shoulder pain, unspecified chronicity    Orders:    Ambulatory Referral to Physical Therapy; Future  Start PT and discussed Wellness following   Pain in both knees, unspecified chronicity    Orders:    XR knee 3 vw right non injury; Future    XR knee 3 vw left non injury; Future  She will get xrays today and discussed possible ortho eval   Hyperlipidemia, unspecified hyperlipidemia type    Orders:    LDL cholesterol, direct; Future  Recheck levels in next visit and follow with wt mgmt   Hypothyroidism, unspecified type    Orders:    TSH, 3rd generation; Future       History of Present Illness     HPI  Pt doing ok bilateral shoulder pain has been challenging and limiting She is frustrated that she has not lost weight as she has made some changes She has ongoing knee pain She is working with therapist and psych to find medication that she tolerates - had rxn to effexor She does see her granddaughter and is now dealing with there Dad who just went on hospice and is in NH     Review of Systems   Constitutional:  Negative for chills and fever.   HENT: Negative.     Respiratory: Negative.     Cardiovascular: Negative.    Gastrointestinal: Negative.    Genitourinary: Negative.    Musculoskeletal:  Positive for arthralgias and myalgias.   Neurological:  Negative for dizziness, light-headedness and headaches.   Psychiatric/Behavioral:  Negative for sleep disturbance. The patient is not nervous/anxious.      Past Medical History:   Diagnosis Date    Anxiety     Depression     Heart murmur     Obesity     S/p partial hysterectomy with  "remaining cervical stump 05/03/2018     Past Surgical History:   Procedure Laterality Date    TOTAL ABDOMINAL HYSTERECTOMY      TUBAL LIGATION      WISDOM TOOTH EXTRACTION       Social History     Socioeconomic History    Marital status: Single     Spouse name: Not on file    Number of children: Not on file    Years of education: Not on file    Highest education level: Not on file   Occupational History    Not on file   Tobacco Use    Smoking status: Never    Smokeless tobacco: Never   Vaping Use    Vaping status: Never Used   Substance and Sexual Activity    Alcohol use: No    Drug use: No    Sexual activity: Not Currently     Partners: Male   Other Topics Concern    Not on file   Social History Narrative    Not on file     Social Determinants of Health     Financial Resource Strain: Not on file   Food Insecurity: Not on file   Transportation Needs: Not on file   Physical Activity: Not on file   Stress: Not on file   Social Connections: Not on file   Intimate Partner Violence: Not on file   Housing Stability: Not on file     Allergies   Allergen Reactions    Effexor [Venlafaxine] Hives     Patient developed hives when taking this medication. Improved with stopping medication and returned when patient retried medication on her own.            Objective     /78   Pulse 89   Temp 97.8 °F (36.6 °C) (Temporal)   Resp 18   Ht 5' 5\" (1.651 m)   Wt (!) 158 kg (349 lb)   SpO2 99%   BMI 58.08 kg/m²     Physical Exam  Vitals and nursing note reviewed.   Constitutional:       General: She is not in acute distress.     Appearance: Normal appearance. She is not ill-appearing, toxic-appearing or diaphoretic.   HENT:      Head: Normocephalic and atraumatic.      Right Ear: External ear normal.      Left Ear: External ear normal.      Nose: Nose normal.      Mouth/Throat:      Mouth: Mucous membranes are moist.   Eyes:      General: No scleral icterus.  Cardiovascular:      Rate and Rhythm: Normal rate and regular " rhythm.      Pulses: Normal pulses.      Heart sounds: Normal heart sounds.   Pulmonary:      Effort: Pulmonary effort is normal. No respiratory distress.      Breath sounds: Normal breath sounds. No wheezing.   Abdominal:      General: Bowel sounds are normal. There is no distension.      Palpations: Abdomen is soft.      Tenderness: There is no abdominal tenderness.   Musculoskeletal:         General: Tenderness present.      Cervical back: Normal range of motion and neck supple.   Lymphadenopathy:      Cervical: No cervical adenopathy.   Skin:     General: Skin is warm and dry.   Neurological:      General: No focal deficit present.      Mental Status: She is alert and oriented to person, place, and time. Mental status is at baseline.      Cranial Nerves: No cranial nerve deficit.      Sensory: Sensory deficit present.      Gait: Gait abnormal.   Psychiatric:         Mood and Affect: Mood normal.         Behavior: Behavior normal.         Thought Content: Thought content normal.         Judgment: Judgment normal.

## 2024-11-12 NOTE — ASSESSMENT & PLAN NOTE
Orders:    meclizine (ANTIVERT) 25 mg tablet; Take 1 tablet (25 mg total) by mouth 3 (three) times a day as needed for dizziness for up to 15 days

## 2024-11-26 ENCOUNTER — OFFICE VISIT (OUTPATIENT)
Age: 64
End: 2024-11-26

## 2024-11-26 DIAGNOSIS — F41.1 GAD (GENERALIZED ANXIETY DISORDER): ICD-10-CM

## 2024-11-26 DIAGNOSIS — F33.2 SEVERE EPISODE OF RECURRENT MAJOR DEPRESSIVE DISORDER, WITHOUT PSYCHOTIC FEATURES (HCC): Primary | ICD-10-CM

## 2024-11-26 RX ORDER — DULOXETIN HYDROCHLORIDE 20 MG/1
20 CAPSULE, DELAYED RELEASE ORAL 2 TIMES DAILY
Qty: 60 CAPSULE | Refills: 2 | Status: SHIPPED | OUTPATIENT
Start: 2024-11-26 | End: 2025-02-24

## 2024-11-26 RX ORDER — DULOXETIN HYDROCHLORIDE 20 MG/1
20 CAPSULE, DELAYED RELEASE ORAL 2 TIMES DAILY
Qty: 60 CAPSULE | Refills: 2 | Status: SHIPPED | OUTPATIENT
Start: 2024-11-26 | End: 2024-11-26

## 2024-11-26 NOTE — ASSESSMENT & PLAN NOTE
Celexa 40mg once per day   PARQ completed including serotonin syndrome, SIADH, worsening depression, suicidality, induction of nakul, GI upset, headaches, activation, sexual side effects, sedation, potential drug interactions, and others.   Cymbalta 20mg DR twice per day  PARQ completed including serotonin syndrome, SIADH, worsened depression/suicidality, induction of nakul, blood pressure changes and GI distress, weight gain, sexual side effects, insomnia, sedation, potential for drug interactions, and others.

## 2024-11-26 NOTE — PSYCH
(Resident Outpatient Clinic with Indirect Supervision--No Charge)  MEDICATION MANAGEMENT NOTE        [facility]      Name and Date of Birth:  Kimberly Montanez 64 y.o. 1960 MRN: [id number]    Insurance: Payor: [employer] [facility] / Plan: [employer] / Product Type: [facility] /     Date of Visit: November 26, 2024    Reason for Visit:   Chief Complaint   Patient presents with    Medication Management       Wellbutrin was poorly tolerated and cause worsening anxiety, irritability. Discontinue Wellbutrin XL 150mg QAM. Depressive symptoms appear worsening, and affect is notably more tearful. Will schedule patient for follow up in the next 1-2 weeks at [place], and will prescribe Cymbalta 20mg DR once per day (patient instructed to start medication two days after stopping Wellbutrin) with plan to increase to twice per day if tolerating augmentation. Passive SI , no plan, transient, denies access to lethal means such as firearms. Otherwise, no decompensation in ability to care for self that would require a higher level of care.     Assessment & Plan  Severe episode of recurrent major depressive disorder, without psychotic features (HCC)  Celexa 40mg once per day   PARQ completed including serotonin syndrome, SIADH, worsening depression, suicidality, induction of nakul, GI upset, headaches, activation, sexual side effects, sedation, potential drug interactions, and others.   Historical medication, patient has been taking this medication since 2018 for depression and anxiety symptoms  Cymbalta 20mg DR once per day  PARQ completed including serotonin syndrome, SIADH, worsened depression/suicidality, induction of nakul, blood pressure changes and GI distress, weight gain, sexual side effects, insomnia, sedation, potential for drug interactions, and others.    To start on 11/29/24 to augment Celexa 40mg  KRISTIN (generalized anxiety  disorder)  Celexa 40mg once per day   PARQ completed including serotonin syndrome, SIADH, worsening depression, suicidality, induction of nakul, GI upset, headaches, activation, sexual side effects, sedation, potential drug interactions, and others.   Cymbalta 20mg DR twice per day  PARQ completed including serotonin syndrome, SIADH, worsened depression/suicidality, induction of nakul, blood pressure changes and GI distress, weight gain, sexual side effects, insomnia, sedation, potential for drug interactions, and others.         Treatment Recommendations/Precautions:    Psychotropic Medication Regiment: Celexa 40mg QD and Cymbalta DR 20mg once per day  Psychotherapy: Continues weekly psychotherapy  Next Appointment: In one week    Aware of 24 hour and weekend coverage for urgent situations accessed by calling [facility] main practice number  I am scheduling this patient out for greater than 3 months: No    Medications Risks/Benefits:      Risks, Benefits And Possible Side Effects Of Medications:    Risks, benefits, and possible side effects of medications explained to Kimberly TERRAZAS and she verbalizes understanding and agreement for treatment.    Controlled Medication Discussion:     Not applicable    SUBJECTIVE:    Kimberly TERRAZAS is seen today for a follow up for Major Depressive Disorder and Generalized Anxiety Disorder.     Patient endorses lowering of frustration tolerance and worsening irritability since starting Wellbutrin XL 150mg. However, this is also in the context of worsening psychosocial stressors including conflict with her daughter Katrin, less frequent interaction with her granddaughter Sydnie, and her father being placed on hospice care for advanced Alzheimer's dementia. She is tearful discussing these stressors, and is also stating that she is experiencing worsening of disrupted sleep, more pronounced low energy and anhedonia, and the emergence of passive SI without plan. She denies  access to lethal means at home, but notes that these suicidal thoughts emerged after her most recent conflict with her daughter Katrin. Supportive psychotherapy provided. She is agreeable to discontinuing Wellbutrin XL, and starting a trial of Cymbalta 20mg once per day to augment Celexa 40mg QD for treatment of her depression and anxiety symptoms. She agrees to reach out to the office should her suicidal ideation worsen, and to seek out emergency care.       She endorses passive death wish recently; denies homicidal ideation, intent or plan at present.    She denies auditory hallucinations, denies visual hallucinations, has no overt delusions.    She  endorses anger, irritability, and anxiety from Wellbutrin XL.    HPI ROS Appetite Changes and Sleep:     She reports fluctuating sleep pattern, normal appetite, decreased energy    Current Rating Scores:     None completed today.    Review Of Systems:      Constitutional negative   ENT negative   Cardiovascular negative   Respiratory negative   Gastrointestinal negative   Genitourinary negative   Musculoskeletal negative   Integumentary negative   Neurological negative   Endocrine negative   Other Symptoms none, all other systems are negative       Past Psychiatric History: (unchanged information from previous note copied and updated)     Previous inpatient psychiatric admissions: denies.  Previous inpatient/outpatient substance abuse rehabilitation: denies.  Present/previous outpatient psychiatric treatment: denies.  Present/previous psychotherapy: In therapy.  History of suicidal attempts/gestures: Denies.  History of violence/aggressive behaviors: denies.  Present/previous psychotropic medication use: Celexa 40mg QD.    Traumatic History: (unchanged information from previous note copied and updated)    Abuse:emotional and verbal  Other Traumatic Events:  defer    Substance Abuse History:    Patient denies history of alcohol, illict substance, or tobacco abuse.      Kimberly TERRAZAS does not apear under the influence or withdrawal of any psychoactive substance throughout today's examination.     Social History:    Educational History:  Academic history: high school diploma/GED  Marital history:   Social support system: parents, extended family, and friend(s)  Residential history: Resides in home; lives by herself  Vocational History: works as healthcare aid  Access to guns/weapons: none  Legal History: denies         Family Psychiatric History:      Suicide attempts in all three of her children; no hx of bipolar or schizophrenia in family     Past Medical History:    Past Medical History:   Diagnosis Date    Anxiety     Depression 2013    Heart murmur     Obesity 1979    S/p partial hysterectomy with remaining cervical stump 05/03/2018        Past Surgical History:   Procedure Laterality Date    TOTAL ABDOMINAL HYSTERECTOMY      TUBAL LIGATION      WISDOM TOOTH EXTRACTION       Allergies   Allergen Reactions    Effexor [Venlafaxine] Hives     Patient developed hives when taking this medication. Improved with stopping medication and returned when patient retried medication on her own.        Current Outpatient Medications:    Current Outpatient Medications   Medication Sig Dispense Refill    DULoxetine (CYMBALTA) 20 mg capsule Take 1 capsule (20 mg total) by mouth 2 (two) times a day 60 capsule 2    aspirin (ECOTRIN LOW STRENGTH) 81 mg EC tablet Take 81 mg by mouth      citalopram (CeleXA) 40 mg tablet Take 1 tablet (40 mg total) by mouth daily (Patient not taking: Reported on 11/12/2024) 90 tablet 3    ergocalciferol (VITAMIN D2) 50,000 units Take 1 capsule (50,000 Units total) by mouth once a week 12 capsule 5    hydrOXYzine HCL (ATARAX) 25 mg tablet Take 1 tablet (25 mg total) by mouth 2 (two) times a day as needed for itching or anxiety (Patient not taking: Reported on 11/12/2024) 14 tablet 0    levothyroxine 75 mcg tablet Take 1 tablet (75 mcg total) by mouth daily 90  tablet 3    meclizine (ANTIVERT) 25 mg tablet Take 1 tablet (25 mg total) by mouth 3 (three) times a day as needed for dizziness for up to 15 days 45 tablet 0    Multiple Vitamins-Minerals (MULTIVITAMIN ADULTS 50+) TABS Take by mouth      rosuvastatin (CRESTOR) 10 MG tablet Take 1 tablet (10 mg total) by mouth daily 90 tablet 3     No current facility-administered medications for this visit.       History Review: The following portions of the patient's history were reviewed and updated as appropriate: allergies, current medications, past family history, past medical history, past social history, past surgical history, and problem list.       OBJECTIVE:     Vital signs in last 24 hours:    There were no vitals filed for this visit.    Mental Status Evaluation:    Appearance age appropriate, casually dressed   Behavior cooperative, appears anxious   Speech normal rate, normal volume, normal pitch   Mood depressed, anxious   Affect tearful, mood-congruent   Thought Processes organized, goal directed   Associations intact associations   Thought Content no overt delusions   Perceptual Disturbances: no auditory hallucinations, no visual hallucinations   Abnormal Thoughts  Risk Potential Suicidal ideation - None at present  Homicidal ideation - None  Potential for aggression - No   Orientation oriented to person, place, time/date, and situation   Memory recent and remote memory grossly intact   Consciousness alert and awake   Attention Span Concentration Span attention span and concentration are age appropriate   Intellect appears to be of average intelligence   Insight intact   Judgement intact   Muscle Strength and  Gait normal muscle strength and normal muscle tone, normal gait and normal balance   Motor activity no abnormal movements   Language no difficulty naming common objects, no difficulty repeating a phrase, no difficulty writing a sentence   Fund of Knowledge adequate knowledge of current events  adequate fund  of knowledge regarding past history  adequate fund of knowledge regarding vocabulary    Pain none   Pain Scale 0       Laboratory Results: I have personally reviewed all pertinent laboratory/tests results    Recent Labs (last 2 months):   No visits with results within 2 Month(s) from this visit.   Latest known visit with results is:   Appointment on 08/14/2024   Component Date Value    WBC 08/14/2024 5.73     RBC 08/14/2024 5.42 (H)     Hemoglobin 08/14/2024 15.7 (H)     Hematocrit 08/14/2024 49.0 (H)     MCV 08/14/2024 90     MCH 08/14/2024 29.0     MCHC 08/14/2024 32.0     RDW 08/14/2024 13.1     Platelets 08/14/2024 198     MPV 08/14/2024 9.5     Sodium 08/14/2024 141     Potassium 08/14/2024 4.2     Chloride 08/14/2024 104     CO2 08/14/2024 28     ANION GAP 08/14/2024 9     BUN 08/14/2024 13     Creatinine 08/14/2024 1.10     Glucose, Fasting 08/14/2024 93     Calcium 08/14/2024 9.1     AST 08/14/2024 17     ALT 08/14/2024 12     Alkaline Phosphatase 08/14/2024 79     Total Protein 08/14/2024 6.4     Albumin 08/14/2024 3.8     Total Bilirubin 08/14/2024 0.64     eGFR 08/14/2024 53     Cholesterol 08/14/2024 216 (H)     Triglycerides 08/14/2024 115     HDL, Direct 08/14/2024 54     LDL Calculated 08/14/2024 139 (H)     Non-HDL-Chol (CHOL-HDL) 08/14/2024 162     Vit D, 25-Hydroxy 08/14/2024 47.5     Insulin, Fasting 08/14/2024 7.19     TSH 3RD GENERATON 08/14/2024 3.610     Hemoglobin A1C 08/14/2024 5.1     EAG 08/14/2024 100        Suicide/Homicide Risk Assessment:    The following interventions are recommended: no intervention changes needed. Although patient's acute lethality risk is low, long-term/chronic lethality risk is mildly elevated in the presence of MDD, KRISTIN. At the current moment, Kimberly TERRAZAS is future-oriented, forward-thinking, and demonstrates ability to act in a self-preserving manner as evidenced by volitionally presenting to the clinic today, seeking treatment.To mitigate future risk, patient  should adhere to the recommendations of this writer, avoid alcohol/illicit substance use, utilize community-based resources and familiar support and prioritize mental health treatment.     Presently, patient denies suicidal/homicidal ideation in addition to thoughts of self-injury; contracts for safety, see below for risk assessment. At conclusion of evaluation, patient is amenable to the recommendations of this writer including: medication management and continued psychotherapy.  Also, patient is amenable to calling/contacting the outpatient office including this writer if any acute adverse effects of their medication regimen arise in addition to any comments or concerns pertaining to their psychiatric management.  Patient is amenable to calling/contacting crisis and/or attending to the nearest emergency department if their clinical condition deteriorates to assure their safety and stability, stating that they are able to appropriately confide in their psychiatrist and therapist regarding their psychiatric state.     At this juncture, inpatient hospitalization is not currently warranted. The following interventions are recommended:   no intervention changes    To mitigate future risk, patient should adhere to the recommendations of this writer, avoid alcohol/illicit substance use, utilize community-based resources and familiar support and prioritize mental health treatment.     Psychotherapy Provided:     Individual psychotherapy provided: Yes     Note Share:    This note was not shared with the patient due to reasonable likelihood of causing patient harm    Visit Time    Visit Start Time: 0810 AM  Visit Stop Time: 0900 AM  Total Visit Duration:  50 minutes    Alex Espino MD 11/26/24

## 2024-11-26 NOTE — ASSESSMENT & PLAN NOTE
Celexa 40mg once per day   PARQ completed including serotonin syndrome, SIADH, worsening depression, suicidality, induction of nakul, GI upset, headaches, activation, sexual side effects, sedation, potential drug interactions, and others.   Historical medication, patient has been taking this medication since 2018 for depression and anxiety symptoms  Cymbalta 20mg DR once per day  PARQ completed including serotonin syndrome, SIADH, worsened depression/suicidality, induction of nakul, blood pressure changes and GI distress, weight gain, sexual side effects, insomnia, sedation, potential for drug interactions, and others.    To start on 11/29/24 to augment Celexa 40mg

## 2025-01-08 ENCOUNTER — TELEPHONE (OUTPATIENT)
Age: 65
End: 2025-01-08

## 2025-01-08 NOTE — TELEPHONE ENCOUNTER
Patient called and was seeking to schedule an appt with Alex Espino. Writer was able to warm transfer her to the MR to schedule.

## 2025-01-20 ENCOUNTER — TELEPHONE (OUTPATIENT)
Age: 65
End: 2025-01-20

## 2025-01-20 NOTE — TELEPHONE ENCOUNTER
LMOM asking patient to please call office back at [phone] to reschedule tomorrow's appointment with Dr. Espino in [place]. Provider is sick and will not be in office tomorrow.     Call center: please transfer to [place]

## 2025-02-04 ENCOUNTER — TELEPHONE (OUTPATIENT)
Age: 65
End: 2025-02-04

## 2025-02-10 ENCOUNTER — APPOINTMENT (OUTPATIENT)
Dept: LAB | Facility: HOSPITAL | Age: 65
End: 2025-02-10
Payer: COMMERCIAL

## 2025-02-10 DIAGNOSIS — E78.5 HYPERLIPIDEMIA, UNSPECIFIED HYPERLIPIDEMIA TYPE: ICD-10-CM

## 2025-02-10 DIAGNOSIS — E03.9 HYPOTHYROIDISM, UNSPECIFIED TYPE: ICD-10-CM

## 2025-02-10 LAB
LDLC SERPL DIRECT ASSAY-MCNC: 95 MG/DL (ref 0–100)
TSH SERPL DL<=0.05 MIU/L-ACNC: 2.89 UIU/ML (ref 0.45–4.5)

## 2025-02-10 PROCEDURE — 84443 ASSAY THYROID STIM HORMONE: CPT

## 2025-02-10 PROCEDURE — 83721 ASSAY OF BLOOD LIPOPROTEIN: CPT

## 2025-02-10 PROCEDURE — 36415 COLL VENOUS BLD VENIPUNCTURE: CPT

## 2025-02-11 ENCOUNTER — OFFICE VISIT (OUTPATIENT)
Age: 65
End: 2025-02-11

## 2025-02-11 ENCOUNTER — TELEPHONE (OUTPATIENT)
Dept: PSYCHOLOGY | Facility: CLINIC | Age: 65
End: 2025-02-11

## 2025-02-11 ENCOUNTER — OFFICE VISIT (OUTPATIENT)
Dept: FAMILY MEDICINE CLINIC | Facility: CLINIC | Age: 65
End: 2025-02-11
Payer: COMMERCIAL

## 2025-02-11 VITALS
SYSTOLIC BLOOD PRESSURE: 126 MMHG | TEMPERATURE: 97.3 F | HEIGHT: 65 IN | DIASTOLIC BLOOD PRESSURE: 78 MMHG | HEART RATE: 86 BPM | OXYGEN SATURATION: 96 % | WEIGHT: 293 LBS | RESPIRATION RATE: 18 BRPM | BODY MASS INDEX: 48.82 KG/M2

## 2025-02-11 DIAGNOSIS — E03.9 HYPOTHYROIDISM, UNSPECIFIED TYPE: ICD-10-CM

## 2025-02-11 DIAGNOSIS — F41.1 GAD (GENERALIZED ANXIETY DISORDER): ICD-10-CM

## 2025-02-11 DIAGNOSIS — F33.2 SEVERE EPISODE OF RECURRENT MAJOR DEPRESSIVE DISORDER, WITHOUT PSYCHOTIC FEATURES (HCC): ICD-10-CM

## 2025-02-11 DIAGNOSIS — M17.0 PRIMARY OSTEOARTHRITIS OF BOTH KNEES: ICD-10-CM

## 2025-02-11 DIAGNOSIS — M25.511 BILATERAL SHOULDER PAIN, UNSPECIFIED CHRONICITY: Primary | ICD-10-CM

## 2025-02-11 DIAGNOSIS — E66.01 MORBID OBESITY WITH BMI OF 50.0-59.9, ADULT (HCC): ICD-10-CM

## 2025-02-11 DIAGNOSIS — M25.512 BILATERAL SHOULDER PAIN, UNSPECIFIED CHRONICITY: Primary | ICD-10-CM

## 2025-02-11 DIAGNOSIS — F43.21 GRIEF: ICD-10-CM

## 2025-02-11 PROCEDURE — PBNCHG PB NO CHARGE PLACEHOLDER

## 2025-02-11 PROCEDURE — 99214 OFFICE O/P EST MOD 30 MIN: CPT | Performed by: INTERNAL MEDICINE

## 2025-02-11 RX ORDER — DULOXETIN HYDROCHLORIDE 20 MG/1
20 CAPSULE, DELAYED RELEASE ORAL 2 TIMES DAILY
Qty: 60 CAPSULE | Refills: 2 | Status: SHIPPED | OUTPATIENT
Start: 2025-02-11 | End: 2025-05-12

## 2025-02-11 RX ORDER — CELECOXIB 100 MG/1
100 CAPSULE ORAL 2 TIMES DAILY
Qty: 60 CAPSULE | Refills: 0 | Status: SHIPPED | OUTPATIENT
Start: 2025-02-11

## 2025-02-11 RX ORDER — CITALOPRAM HYDROBROMIDE 40 MG/1
40 TABLET ORAL DAILY
Qty: 30 TABLET | Refills: 2 | Status: SHIPPED | OUTPATIENT
Start: 2025-02-11 | End: 2025-05-12

## 2025-02-11 NOTE — PSYCH
[place] PARTIAL PROGRAM REFERRAL     [facility] - [facility]    Name and Date of Birth:  Kimberly Montanez 64 y.o. 1960    Date of Referral: February 11, 2025    Referral Source (Agency/Name): [facility], Alex Espino MD    Correct Demographics on file:  Yes     Emergency contact on file: Yes     Insurance on file: Yes     _____________________________________________      Presenting Symptoms and Stressors:      Please describe the reason for Referral: MDD, severe; KRISTIN; patient is largely socially isolated and, while engaged in weekly therapy, would do very well with intensive group therapy for several weeks; some elements of personality pathology (masochistic/depressive) and would benefit from DBT skills; normal grief reaction superimposed on the prior.    Stressors: family conflict, child custody issues, and death of family member (father several weeks ago)    Symptoms:  depressive symptoms and anxiety    Suicidal Ideation: None at present    Homicidal Ideation: None    Depressed Mood: Yes, depressed mood, anhedonia, sadness, hopelessness, low energy, low motivation, excessive guilt, poor concentration, ruminations, irritability, increased appetite, weight gain    Radha/Hypomania: None    Psychosis: None    Agitation: No    Appetite Changes: increased appetite    Sleep Disturbance: hypersomnia    Access to Weapons:  No    Smoking Status: denies use    Substance Use:  None    Current Psychiatrist or Therapist:    Psychiatrist: Dr Alex Espino  Therapist: Out of Network Therapist    Past Psychiatric Treatment: Celexa, Wellbutrin (did not tolerate Wellbutrin, caused anxiety and worsened irritability)    If currently Inpatient - Date of Anticipated discharge: N/A    Diagnoses:  Major Depressive Disorder, single, severe without psychotic features  Generalized Anxiety Disorder    Do they Require Ambulatory Assistance:  No    Communication Assistance: not required     Legal Issues: None        Alex Espino MD       (Indirect Supervision in Resident Clinic -- No Charge)  MEDICATION MANAGEMENT NOTE        [facility] - [facility]      Name and Date of Birth:  Kimberly Montanez 64 y.o. 1960 MRN: [id number]    Insurance: Payor: [facility] [facility] [facility] / Plan: [employer] [facility] / Product Type: [facility] /     Date of Visit: February 11, 2025    Reason for Visit: Medication Management      Will refer patient to partial program to better manage her fairly severe and chronic depressive symptoms and aid patient in becoming more socially integrated. Karen is presently experiencing a normal grief reaction (her transient suicidal ideation with plan consistent with such phenomenon) and does not presently need to be admitted to an IP BHU for stabilization. She endorses compliance and good response with her regiment of Cymbalta and Celexa (though depressive symptoms presently somewhat worsened after the death of her father), and will not make changes as the patient progresses through her grief at this time. Her wish to become the primary care giver for her grand daughter Sydnie is likely causing her to have conflict with her daughter Katrin (Karen was taking care of Sydnie while Katrin was actively engaged in substance abuse, Sydnie went back to Katrin's care after she got sober, Karen very much is struggling with not seeing Sydnie frequently and harbors some resentment towards her daughter for entering recovery and taking back custody of Sydnie). Follow up in two weeks if patient not enrolled in [facility].    Assessment & Plan  Severe episode of recurrent major depressive disorder, without psychotic features (HCC)    Celexa 40mg once per day   PARQ completed including serotonin syndrome, SIADH, worsening depression, suicidality, induction of nakul, GI upset, headaches,  activation, sexual side effects, sedation, potential drug interactions, and others.   Cymbalta 20mg DR twice per day  PARQ completed including serotonin syndrome, SIADH, worsened depression/suicidality, induction of nakul, blood pressure changes and GI distress, weight gain, sexual side effects, insomnia, sedation, potential for drug interactions, and others.    KRISTIN (generalized anxiety disorder)    Celexa 40mg once per day   PARQ completed including serotonin syndrome, SIADH, worsening depression, suicidality, induction of nakul, GI upset, headaches, activation, sexual side effects, sedation, potential drug interactions, and others.   Cymbalta 20mg DR twice per day  PARQ completed including serotonin syndrome, SIADH, worsened depression/suicidality, induction of nakul, blood pressure changes and GI distress, weight gain, sexual side effects, insomnia, sedation, potential for drug interactions, and others.           Treatment Recommendations/Precautions:    Psychotropic Medication Regiment: Cymbalta 20mg DR BID, Celexa 40mg QD  Psychotherapy: In weekly psychotherapy  Next Appointment: 2 week follow up    Aware of 24 hour and weekend coverage for urgent situations accessed by calling [facility] main practice number  I am scheduling this patient out for greater than 3 months: No    Medications Risks/Benefits:      Risks, Benefits And Possible Side Effects Of Medications:    Risks, benefits, and possible side effects of medications explained to Kimberly TERRAZAS and she verbalizes understanding and agreement for treatment.    Controlled Medication Discussion:     Not applicable    SUBJECTIVE:    Kimberly TERRAZAS is seen today for a follow up for Major Depressive Disorder and Generalized Anxiety Disorder.     On examination today, patient is tearful as she describes how her father passed away last month in the context of prolonged cognitive decline. She endorses being very tearful in the week that followed and  "having one brief and isolated moment where she was considering ending her life \"to be back with my dad\" with a knife. However, she notes that after thinking about ending her life for several moments that \"I couldn't leave my two grand children nor my dog behind, and my Dad would be really upset if we reunited early because I did kill myself.\" She continues to endorse frustration and conflict with her daughter Katrin over caring for her grand daughter Sydnie whom she cared for many years while Katrin was in active substance abuse. Majority of session today spent discussing ways that patient could become more integrated into her community and the importance of accepting the change of no longer having her grandchild to care for as her primary caregiver. She does continue to endorse symptoms of major depressive disorder, including hypersomnia (\"I fall asleep as soon as I can at night because I cannot stand being up at night and want to spend as much time asleep as I can\"), feelings of excess guilt and shame (mostly around her weight, she perseverates on gaining 3 lbs in the interval), low energy and concentration, anhedonia (having difficulty deciding about how to spend her time because potential social groups she could join do not appeal to her), and she otherwise denies suicidal ideation at this time (Noting that she could not leave her grandchildren without her). She is agreeable to close follow up in two weeks and to look for social groups to fill her time in the interval, and for referral to partial program (would attend in person at [facility] but would need more information to see if it would align with her work schedule). Highly self critical dialogue noted during evaluation today, and cognitive therapy spent discussing the benefits vs cons of such critical self remarks, as well as etiology.     She denies suicidal ideation, intent or plan at present; denies homicidal ideation, intent or plan at " present.    She denies auditory hallucinations, denies visual hallucinations, has no overt delusions noted.    She denies any side effects from medications.    HPI ROS Appetite Changes and Sleep:     She reports hypersomnia, fluctuating appetite, low energy    Current Rating Scores:     None completed today.    Review Of Systems:      Constitutional negative   ENT negative   Cardiovascular negative   Respiratory negative   Gastrointestinal negative   Genitourinary negative   Musculoskeletal arthralgias   Integumentary negative   Neurological negative   Endocrine negative   Other Symptoms none, all other systems are negative       Past Psychiatric History: (unchanged information from previous note copied and updated)     Previous inpatient psychiatric admissions: denies.  Previous inpatient/outpatient substance abuse rehabilitation: denies.  Present/previous outpatient psychiatric treatment: denies.  Present/previous psychotherapy: In therapy.  History of suicidal attempts/gestures: Denies.  History of violence/aggressive behaviors: denies.  Present/previous psychotropic medication use: Celexa 40mg QD.     Traumatic History: (unchanged information from previous note copied and updated)     Abuse:emotional and verbal  Other Traumatic Events:  defer     Substance Abuse History:     Patient denies history of alcohol, illict substance, or tobacco abuse.     Kimberly TERRAZAS does not apear under the influence or withdrawal of any psychoactive substance throughout today's examination.      Social History:     Educational History:  Academic history: high school diploma/GED  Marital history:   Social support system: parents, extended family, and friend(s)  Residential history: Resides in home; lives by herself  Vocational History: works as healthcare aid  Access to guns/weapons: none  Legal History: denies           Family Psychiatric History:      Suicide attempts in all three of her children; no hx of bipolar or  schizophrenia in family     Past Medical History:    Past Medical History:   Diagnosis Date    Anxiety     Depression 2013    Heart murmur     Obesity 1979    S/p partial hysterectomy with remaining cervical stump 05/03/2018        Past Surgical History:   Procedure Laterality Date    TOTAL ABDOMINAL HYSTERECTOMY      TUBAL LIGATION      WISDOM TOOTH EXTRACTION       Allergies   Allergen Reactions    Effexor [Venlafaxine] Hives     Patient developed hives when taking this medication. Improved with stopping medication and returned when patient retried medication on her own.        Current Outpatient Medications:    Current Outpatient Medications   Medication Sig Dispense Refill    citalopram (CeleXA) 40 mg tablet Take 1 tablet (40 mg total) by mouth daily 30 tablet 2    DULoxetine (CYMBALTA) 20 mg capsule Take 1 capsule (20 mg total) by mouth 2 (two) times a day 60 capsule 2    aspirin (ECOTRIN LOW STRENGTH) 81 mg EC tablet Take 81 mg by mouth      celecoxib (CeleBREX) 100 mg capsule Take 1 capsule (100 mg total) by mouth 2 (two) times a day 60 capsule 0    ergocalciferol (VITAMIN D2) 50,000 units Take 1 capsule (50,000 Units total) by mouth once a week 12 capsule 5    hydrOXYzine HCL (ATARAX) 25 mg tablet Take 1 tablet (25 mg total) by mouth 2 (two) times a day as needed for itching or anxiety 14 tablet 0    levothyroxine 75 mcg tablet Take 1 tablet (75 mcg total) by mouth daily 90 tablet 3    meclizine (ANTIVERT) 25 mg tablet Take 1 tablet (25 mg total) by mouth 3 (three) times a day as needed for dizziness for up to 15 days 45 tablet 0    Multiple Vitamins-Minerals (MULTIVITAMIN ADULTS 50+) TABS Take by mouth      rosuvastatin (CRESTOR) 10 MG tablet Take 1 tablet (10 mg total) by mouth daily 90 tablet 3     No current facility-administered medications for this visit.       History Review: The following portions of the patient's history were reviewed and updated as appropriate: allergies, current medications, past  family history, past medical history, past social history, past surgical history, and problem list.       OBJECTIVE:     Vital signs in last 24 hours:    There were no vitals filed for this visit.    Mental Status Evaluation:    Appearance age appropriate, casually dressed, overweight   Behavior cooperative, mildly anxious   Speech normal rate, normal volume, normal pitch   Mood depressed   Affect constricted, tearful, mood-congruent   Thought Processes organized, goal directed   Associations intact associations   Thought Content no overt delusions   Perceptual Disturbances: no auditory hallucinations, no visual hallucinations   Abnormal Thoughts  Risk Potential Suicidal ideation - None  Homicidal ideation - None  Potential for aggression - No   Orientation oriented to person, place, time/date, and situation   Memory recent and remote memory grossly intact   Consciousness alert and awake   Attention Span Concentration Span attention span and concentration are age appropriate   Intellect appears to be of average intelligence   Insight intact   Judgement intact   Muscle Strength and  Gait normal muscle strength and normal muscle tone, normal gait and normal balance   Motor activity no abnormal movements   Language no difficulty naming common objects, no difficulty repeating a phrase, no difficulty writing a sentence   Fund of Knowledge adequate knowledge of current events  adequate fund of knowledge regarding past history  adequate fund of knowledge regarding vocabulary    Pain none   Pain Scale 0       Laboratory Results: I have personally reviewed all pertinent laboratory/tests results    Recent Labs:   Appointment on 02/10/2025   Component Date Value    LDL Direct 02/10/2025 95     [facility].888        Suicide/Homicide Risk Assessment:    The following interventions are recommended: continue medication management. Although patient's acute lethality risk is low, long-term/chronic lethality risk  is mildly elevated in the presence of MDD, KRISTIN. At the current moment, Kimberly TERRAZAS is future-oriented, forward-thinking, and demonstrates ability to act in a self-preserving manner as evidenced by volitionally presenting to the clinic today, seeking treatment.To mitigate future risk, patient should adhere to the recommendations of this writer, avoid alcohol/illicit substance use, utilize community-based resources and familiar support and prioritize mental health treatment.     Presently, patient denies suicidal/homicidal ideation in addition to thoughts of self-injury; contracts for safety, see below for risk assessment. At conclusion of evaluation, patient is amenable to the recommendations of this writer including: medication management and psychotherapy, PHP referral.  Also, patient is amenable to calling/contacting the outpatient office including this writer if any acute adverse effects of their medication regimen arise in addition to any comments or concerns pertaining to their psychiatric management.  Patient is amenable to calling/contacting crisis and/or attending to the nearest emergency department if their clinical condition deteriorates to assure their safety and stability, stating that they are able to appropriately confide in their psychiatrist and thereapist regarding their psychiatric state.     At this juncture, inpatient hospitalization is not currently warranted. The following interventions are recommended:   no intervention changes    To mitigate future risk, patient should adhere to the recommendations of this writer, avoid alcohol/illicit substance use, utilize community-based resources and familiar support and prioritize mental health treatment.     Psychotherapy Provided:     Individual psychotherapy provided: Yes     Treatment Plan:    Completed and signed during the session: Yes - with Kimberly TERRAZAS    Note Share:    This note was not shared with the patient due to reasonable likelihood of causing  patient harm    Visit Time    Visit Start Time: 8:05 AM  Visit Stop Time: 8:55 AM  Total Visit Duration:  50 minutes    Alex Espino MD 02/11/25

## 2025-02-11 NOTE — PROGRESS NOTES
Name: Kimberly Montanez      : 1960      MRN: [id number]  Encounter Provider: Batsheva Gomes DO  Encounter Date: 2025   Encounter department: [place] [facility]  :  Assessment & Plan  Bilateral shoulder pain, unspecified chronicity    Orders:    Ambulatory Referral to Orthopedic Surgery; Future    Ambulatory Referral to Physical Therapy; Future    celecoxib (CeleBREX) 100 mg capsule; Take 1 capsule (100 mg total) by mouth 2 (two) times a day  Celebrex BID with food  PT eval and Ortho referral   Primary osteoarthritis of both knees    Orders:    Ambulatory Referral to Orthopedic Surgery; Future    Ambulatory Referral to Physical Therapy; Future    celecoxib (CeleBREX) 100 mg capsule; Take 1 capsule (100 mg total) by mouth 2 (two) times a day  Handicap placard completed   Hypothyroidism, unspecified type  TSH wnl and midrange Continue current rx       Severe episode of recurrent major depressive disorder, without psychotic features (HCC)  Depression Screening Follow-up Plan: Patient's depression screening was positive with a PHQ-9 score of 24. Continue regular follow-up with their psychologist/therapist/psychiatrist who is managing their mental health condition(s).         Morbid obesity with BMI of 50.0-59.9, adult (HCC)  Wt mgmt appt upcoming She has cut calories, drinking more water and decreasing carb intake .BMI Counseling: Body mass index is 56.41 kg/m². The BMI is above normal. Patient referred to weight management due to patient being morbidly obese.                 Depression Screening and Follow-up Plan: Patient's depression screening was positive with a PHQ-9 score of 24.   Continue regular follow-up with their mental health provider who is managing their mental health condition(s).     History of Present Illness   HPI  Pt has been frustrated since for last few weeks she has changed her diet to help with weight loss She is drinking more water and cutting calories /eating healthier She did  lose her dad since last visit and is trying to visit her Mom more often The weather and winter has had an impact She is limited due to knee and back pain She had to cancel PT previously due to her dad passing She has wt mgmt appt in June She has followup with psych today   Review of Systems   Constitutional:  Negative for chills and fever.   HENT: Negative.     Eyes:  Negative for visual disturbance.   Respiratory:  Negative for cough and shortness of breath.    Cardiovascular: Negative.    Genitourinary: Negative.    Musculoskeletal:  Positive for arthralgias and gait problem.   Neurological:  Negative for dizziness, light-headedness and headaches.   Psychiatric/Behavioral:  Negative for sleep disturbance. The patient is not nervous/anxious.      Past Medical History:   Diagnosis Date    Anxiety     Depression 2013    Heart murmur     Obesity 1979    S/p partial hysterectomy with remaining cervical stump 05/03/2018     Past Surgical History:   Procedure Laterality Date    TOTAL ABDOMINAL HYSTERECTOMY      TUBAL LIGATION      WISDOM TOOTH EXTRACTION       Social History     Socioeconomic History    Marital status:      Spouse name: Not on file    Number of children: Not on file    Years of education: Not on file    Highest education level: Not on file   Occupational History    Not on file   Tobacco Use    Smoking status: Never    Smokeless tobacco: Never   Vaping Use    Vaping status: Never Used   Substance and Sexual Activity    Alcohol use: No    Drug use: No    Sexual activity: Not Currently     Partners: Male   Other Topics Concern    Not on file   Social History Narrative    Not on file     Social Drivers of Health     Financial Resource Strain: Not on file   Food Insecurity: Not on file   Transportation Needs: Not on file   Physical Activity: Not on file   Stress: Not on file   Social Connections: Not on file   Intimate Partner Violence: Not on file   Housing Stability: Not on file     Allergies  "  Allergen Reactions    Effexor [Venlafaxine] Hives     Patient developed hives when taking this medication. Improved with stopping medication and returned when patient retried medication on her own.        Objective   /78   Pulse 86   Temp (!) 97.3 °F (36.3 °C) (Temporal)   Resp 18   Ht 5' 5\" (1.651 m)   Wt (!) 154 kg (339 lb)   SpO2 96%   BMI 56.41 kg/m²      Physical Exam  Vitals and nursing note reviewed.   Constitutional:       General: She is not in acute distress.     Appearance: Normal appearance. She is not ill-appearing, toxic-appearing or diaphoretic.   HENT:      Head: Normocephalic and atraumatic.      Right Ear: External ear normal.      Left Ear: External ear normal.      Nose: Nose normal.      Mouth/Throat:      Mouth: Mucous membranes are moist.   Eyes:      General: No scleral icterus.  Cardiovascular:      Rate and Rhythm: Normal rate and regular rhythm.      Pulses: Normal pulses.      Heart sounds: Normal heart sounds.   Pulmonary:      Effort: Pulmonary effort is normal. No respiratory distress.      Breath sounds: Normal breath sounds. No wheezing.   Abdominal:      General: Bowel sounds are normal. There is no distension.      Palpations: Abdomen is soft.      Tenderness: There is no abdominal tenderness.   Musculoskeletal:      Cervical back: Normal range of motion and neck supple.      Right lower leg: No edema.      Left lower leg: No edema.   Lymphadenopathy:      Cervical: No cervical adenopathy.   Skin:     General: Skin is warm and dry.   Neurological:      General: No focal deficit present.      Mental Status: She is alert and oriented to person, place, and time. Mental status is at baseline.      Cranial Nerves: No cranial nerve deficit.   Psychiatric:         Mood and Affect: Mood normal.         Behavior: Behavior normal.         Thought Content: Thought content normal.         Judgment: Judgment normal.         "

## 2025-02-11 NOTE — BH TREATMENT PLAN
TREATMENT PLAN (Medication Management Only)        [facility]    Name and Date of Birth:  Kimberly Montanez 64 y.o. 1960  Date of Treatment Plan: February 11, 2025  Diagnosis/Diagnoses:    1. Severe episode of recurrent major depressive disorder, without psychotic features (HCC)    2. KRISTIN (generalized anxiety disorder)    3. Grief      Strengths/Personal Resources for Self-Care: Taking medications as prescribed, ability to adapt to life changes, ability to communicate needs, ability to communicate well, ability to listen, ability to reason.  Area/Areas of need (in own words): anxiety symptoms, depressive symptoms  1. Long Term Goal: improve control of depression.  Target Date:6 months - 8/11/2025  Person/Persons responsible for completion of goal: Kimberly TERRAZAS  2.  Short Term Objective (s) - How will we reach this goal?:   A. Provider new recommended medication/dosage changes and/or continue medication(s): continue current medications as prescribed.  B.  PHP referral .  C.  Continue Psychotherapy .  Target Date:6 months - 8/11/2025  Person/Persons Responsible for Completion of Goal: Kimberly TERRAZAS  Progress Towards Goals: continuing treatment  Treatment Modality: medication management every 1 months  Review due 180 days from date of this plan: 6 months - 8/11/2025  Expected length of service: maintenance  My Physician and I have developed this plan together and I agree to work on the goals and objectives. I understand the treatment goals that were developed for my treatment.

## 2025-02-11 NOTE — TELEPHONE ENCOUNTER
Called pt to offer PHP but pt stated she can start the program only in March due to the other appointments already scheduled and also she will have to work two days until 10.30 AM and can start the program only at 11 AM during those days. Pt was informed that we will contact her back towards the end of this month to schedule after confirming with the clinical team if this is possible to come late two days.

## 2025-02-11 NOTE — ASSESSMENT & PLAN NOTE
Depression Screening Follow-up Plan: Patient's depression screening was positive with a PHQ-9 score of 24. Continue regular follow-up with their psychologist/therapist/psychiatrist who is managing their mental health condition(s).

## 2025-02-13 ENCOUNTER — TELEPHONE (OUTPATIENT)
Dept: PSYCHOLOGY | Facility: CLINIC | Age: 65
End: 2025-02-13

## 2025-02-13 NOTE — TELEPHONE ENCOUNTER
Called pt back today about her request for coming late two days to the program and explained to her our management decision/suggestion that she should not be late for the program any of the days and also informed that PHP team can help her with any kind of paperwork for getting off from work. Pt stated she will try what she can do and will call me back if she can do this.

## 2025-02-14 ENCOUNTER — HOSPITAL ENCOUNTER (EMERGENCY)
Facility: HOSPITAL | Age: 65
Discharge: HOME | End: 2025-02-14
Attending: EMERGENCY MEDICINE | Admitting: EMERGENCY MEDICINE
Payer: COMMERCIAL

## 2025-02-14 VITALS
TEMPERATURE: 97.6 F | SYSTOLIC BLOOD PRESSURE: 132 MMHG | RESPIRATION RATE: 20 BRPM | WEIGHT: 293 LBS | OXYGEN SATURATION: 98 % | HEIGHT: 65 IN | DIASTOLIC BLOOD PRESSURE: 72 MMHG | HEART RATE: 86 BPM | BODY MASS INDEX: 48.82 KG/M2

## 2025-02-14 DIAGNOSIS — S83.92XA LEFT KNEE SPRAIN: ICD-10-CM

## 2025-02-14 DIAGNOSIS — M25.562 LEFT KNEE PAIN: Primary | ICD-10-CM

## 2025-02-14 PROCEDURE — 99283 EMERGENCY DEPT VISIT LOW MDM: CPT

## 2025-02-14 PROCEDURE — 99284 EMERGENCY DEPT VISIT MOD MDM: CPT | Performed by: EMERGENCY MEDICINE

## 2025-02-14 RX ORDER — OXYCODONE HYDROCHLORIDE 5 MG/1
5 TABLET ORAL
Qty: 6 TABLET | Refills: 0 | Status: SHIPPED | OUTPATIENT
Start: 2025-02-14

## 2025-02-14 NOTE — ED PROVIDER NOTES
Time reflects when diagnosis was documented in both MDM as applicable and the Disposition within this note       Time User Action Codes Description Comment    2/14/2025 12:39 PM Carlos Cooper [M25.562] Left knee pain     2/14/2025 12:40 PM Danielezekiel Carlos Medina [S83.92XA] Left knee sprain           ED Disposition       ED Disposition   Discharge    Condition   Stable    Date/Time   Fri Feb 14, 2025 12:39 PM    Comment   Kimberly Montanez discharge to home/self care.                   Assessment & Plan       Medical Decision Making  64-year-old female presents for evaluation of left knee pain.  Patient with known severe osteoarthritis in both knees states that approximately 2 days ago, she was getting up from a chair when she felt immediate pain in the posterior aspect of her left knee.  On exam, there is tenderness along the lateral and posterior aspects of the left knee without laxity.  There is no swelling.  Patient has been using prescribed Celebrex and ibuprofen for symptom management with minimal relief.  Patient having some difficulty ambulating due to the pain.  No direct trauma.  No other injuries.  Patient was able to ambulate into the emergency department albeit with some difficulty.  On exam, she is overall well-appearing and not in acute distress.    Symptom management.  No imaging at this time-last imaging November 2024 showed severe osteoarthritis.  No direct trauma or bony tenderness.  Patient does have follow-up appointment with orthopedic surgery on 2/20/2025 as well as physical therapy.  Patient has been closely followed by her PCP for this problem.  Provided patient with a walker.  Oxycodone for nighttime use as needed.  PDMP queried-no record available.  Return precautions provided.    Risk  Prescription drug management.             Medications - No data to display    ED Risk Strat Scores                          SBIRT 22yo+      Flowsheet Row Most Recent Value   Initial Alcohol Screen: US  AUDIT-C     1. How often do you have a drink containing alcohol? 0 Filed at: 02/14/2025 1211   2. How many drinks containing alcohol do you have on a typical day you are drinking?  0 Filed at: 02/14/2025 1211   3a. Male UNDER 65: How often do you have five or more drinks on one occasion? 0 Filed at: 02/14/2025 1211   3b. FEMALE Any Age, or MALE 65+: How often do you have 4 or more drinks on one occassion? 0 Filed at: 02/14/2025 1211   Audit-C Score 0 Filed at: 02/14/2025 1211   TRES: How many times in the past year have you...    Used an illegal drug or used a prescription medication for non-medical reasons? Never Filed at: 02/14/2025 1211                            History of Present Illness       Chief Complaint   Patient presents with    Knee Pain     Patient reports that she developed pain behind left knee yesterday after standing up from chair       Past Medical History:   Diagnosis Date    Anxiety     Depression 2013    Heart murmur     Obesity 1979    S/p partial hysterectomy with remaining cervical stump 05/03/2018      Past Surgical History:   Procedure Laterality Date    TOTAL ABDOMINAL HYSTERECTOMY      TUBAL LIGATION      WISDOM TOOTH EXTRACTION        Family History   Problem Relation Age of Onset    No Known Problems Mother     Hypertension Father     Dementia Father     Depression Daughter     Depression Daughter     ADD / ADHD Son     Depression Son     No Known Problems Paternal Aunt     Breast cancer Cousin       Social History     Tobacco Use    Smoking status: Never    Smokeless tobacco: Never   Vaping Use    Vaping status: Never Used   Substance Use Topics    Alcohol use: No    Drug use: No      E-Cigarette/Vaping    E-Cigarette Use Never User       E-Cigarette/Vaping Substances    Nicotine No     THC No     CBD No     Flavoring No     Other No     Unknown No       I have reviewed and agree with the history as documented.     64-year-old female presents for evaluation of left knee pain.  Patient  with known severe osteoarthritis in both knees states that approximately 2 days ago, she was getting up from a chair when she felt immediate pain in the posterior aspect of her left knee.  On exam, there is tenderness along the lateral and posterior aspects of the left knee without laxity.  There is no swelling.  Patient has been using prescribed Celebrex and ibuprofen for symptom management with minimal relief.  Patient having some difficulty ambulating due to the pain.  No direct trauma.  No other injuries.  Patient was able to ambulate into the emergency department albeit with some difficulty.  On exam, she is overall well-appearing and not in acute distress.          Review of Systems   Constitutional:  Negative for activity change, appetite change, chills, diaphoresis, fatigue and fever.   HENT:  Negative for dental problem, ear pain, sore throat, trouble swallowing and voice change.    Eyes:  Negative for pain and visual disturbance.   Respiratory:  Negative for cough, chest tightness, shortness of breath and wheezing.    Cardiovascular:  Negative for chest pain, palpitations and leg swelling.   Gastrointestinal:  Negative for abdominal pain, anal bleeding, blood in stool, diarrhea, nausea, rectal pain and vomiting.   Endocrine: Negative for polydipsia, polyphagia and polyuria.   Genitourinary:  Negative for difficulty urinating, dysuria, flank pain, frequency, hematuria and urgency.   Musculoskeletal:  Negative for back pain, joint swelling, myalgias, neck pain and neck stiffness.        Knee pain   Skin:  Negative for pallor, rash and wound.   Neurological:  Negative for dizziness, facial asymmetry, speech difficulty, weakness, light-headedness, numbness and headaches.   Hematological:  Negative for adenopathy.   Psychiatric/Behavioral:  Negative for agitation. The patient is not nervous/anxious.    All other systems reviewed and are negative.          Objective       ED Triage Vitals [02/14/25 1211]    Temperature Pulse Blood Pressure Respirations SpO2 Patient Position - Orthostatic VS   (!) 96.9 °F (36.1 °C) 96 (!) 179/116 20 98 % Sitting      Temp Source Heart Rate Source BP Location FiO2 (%) Pain Score    Temporal Monitor Right arm -- 10 - Worst Possible Pain      Vitals      Date and Time Temp Pulse SpO2 Resp BP Pain Score FACES Pain Rating User   02/14/25 1248 97.6 °F (36.4 °C) 86 98 % 20 132/72 -- -- AP   02/14/25 1211 96.9 °F (36.1 °C) 96 98 % 20 179/116 10 - Worst Possible Pain -- PP            Physical Exam  Vitals and nursing note reviewed.   Constitutional:       General: She is not in acute distress.     Appearance: She is well-developed. She is obese. She is not ill-appearing, toxic-appearing or diaphoretic.   HENT:      Head: Normocephalic and atraumatic.      Right Ear: External ear normal.      Left Ear: External ear normal.      Nose: Nose normal. No congestion or rhinorrhea.      Mouth/Throat:      Mouth: Mucous membranes are moist.   Eyes:      General: No visual field deficit or scleral icterus.        Right eye: No discharge.         Left eye: No discharge.      Extraocular Movements: Extraocular movements intact.      Conjunctiva/sclera: Conjunctivae normal.      Pupils: Pupils are equal, round, and reactive to light.   Neck:      Thyroid: No thyromegaly.      Vascular: No JVD.      Trachea: No tracheal deviation.   Cardiovascular:      Rate and Rhythm: Normal rate and regular rhythm.      Pulses: Normal pulses.      Heart sounds: Normal heart sounds, S1 normal and S2 normal. No murmur heard.     No friction rub. No gallop.   Pulmonary:      Effort: Pulmonary effort is normal. No accessory muscle usage, respiratory distress or retractions.      Breath sounds: Normal breath sounds and air entry. No stridor or decreased air movement. No decreased breath sounds, wheezing, rhonchi or rales.   Chest:      Chest wall: No tenderness.   Abdominal:      General: There is no distension.       Palpations: Abdomen is soft. There is no mass.      Tenderness: There is no abdominal tenderness. There is no guarding or rebound.      Hernia: No hernia is present.   Musculoskeletal:         General: No swelling or deformity.      Cervical back: Normal range of motion and neck supple. No rigidity.      Left knee: No swelling, deformity, effusion, erythema, ecchymosis, bony tenderness or crepitus. Decreased range of motion. Tenderness present over the lateral joint line. Normal pulse.      Instability Tests: Anterior drawer test negative. Posterior drawer test negative.      Right lower leg: No edema.      Left lower leg: No edema.      Comments: Left knee: Tenderness along the lateral and posterior aspects.  No obvious deformity.  No laxity.  Neurovascular intact.   Lymphadenopathy:      Cervical: No cervical adenopathy.   Skin:     General: Skin is warm and dry.      Capillary Refill: Capillary refill takes less than 2 seconds.      Coloration: Skin is not pale.      Findings: No erythema or rash.   Neurological:      General: No focal deficit present.      Mental Status: She is alert and oriented to person, place, and time.      GCS: GCS eye subscore is 4. GCS verbal subscore is 5. GCS motor subscore is 6.      Cranial Nerves: Cranial nerves 2-12 are intact. No cranial nerve deficit, dysarthria or facial asymmetry.      Sensory: Sensation is intact. No sensory deficit.      Motor: Motor function is intact. No weakness, tremor, atrophy, abnormal muscle tone or seizure activity.      Coordination: Coordination is intact. Coordination normal.      Gait: Gait is intact.      Deep Tendon Reflexes: Reflexes are normal and symmetric. Reflexes normal.   Psychiatric:         Behavior: Behavior normal.         Results Reviewed       None            No orders to display       Procedures    ED Medication and Procedure Management   Prior to Admission Medications   Prescriptions Last Dose Informant Patient Reported? Taking?    DULoxetine (CYMBALTA) 20 mg capsule   No No   Sig: Take 1 capsule (20 mg total) by mouth 2 (two) times a day   Multiple Vitamins-Minerals (MULTIVITAMIN ADULTS 50+) TABS  Self Yes No   Sig: Take by mouth   aspirin (ECOTRIN LOW STRENGTH) 81 mg EC tablet  Self Yes No   Sig: Take 81 mg by mouth   celecoxib (CeleBREX) 100 mg capsule   No No   Sig: Take 1 capsule (100 mg total) by mouth 2 (two) times a day   citalopram (CeleXA) 40 mg tablet   No No   Sig: Take 1 tablet (40 mg total) by mouth daily   ergocalciferol (VITAMIN D2) 50,000 units  Self No No   Sig: Take 1 capsule (50,000 Units total) by mouth once a week   hydrOXYzine HCL (ATARAX) 25 mg tablet   No No   Sig: Take 1 tablet (25 mg total) by mouth 2 (two) times a day as needed for itching or anxiety   levothyroxine 75 mcg tablet   No No   Sig: Take 1 tablet (75 mcg total) by mouth daily   meclizine (ANTIVERT) 25 mg tablet   No No   Sig: Take 1 tablet (25 mg total) by mouth 3 (three) times a day as needed for dizziness for up to 15 days   rosuvastatin (CRESTOR) 10 MG tablet   No No   Sig: Take 1 tablet (10 mg total) by mouth daily      Facility-Administered Medications: None     Patient's Medications   Discharge Prescriptions    OXYCODONE (ROXICODONE) 5 IMMEDIATE RELEASE TABLET    Take 1 tablet (5 mg total) by mouth daily at bedtime as needed for moderate pain for up to 6 doses Max Daily Amount: 5 mg       Start Date: 2/14/2025 End Date: --       Order Dose: 5 mg       Quantity: 6 tablet    Refills: 0     No discharge procedures on file.  ED SEPSIS DOCUMENTATION   Time reflects when diagnosis was documented in both MDM as applicable and the Disposition within this note       Time User Action Codes Description Comment    2/14/2025 12:39 PM Carlos Cooper [M25.562] Left knee pain     2/14/2025 12:40 PM Carlos Cooper [S83.92XA] Left knee sprain                  Carlos Cooper,   02/14/25 7888

## 2025-02-14 NOTE — Clinical Note
Kimberly MK Canaleser was seen and treated in our emergency department on 2/14/2025.        No work until cleared by Family Doctor/Orthopedics        Diagnosis:     Kimberly TERRAZAS  .    She may return on this date:          If you have any questions or concerns, please don't hesitate to call.      Carlos Cooper, DO    ______________________________           _______________          _______________  Hospital Representative                              Date                                Time

## 2025-02-20 ENCOUNTER — EVALUATION (OUTPATIENT)
Dept: PHYSICAL THERAPY | Facility: HOME HEALTHCARE | Age: 65
End: 2025-02-20
Payer: COMMERCIAL

## 2025-02-20 DIAGNOSIS — M25.512 BILATERAL SHOULDER PAIN, UNSPECIFIED CHRONICITY: ICD-10-CM

## 2025-02-20 DIAGNOSIS — M25.511 BILATERAL SHOULDER PAIN, UNSPECIFIED CHRONICITY: ICD-10-CM

## 2025-02-20 DIAGNOSIS — M17.0 PRIMARY OSTEOARTHRITIS OF BOTH KNEES: ICD-10-CM

## 2025-02-20 PROCEDURE — 97110 THERAPEUTIC EXERCISES: CPT | Performed by: PHYSICAL THERAPIST

## 2025-02-20 PROCEDURE — 97163 PT EVAL HIGH COMPLEX 45 MIN: CPT | Performed by: PHYSICAL THERAPIST

## 2025-02-20 NOTE — PROGRESS NOTES
"PT Evaluation     Today's date: 2025  Patient name: Kimberly Montanez  : 1960  MRN: [id number]  Referring provider: Batsheva Gomes DO  Dx:   Encounter Diagnosis     ICD-10-CM    1. Bilateral shoulder pain, unspecified chronicity  M25.511 Ambulatory Referral to Physical Therapy    M25.512       2. Primary osteoarthritis of both knees  M17.0 Ambulatory Referral to Physical Therapy                     Assessment  Impairments: abnormal or restricted ROM, abnormal movement, activity intolerance, impaired physical strength, lacks appropriate home exercise program, pain with function, scapular dyskinesis, weight-bearing intolerance, poor posture , unable to perform ADL, activity limitations and endurance    Assessment details: Pt Kimberly Montanez is a 64 y.o. who presents to OPPT with s/s consistent with B shoulder and B knee pain due to OA. Pt presents with significant mobility deficits in B shoulders/B knee, decreased LE/UE strength, postural dysfunction, impaired soft tissue mobility, and pain with functional activities. Pt reporting tolerance < 10 minutes to standing/walking, modifications to washing, dressing, cooking, cleaning, and disrupted sleep due to deficits. Pt would benefit from skilled therapy services to address outlined impairments, work towards goals, and restore pts PLOF. Thank you!   Understanding of Dx/Px/POC: good     Prognosis: good    Goals  STGs to be achieved in 4 weeks:  -Pt to demonstrate reduced subjective pain rating \"at worst\" by at least 2-3 points from Initial Eval to allow for reduced pain with ADLs and improved functional activity tolerance.   -Pt to demonstrate B knee ROM improved > 20* flexion in order to maximize joint mobility and function and allow for progression of exercise program and achievement of goals.   -Pt to demonstrate B shoulder ROM improved > 20* flexion/abduction in order to maximize joint mobility and function and allow for progression of exercise program " and achievement of goals.   -Pt to demonstrate increased MMT of B UE/LE  by at least 1/2 grade in order to improve safety and stability with ADLs and functional mobility.     LTGs to be achieved upon discharge:   -Pt will be I with HEP in order to continue to improve quality of life and independence and reduce risk for re-injury.   -Pt to demonstrate return to activities of daily living without limiations or restrictions.   -Pt will return to lifting/carrying > 5# to help facilitate return to community activities independently   -Pt to demonstrate return to ambulation with AD > 20 minutes to return to community based activities without limitations.   -Pt to demonstrate improved function as noted by achieving or exceeding predicted score on FOTO outcomes assessment tool.       Plan  Patient would benefit from: skilled physical therapy  Planned modality interventions: thermotherapy: hydrocollator packs    Planned therapy interventions: manual therapy, neuromuscular re-education, patient education, postural training, therapeutic exercise, therapeutic activities, home exercise program, flexibility and functional ROM exercises    Frequency: 2x week  Duration in weeks: 12  Plan of Care beginning date: 2025  Plan of Care expiration date: 5/15/2025  Treatment plan discussed with: patient and referring physician        Subjective Evaluation    History of Present Illness  Mechanism of injury: Pt reporting that she has been having knee and shoulder pain since the fall.  She had x-rays taken which were (+) for severe joint OA B knees and mild to moderate OA B shoulders. Pt ended up in ED on 25 due to severe pain and immobility.  PCP follow up with referral to OPPT for conservative management of s/s.     Quality of life: good    Patient Goals  Patient goals for therapy: decreased pain, improved balance, increased motion, increased strength and independence with ADLs/IADLs    Pain  At best pain ratin  At worst pain  rating: 10  Quality: dull ache, grinding, tight and throbbing  Relieving factors: medications  Aggravating factors: standing, walking, stair climbing, lifting and overhead activity  Progression: no change    Social Support  Steps to enter house: yes      Diagnostic Tests  X-ray: abnormal  Treatments  Current treatment: physical therapy        Objective     Active Range of Motion   Left Shoulder   Flexion: 70 degrees   Abduction: 55 degrees   External rotation 0°: Left shoulder active external rotation at 0 degrees: 50%   Internal rotation 0°: WFL    Right Shoulder   Flexion: 80 degrees   Abduction: 65 degrees   External rotation 0°: Right shoulder active external rotation at 0 degrees: 50%   Internal rotation 0°: WFL  Left Knee   Flexion: 30 degrees   Extension: 0 degrees     Right Knee   Flexion: 40 degrees   Extension: 0 degrees     Strength/Myotome Testing     Left Shoulder     Planes of Motion   Flexion: 2-   Abduction: 2-   External rotation at 0°: 3-   Internal rotation at 0°: 3+     Right Shoulder     Planes of Motion   Flexion: 2-   Abduction: 2-   External rotation at 0°: 3-   Internal rotation at 0°: 3+     Left Knee   Flexion: 3+  Extension: 2-    Right Knee   Flexion: 3+  Extension: 2-    Ambulation   Weight-Bearing Status   Assistive device used: wheeled walker    Additional Weight-Bearing Status Details  Tolerance x 10 minutes with AD     Ambulation: Stairs   Pattern: non-reciprocal  Railings: one rail  Pattern: non-reciprocal  Railings: one rail    Observational Gait   Gait: antalgic   Decreased walking speed and stride length.     Functional Assessment        Comments  Standing tolerance < 10 minutes  Sitting for cooking/dishes   Use of shower chair   Lifting tolerance < 5#   Reaching ability - shoulder height only                    Visit Count 1          Manuals 2/20                                       Neuro Re-Ed                                                 Ther Ex        NuStep for ROM and  conditioning         HR/TR        Standing hip flex         Standing hip abd         Seated hip add HEP        Seated LAQ HEP        Heel slides         QS         SAQ                 Shld shrugs  HEP        Retractions  HEP        Pulleys: flex/scap         Elbow curls         Cane flex        Table slides                                                 Ther Activity                        Gait Training                        Modalities

## 2025-02-24 ENCOUNTER — OFFICE VISIT (OUTPATIENT)
Dept: PHYSICAL THERAPY | Facility: HOME HEALTHCARE | Age: 65
End: 2025-02-24
Payer: COMMERCIAL

## 2025-02-24 DIAGNOSIS — M17.0 PRIMARY OSTEOARTHRITIS OF BOTH KNEES: ICD-10-CM

## 2025-02-24 DIAGNOSIS — M25.511 BILATERAL SHOULDER PAIN, UNSPECIFIED CHRONICITY: Primary | ICD-10-CM

## 2025-02-24 DIAGNOSIS — M25.512 BILATERAL SHOULDER PAIN, UNSPECIFIED CHRONICITY: Primary | ICD-10-CM

## 2025-02-24 PROCEDURE — 97110 THERAPEUTIC EXERCISES: CPT

## 2025-02-24 NOTE — PROGRESS NOTES
"Daily Note     Today's date: 2025  Patient name: Kimberly Montanez  : 1960  MRN: [id number]  Referring provider: Batsheva Gomes DO  Dx:   Encounter Diagnosis     ICD-10-CM    1. Bilateral shoulder pain, unspecified chronicity  M25.511     M25.512       2. Primary osteoarthritis of both knees  M17.0                      Subjective: Pt reports she has a lot of pain in her Luis Angel shoulders and didn't sleep well last night because of the pain. Pt reports she has pain in her Luis Angel knees.       Objective: See treatment diary below      Assessment: Tolerated treatment fair. Verbal cues needed t/o exercises for correct form. Pt reports pain Luis Angel shoulders and Luis Angel knees t/o exercises. Fatigue noted during session.  Strength deficits evident Luis Angel UE/LE.  Patient would benefit from continued PT      Plan: Continue per plan of care.            Visit Count 1 2         Manuals                                       Neuro Re-Ed                                                 Ther Ex        NuStep for ROM and conditioning   L1  5'       HR/TR  1x10 ea       Standing hip flex   1x10 Luis Angel       Standing hip abd   1x10 Luis Angel       Seated hip add HEP  5\" 1x10        Seated LAQ HEP  5\" 1x5 Luis Angel       Heel slides         QS         SAQ                 Shld shrugs  HEP  1x10       Retractions  HEP  3\" 1x10       Pulleys: flex/scap   Flex 5\" 1x10 ea Luis Angel       Elbow curls   1x10       Cane flex  Seated 2x10       Table slides   Flex 1x10 Luis Angel  Scap 1x6 Luis Angel                                              Ther Activity                        Gait Training                        Modalities                                      "

## 2025-02-25 ENCOUNTER — OFFICE VISIT (OUTPATIENT)
Age: 65
End: 2025-02-25

## 2025-02-25 ENCOUNTER — OFFICE VISIT (OUTPATIENT)
Dept: OBGYN CLINIC | Facility: CLINIC | Age: 65
End: 2025-02-25
Payer: COMMERCIAL

## 2025-02-25 VITALS
WEIGHT: 293 LBS | RESPIRATION RATE: 16 BRPM | TEMPERATURE: 97.2 F | BODY MASS INDEX: 48.82 KG/M2 | HEIGHT: 65 IN | HEART RATE: 97 BPM | OXYGEN SATURATION: 96 %

## 2025-02-25 DIAGNOSIS — F43.21 GRIEF: ICD-10-CM

## 2025-02-25 DIAGNOSIS — E66.813 CLASS 3 OBESITY: Primary | ICD-10-CM

## 2025-02-25 DIAGNOSIS — M25.512 BILATERAL SHOULDER PAIN, UNSPECIFIED CHRONICITY: ICD-10-CM

## 2025-02-25 DIAGNOSIS — F41.1 GAD (GENERALIZED ANXIETY DISORDER): ICD-10-CM

## 2025-02-25 DIAGNOSIS — M25.511 BILATERAL SHOULDER PAIN, UNSPECIFIED CHRONICITY: ICD-10-CM

## 2025-02-25 DIAGNOSIS — F33.2 SEVERE EPISODE OF RECURRENT MAJOR DEPRESSIVE DISORDER, WITHOUT PSYCHOTIC FEATURES (HCC): Primary | ICD-10-CM

## 2025-02-25 DIAGNOSIS — M17.0 PRIMARY OSTEOARTHRITIS OF BOTH KNEES: ICD-10-CM

## 2025-02-25 PROCEDURE — 20610 DRAIN/INJ JOINT/BURSA W/O US: CPT | Performed by: STUDENT IN AN ORGANIZED HEALTH CARE EDUCATION/TRAINING PROGRAM

## 2025-02-25 PROCEDURE — 99244 OFF/OP CNSLTJ NEW/EST MOD 40: CPT | Performed by: STUDENT IN AN ORGANIZED HEALTH CARE EDUCATION/TRAINING PROGRAM

## 2025-02-25 PROCEDURE — PBNCHG PB NO CHARGE PLACEHOLDER

## 2025-02-25 RX ORDER — LIDOCAINE HYDROCHLORIDE 10 MG/ML
2 INJECTION, SOLUTION INFILTRATION; PERINEURAL
Status: COMPLETED | OUTPATIENT
Start: 2025-02-25 | End: 2025-02-25

## 2025-02-25 RX ORDER — BUPIVACAINE HYDROCHLORIDE 2.5 MG/ML
2 INJECTION, SOLUTION INFILTRATION; PERINEURAL
Status: COMPLETED | OUTPATIENT
Start: 2025-02-25 | End: 2025-02-25

## 2025-02-25 RX ORDER — TRIAMCINOLONE ACETONIDE 40 MG/ML
80 INJECTION, SUSPENSION INTRA-ARTICULAR; INTRAMUSCULAR
Status: COMPLETED | OUTPATIENT
Start: 2025-02-25 | End: 2025-02-25

## 2025-02-25 RX ADMIN — TRIAMCINOLONE ACETONIDE 80 MG: 40 INJECTION, SUSPENSION INTRA-ARTICULAR; INTRAMUSCULAR at 13:45

## 2025-02-25 RX ADMIN — BUPIVACAINE HYDROCHLORIDE 2 ML: 2.5 INJECTION, SOLUTION INFILTRATION; PERINEURAL at 13:45

## 2025-02-25 RX ADMIN — LIDOCAINE HYDROCHLORIDE 2 ML: 10 INJECTION, SOLUTION INFILTRATION; PERINEURAL at 13:45

## 2025-02-25 NOTE — PROGRESS NOTES
Orthopedic Surgery Office Note  Kimberly Montanez (64 y.o. female)   : 1960   MRN: [id number]   Encounter Date: 2025 with Dr. Lloyd Cobos, DO  Encounter department: [facility] [place]  Chief Complaint   Patient presents with   • Left Knee - Pain   • Right Knee - Pain       Assessment, Plan, & Discussion:     :  Assessment & Plan  Primary osteoarthritis of both knees  - Reviewed physical exam and imaging with patient at time of visit. Radiographic findings demonstrate severe osteoarthritis of bilateral knees . Her symptoms are consistent with flare-up of osteoarthritis.  - Discussed with patient that arthritis is a chronic, incurable, irreversible disease and that management would be focused on alleviating symptoms, as it is not possible to reverse or remove the degenerative changes. Discussed treatment options to include symptom masking with voltaren gel and OTC pain relievers, muscle strengthening with PT to have the joint rely on strong muscle rather than bad bone, and possible consideration of corticosteroid or visco supplementation injections in the future. Discussed definitive treatment as total joint replacement, which would be an elective, pain-relieving procedure, and that symptoms and radiographs may not align, leaving it to the patient to determine when the symptoms would warrant the surgery.   -Patient was offered and accepted cortisone injection to bilateral knees at time of visit, she tolerated the procedure well  -Patient has started physical therapy with her evaluation consultation on 2025  -Work note for patient to return to work on 3/10/2025 was placed into her chart at time of visit, patient will update us on status prior to 3/10/2025 if she needs a new work note  -She will follow-up in the office in 3 months for reevaluation of symptoms  - The patient expresses understanding and is in agreement with today's treatment plan.       Orders:  •  Ambulatory  Referral to Orthopedic Surgery  •  Large joint arthrocentesis: R knee  •  Large joint arthrocentesis: L knee    Class 3 obesity  Discussed with the patient that:  - she is on the right track with her attempts to begin leading a healthy lifestyle  - referral to nutrition placed at time of visit for consultation  - continue with TOPS (take off weight sensibly) program that she is beginning on Friday 2/28/24      Orders:  •  Ambulatory Referral to Nutrition Services; Future      Return in about 3 months (around 5/25/2025) for Recheck.      Surgery:   No surgery planned at this time      Orders:     Diagnoses and all orders for this visit:    Class 3 obesity  -     Ambulatory Referral to Nutrition Services; Future    Primary osteoarthritis of both knees  -     Ambulatory Referral to Orthopedic Surgery  -     Large joint arthrocentesis: R knee  -     Large joint arthrocentesis: L knee    Bilateral shoulder pain, unspecified chronicity  -     Ambulatory Referral to Orthopedic Surgery         History of Present Illness:     Kimberly Montanez is a 64 y.o. female who presents for consultation at the request of Batsheva Gomes DO regarding bilateral knee pain. Patient states that she has had bilateral knee pain for several years however she was able to ambulate and it did not interfere with her ability to complete activities of daily living.  However as of 2/12/2025 she has had severe pain in both of her knees that has limited her ability to ambulate, move from a sitting to a standing position, and standing for long periods of time.  Patient was seen in the ED on 2/14/2025 for severe left knee pain.  She states that she went to stand from a sitting position and felt as if she could not flex or extend her left knee.  She has been taking Celebrex which she states has not provided any relief of symptoms.  She admits to trying Tylenol, ibuprofen, and Advil previously with no relief of symptoms.  Patient denies any trauma, fall,  "or injury to bilateral knees.    Review of Systems  Constitutional: Negative for fatigue, fever or loss of appetite.   HENT: Negative.    Respiratory: Negative for shortness of breath, dyspnea.    Cardiovascular: Negative for chest pain/tightness.   Gastrointestinal: Negative for abdominal pain, N/V.   Endocrine: Negative for cold/heat intolerance, unexplained weight loss/gain.   Genitourinary: Negative for flank pain, dysuria  Skin: Negative for rash.    Psychiatric/Behavioral: Negative for agitation.  All else negative unless otherwise noted in HPI    Physical Exam:   General:  Pulse 97   Temp (!) 97.2 °F (36.2 °C) (Temporal)   Resp 16   Ht 5' 5\" (1.651 m)   Wt (!) 155 kg (341 lb)   SpO2 96%   BMI 56.75 kg/m²   Cons: Appears well.  No apparent distress.  Psych: Alert. Oriented x3.  Mood and affect normal.  Eyes: PERRLA, EOMI  Resp: Normal effort.  No audible wheezing or stridor.  CV: Extremities warm and well perfused.   Abd:    No distention or guarding.   Skin: Warm. No visible lesions.  Neuro: Normal muscle tone.      Orthopedic Exam:   bilateral knee(s) -   Patient ambulates with antalgic gait pattern  Uses Walker assistive device  Wind-swept  anatomical deformity  Skin is warm and dry to touch with no signs of erythema, ecchymosis, or infection  Mild generalized soft tissue swelling noted  ROM (0° - 90°)   MMT: 4/5 throughout  TTP over medial joint line, TTP over lateral joint line, TTP over anterior knee  Flexor and extensor mechanisms are intact   Knee is stable to varus and valgus stress  - Anterior Drawer, - Posterior Drawer  - Mary's  Patella tracks centrally without palpable crepitus  Calf compartments are soft and supple  - Coby's sign  2+ DP and PT pulses with brisk capillary refill to the toes  Sural, saphenous, tibial, superficial, and deep peroneal motor and sensory distributions intact  Sensation light touch intact distally        Imaging/Studies:     Study: XR left knee  Date: " 11/12/24  Report: I have read and agree with the radiologist report. and I have personally reviewed the imaging in PACS and my impression is as follows:  IMPRESSION:     Severe medial compartment degenerative changes as above.  Secondary varus angulation.  Moderate effusion.  Patellar enthesopathy. No fracture.    Study: XR right knee  Date: 11/12/24  Report: I have read and agree with the radiologist report. and I have personally reviewed the imaging in PACS and my impression is as follows:  IMPRESSION:     Severe medial compartment degenerative changes as above with secondary varus angulation.  Small effusion.  Patellar enthesopathy.  No fracture         Large joint arthrocentesis: R knee  [place] Protocol:  procedure performed by consultantConsent: Verbal consent obtained.  Risks and benefits: risks, benefits and alternatives were discussed  Consent given by: patient  Patient understanding: patient states understanding of the procedure being performed  Site marked: the operative site was marked  Radiology Images displayed and confirmed. If images not available, report reviewed: imaging studies available  Patient identity confirmed: verbally with patient  Supporting Documentation  Indications: pain and joint swelling   Procedure Details  Location: knee - R knee  Needle gauge: 21 G.  Ultrasound guidance: no  Approach: anteromedial  Medications administered: 80 mg triamcinolone acetonide 40 mg/mL; 2 mL bupivacaine 0.25 %; 2 mL lidocaine 1 %    Patient tolerance: patient tolerated the procedure well with no immediate complications  Dressing:  Sterile dressing applied      Large joint arthrocentesis: L knee  Universal Protocol:  procedure performed by consultantConsent: Verbal consent obtained.  Risks and benefits: risks, benefits and alternatives were discussed  Consent given by: patient  Patient understanding: patient states understanding of the procedure being performed  Site marked: the operative site was  marked  Radiology Images displayed and confirmed. If images not available, report reviewed: imaging studies available  Patient identity confirmed: verbally with patient  Supporting Documentation  Indications: pain and joint swelling   Procedure Details  Location: knee - L knee  Needle gauge: 21 G.  Ultrasound guidance: no  Approach: anteromedial  Medications administered: 80 mg triamcinolone acetonide 40 mg/mL; 2 mL bupivacaine 0.25 %; 2 mL lidocaine 1 %    Patient tolerance: patient tolerated the procedure well with no immediate complications  Dressing:  Sterile dressing applied               Medical, Surgical, Family, and Social History    The patient's medical history, family history, and social history, were reviewed and updated as appropriate.    Past Medical History:   Diagnosis Date   • Anxiety    • Depression 2013   • Disease of thyroid gland    • Heart murmur    • Obesity 1979   • Osteoarthritis 2024   • S/p partial hysterectomy with remaining cervical stump 05/03/2018     Past Surgical History:   Procedure Laterality Date   • TOTAL ABDOMINAL HYSTERECTOMY     • TUBAL LIGATION     • WISDOM TOOTH EXTRACTION       Family History   Problem Relation Age of Onset   • Arthritis Mother    • Osteoporosis Mother    • Hypertension Father    • Dementia Father    • Arthritis Father    • Osteoporosis Father    • Depression Daughter    • Depression Daughter    • ADD / ADHD Son    • Depression Son    • No Known Problems Paternal Aunt    • Breast cancer Cousin      Social History     Occupational History   • Not on file   Tobacco Use   • Smoking status: Never   • Smokeless tobacco: Never   Vaping Use   • Vaping status: Never Used   Substance and Sexual Activity   • Alcohol use: No   • Drug use: No   • Sexual activity: Not Currently     Partners: Male     Birth control/protection: Female Sterilization     Allergies   Allergen Reactions   • Effexor [Venlafaxine] Hives     Patient developed hives when taking this medication.  Improved with stopping medication and returned when patient retried medication on her own.        Current Outpatient Medications:   •  aspirin (ECOTRIN LOW STRENGTH) 81 mg EC tablet, Take 81 mg by mouth, Disp: , Rfl:   •  celecoxib (CeleBREX) 100 mg capsule, Take 1 capsule (100 mg total) by mouth 2 (two) times a day, Disp: 60 capsule, Rfl: 0  •  citalopram (CeleXA) 40 mg tablet, Take 1 tablet (40 mg total) by mouth daily, Disp: 30 tablet, Rfl: 2  •  DULoxetine (CYMBALTA) 20 mg capsule, Take 1 capsule (20 mg total) by mouth 2 (two) times a day, Disp: 60 capsule, Rfl: 2  •  ergocalciferol (VITAMIN D2) 50,000 units, Take 1 capsule (50,000 Units total) by mouth once a week, Disp: 12 capsule, Rfl: 5  •  hydrOXYzine HCL (ATARAX) 25 mg tablet, Take 1 tablet (25 mg total) by mouth 2 (two) times a day as needed for itching or anxiety, Disp: 14 tablet, Rfl: 0  •  levothyroxine 75 mcg tablet, Take 1 tablet (75 mcg total) by mouth daily, Disp: 90 tablet, Rfl: 3  •  Multiple Vitamins-Minerals (MULTIVITAMIN ADULTS 50+) TABS, Take by mouth, Disp: , Rfl:   •  oxyCODONE (Roxicodone) 5 immediate release tablet, Take 1 tablet (5 mg total) by mouth daily at bedtime as needed for moderate pain for up to 6 doses Max Daily Amount: 5 mg, Disp: 6 tablet, Rfl: 0  •  rosuvastatin (CRESTOR) 10 MG tablet, Take 1 tablet (10 mg total) by mouth daily, Disp: 90 tablet, Rfl: 3  •  meclizine (ANTIVERT) 25 mg tablet, Take 1 tablet (25 mg total) by mouth 3 (three) times a day as needed for dizziness for up to 15 days, Disp: 45 tablet, Rfl: 0      This Visit:           Scribe Attestation    I,:  Rosalinda Bryant am acting as a scribe while in the presence of the attending physician.:       I,:  Lloyd Cobos DO personally performed the services described in this documentation    as scribed in my presence.:           Dr. Lloyd Cobos DO, Orthopedic Surgeon  [facility]

## 2025-02-25 NOTE — PSYCH
(No Charge -- Indirect Supervision)  MEDICATION MANAGEMENT NOTE        [facility]      Name and Date of Birth:  Kimberly Montanez 64 y.o. 1960 MRN: [id number]    Insurance: Payor: [facility] [facility] [facility] / Plan: [employer] [facility] / Product Type: [facility] /     Date of Visit: February 25, 2025    Reason for Visit: Medication Management      Will not make medication changes. Depressive symptoms persist in the context of social isolation. Potential knee replacement with evaluation by orthopedics today. Uncertainty if patient would be able to complete partial program at this time, will reevaluate next week once patient has more information about management of her medical comorbidities. No SI, HI, AVH, or any indication for inpatient level of care. Potential referral for eskeatmine therapy, will reevaluate next week.     Assessment & Plan  Severe episode of recurrent major depressive disorder, without psychotic features (HCC)  Celexa 40mg once per day   PARQ completed including serotonin syndrome, SIADH, worsening depression, suicidality, induction of nakul, GI upset, headaches, activation, sexual side effects, sedation, potential drug interactions, and others.   Cymbalta 20mg DR twice per day  PARQ completed including serotonin syndrome, SIADH, worsened depression/suicidality, induction of nakul, blood pressure changes and GI distress, weight gain, sexual side effects, insomnia, sedation, potential for drug interactions, and others.         KRISTIN (generalized anxiety disorder)  Celexa 40mg once per day   PARQ completed including serotonin syndrome, SIADH, worsening depression, suicidality, induction of nakul, GI upset, headaches, activation, sexual side effects, sedation, potential drug interactions, and others.   Cymbalta 20mg DR twice per day  PARQ completed including serotonin syndrome, SIADH, worsened depression/suicidality, induction  "of nakul, blood pressure changes and GI distress, weight gain, sexual side effects, insomnia, sedation, potential for drug interactions, and others.         Grief            Treatment Recommendations/Precautions:    Psychotropic Medication Regiment: Cymbalta 20mg DR BID, Celexa 40mg QD  Psychotherapy: In weekly psychotherapy  Next Appointment: 2 week follow up     Aware of 24 hour and weekend coverage for urgent situations accessed by calling [facility] main practice number  I am scheduling this patient out for greater than 3 months: No     Medications Risks/Benefits:       Risks, Benefits And Possible Side Effects Of Medications:     Risks, benefits, and possible side effects of medications explained to Kimberly TERRAZAS and she verbalizes understanding and agreement for treatment.     Controlled Medication Discussion:      Not applicable    SUBJECTIVE:    Kimberly TERRAZAS is seen today for a follow up for Major Depressive Disorder.     Patient endorses worsening of pain in her knees, noting that she went to the emergency room this past week and was told she would likely need operative management. She has evaluation by orthopedics later today. Severe depressive symptoms persist, noting depressed mood virtually all days, low energy, low concentration, anhedonia, psychomotor retardation (though occurring comorbid with pain limiting mobility). She denies suicidal ideation or intent, noting that she \"wouldn't be able to do that to my own grandchildren.\" Psychotherapy provided today, discuss pervasive belief that patient is \"bad\" in the context of childhood dynamic where she feels low self worth in relation to her older brother. Discuss feelings of frustration, sadness at her mother for allowing her brother to move her into her apartment despite initially planning on having the patient help her move. Masochistic traits are evident and would benefit from exploration in psychotherapy. Discuss the importance of " "finding social connection to mitigate feelings of inherent \"badness.\" Discuss partial program, willing to discuss this topic further next week after she has a better sense of the direction of her orthopedic care.    She denies suicidal ideation, intent or plan at present; denies homicidal ideation, intent or plan at present.    She denies auditory hallucinations, denies visual hallucinations, has no overt delusions noted.    She denies any side effects from medications.    HPI ROS Appetite Changes and Sleep:     She reports hypersomnia, increased appetite, low energy    Current Rating Scores:     None completed today.    Review Of Systems:      Constitutional negative   ENT negative   Cardiovascular negative   Respiratory negative   Gastrointestinal negative   Genitourinary negative   Musculoskeletal arthralgias   Integumentary negative   Neurological negative   Endocrine negative   Other Symptoms none, all other systems are negative     Past Psychiatric History: (unchanged information from previous note copied and updated)     Previous inpatient psychiatric admissions: denies.  Previous inpatient/outpatient substance abuse rehabilitation: denies.  Present/previous outpatient psychiatric treatment: denies.  Present/previous psychotherapy: In therapy.  History of suicidal attempts/gestures: Denies.  History of violence/aggressive behaviors: denies.  Present/previous psychotropic medication use: Celexa 40mg QD.     Traumatic History: (unchanged information from previous note copied and updated)     Abuse:emotional and verbal  Other Traumatic Events:  defer     Substance Abuse History:     Patient denies history of alcohol, illict substance, or tobacco abuse.     Kimberly TERRAZAS does not apear under the influence or withdrawal of any psychoactive substance throughout today's examination.      Social History:     Educational History:  Academic history: high school diploma/GED  Marital history:   Social support system: " parents, extended family, and friend(s)  Residential history: Resides in home; lives by herself  Vocational History: works as healthcare aid  Access to guns/weapons: none  Legal History: denies     Family Psychiatric History:      Suicide attempts in all three of her children; no hx of bipolar or schizophrenia in family     Past Medical History:    Past Medical History:   Diagnosis Date    Anxiety     Depression 2013    Heart murmur     Obesity 1979    S/p partial hysterectomy with remaining cervical stump 05/03/2018        Past Surgical History:   Procedure Laterality Date    TOTAL ABDOMINAL HYSTERECTOMY      TUBAL LIGATION      WISDOM TOOTH EXTRACTION       Allergies   Allergen Reactions    Effexor [Venlafaxine] Hives     Patient developed hives when taking this medication. Improved with stopping medication and returned when patient retried medication on her own.        Current Outpatient Medications:    Current Outpatient Medications   Medication Sig Dispense Refill    aspirin (ECOTRIN LOW STRENGTH) 81 mg EC tablet Take 81 mg by mouth      celecoxib (CeleBREX) 100 mg capsule Take 1 capsule (100 mg total) by mouth 2 (two) times a day 60 capsule 0    citalopram (CeleXA) 40 mg tablet Take 1 tablet (40 mg total) by mouth daily 30 tablet 2    DULoxetine (CYMBALTA) 20 mg capsule Take 1 capsule (20 mg total) by mouth 2 (two) times a day 60 capsule 2    ergocalciferol (VITAMIN D2) 50,000 units Take 1 capsule (50,000 Units total) by mouth once a week 12 capsule 5    hydrOXYzine HCL (ATARAX) 25 mg tablet Take 1 tablet (25 mg total) by mouth 2 (two) times a day as needed for itching or anxiety 14 tablet 0    levothyroxine 75 mcg tablet Take 1 tablet (75 mcg total) by mouth daily 90 tablet 3    meclizine (ANTIVERT) 25 mg tablet Take 1 tablet (25 mg total) by mouth 3 (three) times a day as needed for dizziness for up to 15 days 45 tablet 0    Multiple Vitamins-Minerals (MULTIVITAMIN ADULTS 50+) TABS Take by mouth       oxyCODONE (Roxicodone) 5 immediate release tablet Take 1 tablet (5 mg total) by mouth daily at bedtime as needed for moderate pain for up to 6 doses Max Daily Amount: 5 mg 6 tablet 0    rosuvastatin (CRESTOR) 10 MG tablet Take 1 tablet (10 mg total) by mouth daily 90 tablet 3     No current facility-administered medications for this visit.       History Review: The following portions of the patient's history were reviewed and updated as appropriate: allergies, current medications, past family history, past medical history, past social history, past surgical history, and problem list.       OBJECTIVE:     Vital signs in last 24 hours:    There were no vitals filed for this visit.    Mental Status Evaluation:    Appearance age appropriate, casually dressed   Behavior cooperative, mildly anxious   Speech normal rate, normal volume, normal pitch   Mood depressed, anxious   Affect constricted, tearful, mood-congruent   Thought Processes organized, goal directed   Associations intact associations   Thought Content no overt delusions   Perceptual Disturbances: no auditory hallucinations, no visual hallucinations   Abnormal Thoughts  Risk Potential Suicidal ideation - None  Homicidal ideation - None  Potential for aggression - No   Orientation oriented to person, place, time/date, and situation   Memory recent and remote memory grossly intact   Consciousness alert and awake   Attention Span Concentration Span attention span and concentration are age appropriate   Intellect appears to be of average intelligence   Insight intact   Judgement intact   Muscle Strength and  Gait normal muscle strength and normal muscle tone, normal gait and normal balance   Motor activity no abnormal movements   Language no difficulty naming common objects, no difficulty repeating a phrase, no difficulty writing a sentence   Fund of Knowledge adequate knowledge of current events  adequate fund of knowledge regarding past history  adequate fund of  knowledge regarding vocabulary    Pain none   Pain Scale 0       Laboratory Results: I have personally reviewed all pertinent laboratory/tests results    Recent Labs:   Appointment on 02/10/2025   Component Date Value    LDL Direct 02/10/2025 95     TSH 3RD JUDTON 02/10/2025 2.888        Suicide/Homicide Risk Assessment:    The following interventions are recommended: continue medication management. Although patient's acute lethality risk is low, long-term/chronic lethality risk is mildly elevated in the presence of MDD, KRISTIN. At the current moment, Kimberly TERRAZAS is future-oriented, forward-thinking, and demonstrates ability to act in a self-preserving manner as evidenced by volitionally presenting to the clinic today, seeking treatment.To mitigate future risk, patient should adhere to the recommendations of this writer, avoid alcohol/illicit substance use, utilize community-based resources and familiar support and prioritize mental health treatment.     Presently, patient denies suicidal/homicidal ideation in addition to thoughts of self-injury; contracts for safety, see below for risk assessment. At conclusion of evaluation, patient is amenable to the recommendations of this writer including: medication management and psychothreapy.  Also, patient is amenable to calling/contacting the outpatient office including this writer if any acute adverse effects of their medication regimen arise in addition to any comments or concerns pertaining to their psychiatric management.  Patient is amenable to calling/contacting crisis and/or attending to the nearest emergency department if their clinical condition deteriorates to assure their safety and stability, stating that they are able to appropriately confide in their psychiatrist and psychotherapist regarding their psychiatric state.     At this juncture, inpatient hospitalization is not currently warranted. The following interventions are recommended:   no intervention  changes    To mitigate future risk, patient should adhere to the recommendations of this writer, avoid alcohol/illicit substance use, utilize community-based resources and familiar support and prioritize mental health treatment.     Psychotherapy Provided:     Individual psychotherapy provided: Yes     Treatment Plan:    Completed and signed during the session: Yes - with Kimberly TERRAZAS    Note Share:    This note was not shared with the patient due to reasonable likelihood of causing patient harm    Visit Time    Visit Start Time: 0800 AM  Visit Stop Time: 0845 AM  Total Visit Duration:  45 minutes    Alex Espino MD 02/25/25

## 2025-02-25 NOTE — ASSESSMENT & PLAN NOTE
Discussed with the patient that:  - she is on the right track with her attempts to begin leading a healthy lifestyle  - referral to nutrition placed at time of visit for consultation  - continue with TOPS (take off weight sensibly) program that she is beginning on Friday 2/28/24      Orders:  •  Ambulatory Referral to Nutrition Services; Future

## 2025-02-25 NOTE — LETTER
February 25, 2025     Patient: Kimberly Montanez  YOB: 1960  Date of Visit: 2/25/2025      To Whom it May Concern:    Kimberly Montanez is under my professional care. Kimberly TERRAZAS was seen in my office on 2/25/2025. Kimberly TERRAZAS may return to work on 3/10/25 .    If you have any questions or concerns, please don't hesitate to call.         Sincerely,          Lloyd Cobos,         CC: No Recipients

## 2025-02-25 NOTE — PATIENT INSTRUCTIONS
Discussed weight loss planning with the patient as an option for the future if she decides that she would like to see weight management or nutrition.  Discussed appropriate proportions, measuring foods, and protein intake.

## 2025-03-04 ENCOUNTER — OFFICE VISIT (OUTPATIENT)
Age: 65
End: 2025-03-04

## 2025-03-04 ENCOUNTER — TELEPHONE (OUTPATIENT)
Dept: PSYCHIATRY | Facility: CLINIC | Age: 65
End: 2025-03-04

## 2025-03-04 ENCOUNTER — OFFICE VISIT (OUTPATIENT)
Dept: PHYSICAL THERAPY | Facility: HOME HEALTHCARE | Age: 65
End: 2025-03-04
Payer: COMMERCIAL

## 2025-03-04 DIAGNOSIS — M25.512 BILATERAL SHOULDER PAIN, UNSPECIFIED CHRONICITY: Primary | ICD-10-CM

## 2025-03-04 DIAGNOSIS — M25.511 BILATERAL SHOULDER PAIN, UNSPECIFIED CHRONICITY: Primary | ICD-10-CM

## 2025-03-04 DIAGNOSIS — F33.2 SEVERE EPISODE OF RECURRENT MAJOR DEPRESSIVE DISORDER, WITHOUT PSYCHOTIC FEATURES (HCC): Primary | ICD-10-CM

## 2025-03-04 DIAGNOSIS — M17.0 PRIMARY OSTEOARTHRITIS OF BOTH KNEES: ICD-10-CM

## 2025-03-04 DIAGNOSIS — F41.1 GAD (GENERALIZED ANXIETY DISORDER): ICD-10-CM

## 2025-03-04 PROCEDURE — 97110 THERAPEUTIC EXERCISES: CPT

## 2025-03-04 PROCEDURE — PBNCHG PB NO CHARGE PLACEHOLDER

## 2025-03-04 NOTE — TELEPHONE ENCOUNTER
Called the patient to set up spravato evaluation, patient stated that she was walking out the door and would call me back later to schedule.

## 2025-03-04 NOTE — PROGRESS NOTES
"Daily Note     Today's date: 3/4/2025  Patient name: Kimberly Montanez  : 1960  MRN: [id number]  Referring provider: Batsheva Gomes DO  Dx:   Encounter Diagnosis     ICD-10-CM    1. Bilateral shoulder pain, unspecified chronicity  M25.511     M25.512       2. Primary osteoarthritis of both knees  M17.0             Start Time: 0700  Stop Time: 0745  Total time in clinic (min): 45 minutes    Subjective: Pt reports her left knee is \"snapping\" more than usual. Very difficult to do any ADL's due to bilat shoulder pain.  Notes the pain keeps her awake at night. States she tries to do her arm HEP.         Objective: See treatment diary below      Assessment: Tolerated treatment fair. Patient benefits from VC's and encouragement t/o session to improve technique and outcomes. Patient is limited in bilat shoulder range and OH mobility during TE's due to pain and muscle weakness, R seems a little more challenging than L. Pt also continued to be limited with LE activities due to muscle weakness -leans heavily to R during standing hip abduction.  Patient would benefit from continued PT to address UE/LE deficits, improve symptoms, function and ADL's.        Plan: Continue per plan of care.            Visit Count 1 2 3        Manuals  34                                     Neuro Re-Ed                                                 Ther Ex        NuStep for ROM and conditioning   L1  5'  L1 5'     HR/TR  1x10 ea  1x10 ea     Standing hip flex   1x10 Luis Angel  1x10 luis angel     Standing hip abd   1x10 Luis Angel  1x10 luis angel     Seated hip add HEP  5\" 1x10   5\" 1x10     Seated LAQ HEP  5\" 1x5 Luis Angel  5\" 1x10 luis angel     Heel slides         QS         SAQ                 Shld shrugs  HEP  1x10  1x10     Retractions  HEP  3\" 1x10  3\" 1x10     Pulleys: flex/scap   Flex 5\" 1x10 ea Luis Angel  Flex 5\" 1x10  Ea Luis Angel     Elbow curls   1x10  1x10     Cane flex  Seated 2x10  Seated 2x10     Table slides   Flex 1x10 Luis Angel  Scap 1x6 Luis Angel Flex 1x10 luis angel  Scap    "                                           Ther Activity                        Gait Training                        Modalities

## 2025-03-04 NOTE — PSYCH
(No Charge--Indirect Supervision)  MEDICATION MANAGEMENT NOTE        [facility] - [facility]YCHIATRIC [facility]      Name and Date of Birth:  Kimberly Montanez 64 y.o. 1960 MRN: [id number]    Insurance: Payor: [employer] [facility] / Plan: [employer] [facility] / Product Type: [facility] /     Date of Visit: March 4, 2025    Reason for Visit: Medication Management -- Depression, Anxiety    Depressive symptoms remain consistent (anhedonia, low self esteem, depressed/irritable mood, low energy, hypersomnia). Patient with osteoarthritic pain that limits her ability to work and exercise. Unable to attend partial program due to part time work commitments that would interfere with her ability to participate in the program. Continues weekly psychotherapy. Patient's depression improved, but still persists, consistent with treatment resistant depression. She would be a good candidate for Esketamine therapy, and will refer patient to Dr Smith for review. Follow up in 2 weeks. No indication for inpatient psychiatric level of care at this time (patient without SI, Hi, AVH, overt delusional thought content, and/or decompensation in functioning).     Assessment & Plan  Severe episode of recurrent major depressive disorder, without psychotic features (HCC)  Celexa 40mg once per day   PARQ completed including serotonin syndrome, SIADH, worsening depression, suicidality, induction of nakul, GI upset, headaches, activation, sexual side effects, sedation, potential drug interactions, and others.   Cymbalta 20mg DR twice per day  PARQ completed including serotonin syndrome, SIADH, worsened depression/suicidality, induction of nakul, blood pressure changes and GI distress, weight gain, sexual side effects, insomnia, sedation, potential for drug interactions, and others.         KRISTIN (generalized anxiety disorder)  Celexa 40mg once per day   PARQ completed including serotonin syndrome, SIADH,  "worsening depression, suicidality, induction of nakul, GI upset, headaches, activation, sexual side effects, sedation, potential drug interactions, and others.   Cymbalta 20mg DR twice per day  PARQ completed including serotonin syndrome, SIADH, worsened depression/suicidality, induction of nakul, blood pressure changes and GI distress, weight gain, sexual side effects, insomnia, sedation, potential for drug interactions, and others.              Treatment Recommendations/Precautions:     Psychotropic Medication Regiment: Cymbalta 20mg DR BID, Celexa 40mg QD  Psychotherapy: In weekly psychotherapy  Next Appointment: 2 week follow up     Aware of 24 hour and weekend coverage for urgent situations accessed by calling [facility] main practice number  I am scheduling this patient out for greater than 3 months: No     Medications Risks/Benefits:       Risks, Benefits And Possible Side Effects Of Medications:     Risks, benefits, and possible side effects of medications explained to Kimberly TERRAZAS and she verbalizes understanding and agreement for treatment.     Controlled Medication Discussion:      Not applicable     SUBJECTIVE:     Kimberly TERRAZAS is seen today for a follow up for Major Depressive Disorder.     In the interval since her last evaluation, she continues to endorse persistent depressive symptoms (anhedonia, low energy, poor concentration, hypersomnia, low self esteem). She notes that she will be losing access to her therapist in one month and is feeling sad regarding losing access to her therapist of several years. She recently joined a weight management group but endorses some feelings of not belonging with this group because \"I'm so much heavier and have it worse than they do--I do not feel like I belong with them.\" She endorses her arthritic pain has improved since receiving her steroid injection and notes taht she will be able to return to work on 3/10/25 (also notes that she cannot do the " partial program because of her part time work schedule). Exploratory therapy with patient discussing her relationship conflict with her daughter Katrin in regards to her grand daughter Sydnie reveal projection and triangulation, and patient is able to tolerate coming to terms with her role potential relationship conflict with her daughter Katrin--appearing relieved to have discussed the matter. Agreeable to Esketamine therapy referral.     She denies suicidal ideation, intent or plan at present; denies homicidal ideation, intent or plan at present.     She denies auditory hallucinations, denies visual hallucinations, has no overt delusions noted.     She denies any side effects from medications.     HPI ROS Appetite Changes and Sleep:      She reports hypersomnia, increased appetite, low energy     Current Rating Scores:      None completed today.     Review Of Systems:      Constitutional negative   ENT negative   Cardiovascular negative   Respiratory negative   Gastrointestinal negative   Genitourinary negative   Musculoskeletal arthralgias   Integumentary negative   Neurological negative   Endocrine negative   Other Symptoms none, all other systems are negative      Past Psychiatric History: (unchanged information from previous note copied and updated)     Previous inpatient psychiatric admissions: denies.  Previous inpatient/outpatient substance abuse rehabilitation: denies.  Present/previous outpatient psychiatric treatment: denies.  Present/previous psychotherapy: In therapy.  History of suicidal attempts/gestures: Denies.  History of violence/aggressive behaviors: denies.  Present/previous psychotropic medication use: Celexa 40mg QD.     Traumatic History: (unchanged information from previous note copied and updated)     Abuse:emotional and verbal  Other Traumatic Events:  defer     Substance Abuse History:     Patient denies history of alcohol, illict substance, or tobacco abuse.     Kimberly TERRAZAS does not apear  under the influence or withdrawal of any psychoactive substance throughout today's examination.      Social History:     Educational History:  Academic history: high school diploma/GED  Marital history:   Social support system: parents, extended family, and friend(s)  Residential history: Resides in home; lives by herself  Vocational History: works as healthcare aid  Access to guns/weapons: none  Legal History: denies     Family Psychiatric History:      Suicide attempts in all three of her children; no hx of bipolar or schizophrenia in family     Past Medical History:    Past Medical History:   Diagnosis Date    Anxiety     Depression 2013    Disease of thyroid gland     Heart murmur     Obesity 1979    Osteoarthritis 2024    S/p partial hysterectomy with remaining cervical stump 05/03/2018        Past Surgical History:   Procedure Laterality Date    TOTAL ABDOMINAL HYSTERECTOMY      TUBAL LIGATION      WISDOM TOOTH EXTRACTION       Allergies   Allergen Reactions    Effexor [Venlafaxine] Hives     Patient developed hives when taking this medication. Improved with stopping medication and returned when patient retried medication on her own.        Current Outpatient Medications:    Current Outpatient Medications   Medication Sig Dispense Refill    aspirin (ECOTRIN LOW STRENGTH) 81 mg EC tablet Take 81 mg by mouth      celecoxib (CeleBREX) 100 mg capsule Take 1 capsule (100 mg total) by mouth 2 (two) times a day 60 capsule 0    citalopram (CeleXA) 40 mg tablet Take 1 tablet (40 mg total) by mouth daily 30 tablet 2    DULoxetine (CYMBALTA) 20 mg capsule Take 1 capsule (20 mg total) by mouth 2 (two) times a day 60 capsule 2    ergocalciferol (VITAMIN D2) 50,000 units Take 1 capsule (50,000 Units total) by mouth once a week 12 capsule 5    hydrOXYzine HCL (ATARAX) 25 mg tablet Take 1 tablet (25 mg total) by mouth 2 (two) times a day as needed for itching or anxiety 14 tablet 0    levothyroxine 75 mcg tablet Take  1 tablet (75 mcg total) by mouth daily 90 tablet 3    meclizine (ANTIVERT) 25 mg tablet Take 1 tablet (25 mg total) by mouth 3 (three) times a day as needed for dizziness for up to 15 days 45 tablet 0    Multiple Vitamins-Minerals (MULTIVITAMIN ADULTS 50+) TABS Take by mouth      oxyCODONE (Roxicodone) 5 immediate release tablet Take 1 tablet (5 mg total) by mouth daily at bedtime as needed for moderate pain for up to 6 doses Max Daily Amount: 5 mg 6 tablet 0    rosuvastatin (CRESTOR) 10 MG tablet Take 1 tablet (10 mg total) by mouth daily 90 tablet 3     No current facility-administered medications for this visit.       History Review: The following portions of the patient's history were reviewed and updated as appropriate: allergies, current medications, past family history, past medical history, past social history, past surgical history, and problem list.       OBJECTIVE:     Vital signs in last 24 hours:    There were no vitals filed for this visit.    Mental Status Evaluation:     Appearance age appropriate, casually dressed   Behavior cooperative, mildly anxious   Speech normal rate, normal volume, normal pitch   Mood depressed, anxious   Affect constricted, tearful, mood-congruent   Thought Processes organized, goal directed   Associations intact associations   Thought Content no overt delusions   Perceptual Disturbances: no auditory hallucinations, no visual hallucinations   Abnormal Thoughts  Risk Potential Suicidal ideation - None  Homicidal ideation - None  Potential for aggression - No   Orientation oriented to person, place, time/date, and situation   Memory recent and remote memory grossly intact   Consciousness alert and awake   Attention Span Concentration Span attention span and concentration are age appropriate   Intellect appears to be of average intelligence   Insight intact   Judgement intact   Muscle Strength and  Gait normal muscle strength and normal muscle tone, normal gait and normal  balance   Motor activity no abnormal movements   Language no difficulty naming common objects, no difficulty repeating a phrase, no difficulty writing a sentence   Fund of Knowledge adequate knowledge of current events  adequate fund of knowledge regarding past history  adequate fund of knowledge regarding vocabulary    Pain none   Pain Scale 0       Laboratory Results: I have personally reviewed all pertinent laboratory/tests results    Recent Labs:   Appointment on 02/10/2025   Component Date Value    LDL Direct 02/10/2025 95     TSH 3RD GENERATON 02/10/2025 2.888        Suicide/Homicide Risk Assessment:    The following interventions are recommended: continue medication management. Although patient's acute lethality risk is low, long-term/chronic lethality risk is mildly elevated in the presence of MDD. At the current moment, Kimberly TERRAZAS is future-oriented, forward-thinking, and demonstrates ability to act in a self-preserving manner as evidenced by volitionally presenting to the clinic today, seeking treatment.To mitigate future risk, patient should adhere to the recommendations of this writer, avoid alcohol/illicit substance use, utilize community-based resources and familiar support and prioritize mental health treatment.     Presently, patient denies suicidal/homicidal ideation in addition to thoughts of self-injury; contracts for safety, see below for risk assessment. At conclusion of evaluation, patient is amenable to the recommendations of this writer including: mediation management.  Also, patient is amenable to calling/contacting the outpatient office including this writer if any acute adverse effects of their medication regimen arise in addition to any comments or concerns pertaining to their psychiatric management.  Patient is amenable to calling/contacting crisis and/or attending to the nearest emergency department if their clinical condition deteriorates to assure their safety and stability, stating  that they are able to appropriately confide in their psychiatrist regarding their psychiatric state.     At this juncture, inpatient hospitalization is not currently warranted. The following interventions are recommended:   referral to esketamine therapy evaluation    To mitigate future risk, patient should adhere to the recommendations of this writer, avoid alcohol/illicit substance use, utilize community-based resources and familiar support and prioritize mental health treatment.     Psychotherapy Provided:     Individual psychotherapy provided: Yes     Treatment Plan:    Completed and signed during the session: Not applicable - Treatment Plan not due at this session    Note Share:    This note was not shared with the patient due to reasonable likelihood of causing patient harm    Visit Time    Visit Start Time: 10:40 AM  Visit Stop Time: 11:40 AM  Total Visit Duration:  60 minutes    Alex Espino MD 03/04/25

## 2025-03-06 ENCOUNTER — OFFICE VISIT (OUTPATIENT)
Dept: PHYSICAL THERAPY | Facility: HOME HEALTHCARE | Age: 65
End: 2025-03-06
Payer: COMMERCIAL

## 2025-03-06 DIAGNOSIS — M25.512 BILATERAL SHOULDER PAIN, UNSPECIFIED CHRONICITY: Primary | ICD-10-CM

## 2025-03-06 DIAGNOSIS — M17.0 PRIMARY OSTEOARTHRITIS OF BOTH KNEES: ICD-10-CM

## 2025-03-06 DIAGNOSIS — M25.511 BILATERAL SHOULDER PAIN, UNSPECIFIED CHRONICITY: Primary | ICD-10-CM

## 2025-03-06 PROCEDURE — 97110 THERAPEUTIC EXERCISES: CPT

## 2025-03-06 NOTE — PROGRESS NOTES
"Daily Note     Today's date: 3/6/2025  Patient name: Kimberly Montanez  : 1960  MRN: [id number]  Referring provider: Batsheva Gomes DO  Dx: No diagnosis found.    Start Time: 827    Subjective: Yesterday I did my dishes standing vs sitting and I did some cleaning around the house. My L knee is bothering me the most and it had been cracking a lot when I straighten it. Sometimes there is a grinding sensation in my L knee also.     Objective: See treatment diary below    Assessment: Pt with fairly good gustabo to session. VC's and encouragement provided to complete in correct form. Pt with report of pain in B sh and B knees with most pain in L knee. Pt reports consistency with HEP as able. Pt reports use of Ibuprofen for pain relief.  Patient would benefit from continued PT    Plan: Continue per plan of care.        Visit Count 1 2 3 4       Manuals 2/20 2/24 3/4 3-6                    Neuro Re-Ed     3-6                    Ther Ex    3-6    NuStep for ROM and conditioning   L1  5'  L1 5' L1  6'    HR/TR  1x10 ea  1x10 ea 1x10    Standing hip flex   1x10 Prem  1x10 prem 1x10 prem    Standing hip abd   1x10 Prem  1x10 prem 1x10 prem    Seated hip add HEP  5\" 1x10   5\" 1x10 5\" x10     Seated LAQ HEP  5\" 1x5 Prem  5\" 1x10 prem 5\" x10 prem    Heel slides         QS         SAQ                 Shld shrugs  HEP  1x10  1x10 1x10    Retractions  HEP  3\" 1x10  3\" 1x10 3\" x10    Pulleys: flex/scap   Flex 5\" 1x10 ea Prem  Flex 5\" 1x10  Ea Prem Flex 5\" x10 prem    Elbow curls   1x10  1x10 1x10    Cane flex  Seated 2x10  Seated 2x10 Seated 2x10    Table slides   Flex 1x10 Prem  Scap 1x6 Prem Flex 1x10 prem  Scap  Flex/scap 1x10 ea prem                                            Ther Activity                        Gait Training                        Modalities                                        "

## 2025-03-10 ENCOUNTER — OFFICE VISIT (OUTPATIENT)
Dept: PHYSICAL THERAPY | Facility: HOME HEALTHCARE | Age: 65
End: 2025-03-10
Payer: COMMERCIAL

## 2025-03-10 DIAGNOSIS — M25.511 BILATERAL SHOULDER PAIN, UNSPECIFIED CHRONICITY: Primary | ICD-10-CM

## 2025-03-10 DIAGNOSIS — M17.0 PRIMARY OSTEOARTHRITIS OF BOTH KNEES: ICD-10-CM

## 2025-03-10 DIAGNOSIS — M25.512 BILATERAL SHOULDER PAIN, UNSPECIFIED CHRONICITY: Primary | ICD-10-CM

## 2025-03-10 PROCEDURE — 97110 THERAPEUTIC EXERCISES: CPT

## 2025-03-10 NOTE — PROGRESS NOTES
"Daily Note     Today's date: 3/10/2025  Patient name: Kimberly Montanez  : 1960  MRN: [id number]  Referring provider: Batsheva Gomes DO  Dx:   Encounter Diagnosis     ICD-10-CM    1. Bilateral shoulder pain, unspecified chronicity  M25.511     M25.512       2. Primary osteoarthritis of both knees  M17.0                  Subjective: Pt reports her L arm is sore because yesterday she was playing with dog and her dog tugged on her toy pulling her L arm. Pt reports she has 6/10 pain Luis Angel knees.        Objective: See treatment diary below      Assessment: Tolerated treatment fair. Verbal cues needed for correct form with exercises. Pt reports 3/10 pain Luis Angel shoulders during exercises. Pt with no c/o increased pain Luis Angel knees t/o session or at end of session. Strength deficits evident Luis Angel UE/LE. Fatigue noted toward end of session. Patient would benefit from continued PT      Plan: Continue per plan of care.        Visit Count 1 2 3 4 5      Manuals 2/20 2/24 3/4 3-6 3/10                   Neuro Re-Ed     3-6                    Ther Ex    3-6    NuStep for ROM and conditioning   L1  5'  L1 5' L1  6' L2  6'   HR/TR  1x10 ea  1x10 ea 1x10 1x10 ea    Standing hip flex   1x10 Luis Angel  1x10 luis angel 1x10 luis angel 1x10 Luis Angel   Standing hip abd   1x10 Luis Angel  1x10 luis angel 1x10 luis angel 1x10 Luis Angel    Seated hip add HEP  5\" 1x10   5\" 1x10 5\" x10  5\" 2x10    Seated LAQ HEP  5\" 1x5 Luis Angel  5\" 1x10 luis angel 5\" x10 luis angel 5\" 1x10 Luis Angel   1x6 L  1x 6 R   Heel slides         QS         SAQ                 Shld shrugs  HEP  1x10  1x10 1x10 1x10    Retractions  HEP  3\" 1x10  3\" 1x10 3\" x10 3\"x10    Pulleys: flex/scap   Flex 5\" 1x10 ea Luis Angel  Flex 5\" 1x10  Ea Luis Angel Flex 5\" x10 luis angel Flex 5\"x10 Luis Angel   Elbow curls   1x10  1x10 1x10 1x10    Cane flex  Seated 2x10  Seated 2x10 Seated 2x10 Seated 2x10    Table slides   Flex 1x10 Luis Angel  Scap 1x6 Luis Angel Flex 1x10 luis angel  Scap  Flex/scap 1x10 ea luis angel Flex/scap  1x10 ea Luis Angel                                            Ther Activity                     "    Gait Training                        Modalities

## 2025-03-12 ENCOUNTER — TELEMEDICINE (OUTPATIENT)
Dept: PSYCHIATRY | Facility: CLINIC | Age: 65
End: 2025-03-12
Payer: COMMERCIAL

## 2025-03-12 DIAGNOSIS — F32.9 TREATMENT-RESISTANT DEPRESSION: Primary | ICD-10-CM

## 2025-03-12 DIAGNOSIS — F41.1 GAD (GENERALIZED ANXIETY DISORDER): ICD-10-CM

## 2025-03-12 DIAGNOSIS — F33.2 SEVERE EPISODE OF RECURRENT MAJOR DEPRESSIVE DISORDER, WITHOUT PSYCHOTIC FEATURES (HCC): ICD-10-CM

## 2025-03-12 PROBLEM — I10 ELEVATED BLOOD PRESSURE READING IN OFFICE WITH WHITE COAT SYNDROME, WITH DIAGNOSIS OF HYPERTENSION: Status: RESOLVED | Noted: 2018-05-24 | Resolved: 2025-03-12

## 2025-03-12 PROCEDURE — 99214 OFFICE O/P EST MOD 30 MIN: CPT | Performed by: STUDENT IN AN ORGANIZED HEALTH CARE EDUCATION/TRAINING PROGRAM

## 2025-03-12 PROCEDURE — 90833 PSYTX W PT W E/M 30 MIN: CPT | Performed by: STUDENT IN AN ORGANIZED HEALTH CARE EDUCATION/TRAINING PROGRAM

## 2025-03-12 NOTE — PSYCH
MEDICATION MANAGEMENT NOTE        [facility] - PSYCHIATRIC ASSOCIATES      Name and Date of Birth:  Kimberly Montanez 64 y.o. 1960 MRN: [id number]    Insurance: Payor: [employer] [facility] / Plan: [employer] [facility] / Product Type: [facility] /     Date of Visit: March 12, 2025    Reason for Visit: Evaluation for Spravato treatment    Administrative Statements   Encounter provider Maninder Smith MD    The Patient is located at Home and in the following state in which I hold an active license [place].    The patient was identified by name and date of birth. Kimberly Montanez was informed that this is a telemedicine visit and that the visit is being conducted through the Epic Embedded platform. She agrees to proceed..  My office door was closed. No one else was in the room.  She acknowledged consent and understanding of privacy and security of the video platform. The patient has agreed to participate and understands they can discontinue the visit at any time.    I have spent a total time of 30 minutes in caring for this patient on the day of the visit/encounter including Diagnostic results, Prognosis, Risks and benefits of tx options, Instructions for management, Patient and family education, Importance of tx compliance, Risk factor reductions, Impressions, Counseling / Coordination of care, Documenting in the medical record, Reviewing/placing orders in the medical record (including tests, medications, and/or procedures), Obtaining or reviewing history  , and Communicating with other healthcare professionals , not including the time spent for establishing the audio/video connection.       Depressive symptoms remain consistent (anhedonia, low self esteem, depressed/irritable mood, low energy, hypersomnia). Patient with osteoarthritic pain that limits her ability to work and exercise. Continues weekly psychotherapy. Patient's depression still persists,  consistent with treatment resistant depression. She would be a good candidate for Esketamine therapy, we will proceed with this 56 mg twice a week which is FDA approved indication for Spravato for treatment of resistant depression.  No contraindications for the treatment and patient does not have a history of substance use and no evidence of liver impairment or aneurysmal dilatation.  Blood pressure has been controlled patient is aware of the side effects including sedation and dissociation  Assessment & Plan  Treatment-resistant depression    Orders:    Esketamine HCl, 56 MG Dose, (SPRAVATO) 28 MG/DEVICE nasal spray; 2 sprays by Intranasal route 2 (two) times a week for 15 days         Treatment Recommendations/Precautions:  Start on Spravato 56 mg twice a week once a prior authorization is completed and medication is delivered  Psychotropic Medication Regiment: Cymbalta 20mg DR BID, Celexa 40mg QD  Psychotherapy: In weekly psychotherapy     Aware of 24 hour and weekend coverage for urgent situations accessed by calling [facility] main practice number  I am scheduling this patient out for greater than 3 months: No     Medications Risks/Benefits:       Risks, Benefits And Possible Side Effects Of Medications:     Risks, benefits, and possible side effects of medications explained to Kimberly TERRAZAS and she verbalizes understanding and agreement for treatment.     Controlled Medication Discussion:      Not applicable     SUBJECTIVE:     Kimberly TERRAZAS is seen today for a for Major Depressive Disorder.  She reports having depression for long years without significant improvement on current treatment.  She has been trying multiple medications including Celexa, Cymbalta and Effexor with limited response.  She continued to struggle with lack of energy and motivation and not enjoying her life.  She also reports disturbed appetite and sleep.        Per chart review:  she continues to endorse persistent depressive  "symptoms (anhedonia, low energy, poor concentration, hypersomnia, low self esteem). She notes that she will be losing access to her therapist in one month and is feeling sad regarding losing access to her therapist of several years. She recently joined a weight management group but endorses some feelings of not belonging with this group because \"I'm so much heavier and have it worse than they do--I do not feel like I belong with them.\" She endorses her arthritic pain has improved since receiving her steroid injection and notes taht she will be able to return to work on 3/10/25 (also notes that she cannot do the partial program because of her part time work schedule). Exploratory therapy with patient discussing her relationship conflict with her daughter Katrin in regards to her grand daughter Sydnie reveal projection and triangulation, and patient is able to tolerate coming to terms with her role potential relationship conflict with her daughter Katrin--appearing relieved to have discussed the matter. Agreeable to Esketamine therapy referral.     She denies suicidal ideation, intent or plan at present; denies homicidal ideation, intent or plan at present.     She denies auditory hallucinations, denies visual hallucinations, has no overt delusions noted.     She denies any side effects from medications.     HPI ROS Appetite Changes and Sleep:      She reports hypersomnia, increased appetite, low energy     Current Rating Scores:      PHQ-2/9 Depression Screening    Little interest or pleasure in doing things: 3 - nearly every day  Feeling down, depressed, or hopeless: 3 - nearly every day  Trouble falling or staying asleep, or sleeping too much: 3 - nearly every day  Feeling tired or having little energy: 3 - nearly every day  Poor appetite or overeatin - more than half the days  Feeling bad about yourself - or that you are a failure or have let yourself or your family down: 3 - nearly every day  Trouble " concentrating on things, such as reading the newspaper or watching television: 2 - more than half the days  Moving or speaking so slowly that other people could have noticed. Or the opposite - being so fidgety or restless that you have been moving around a lot more than usual: 2 - more than half the days  Thoughts that you would be better off dead, or of hurting yourself in some way: 0 - not at all  PHQ-9 Score: 21  PHQ-9 Interpretation: Severe depression           Review Of Systems:      Constitutional negative   ENT negative   Cardiovascular negative   Respiratory negative   Gastrointestinal negative   Genitourinary negative   Musculoskeletal arthralgias   Integumentary negative   Neurological negative   Endocrine negative   Other Symptoms none, all other systems are negative      Past Psychiatric History: (unchanged information from previous note copied and updated)     Previous inpatient psychiatric admissions: denies.  Previous inpatient/outpatient substance abuse rehabilitation: denies.  Present/previous outpatient psychiatric treatment: Dr. Castillo   Present/previous psychotherapy: In therapy.  History of suicidal attempts/gestures: Denies.  History of violence/aggressive behaviors: denies.  Present/previous psychotropic medication use: Celexa 40mg QD. Cymbalta, Effexor      Traumatic History: (unchanged information from previous note copied and updated)     Abuse:emotional and verbal  Other Traumatic Events:  defer     Substance Abuse History:     Patient denies history of alcohol, illict substance, or tobacco abuse.     Kimberly TERRAZAS does not apear under the influence or withdrawal of any psychoactive substance throughout today's examination.      Social History:     Educational History:  Academic history: high school diploma/GED  Marital history:   Social support system: parents, extended family, and friend(s)  Residential history: Resides in home; lives by herself  Vocational History: works as  healthcare aid  Access to guns/weapons: none  Legal History: denies     Family Psychiatric History:      Suicide attempts in all three of her children; no hx of bipolar or schizophrenia in family     Past Medical History:    Past Medical History:   Diagnosis Date    Anxiety     Depression 2013    Disease of thyroid gland     Heart murmur     Obesity 1979    Osteoarthritis 2024    S/p partial hysterectomy with remaining cervical stump 05/03/2018        Past Surgical History:   Procedure Laterality Date    TOTAL ABDOMINAL HYSTERECTOMY      TUBAL LIGATION      WISDOM TOOTH EXTRACTION       Allergies   Allergen Reactions    Effexor [Venlafaxine] Hives     Patient developed hives when taking this medication. Improved with stopping medication and returned when patient retried medication on her own.        Current Outpatient Medications:    Current Outpatient Medications   Medication Sig Dispense Refill    aspirin (ECOTRIN LOW STRENGTH) 81 mg EC tablet Take 81 mg by mouth      celecoxib (CeleBREX) 100 mg capsule Take 1 capsule (100 mg total) by mouth 2 (two) times a day 60 capsule 0    citalopram (CeleXA) 40 mg tablet Take 1 tablet (40 mg total) by mouth daily 30 tablet 2    DULoxetine (CYMBALTA) 20 mg capsule Take 1 capsule (20 mg total) by mouth 2 (two) times a day 60 capsule 2    ergocalciferol (VITAMIN D2) 50,000 units Take 1 capsule (50,000 Units total) by mouth once a week 12 capsule 5    hydrOXYzine HCL (ATARAX) 25 mg tablet Take 1 tablet (25 mg total) by mouth 2 (two) times a day as needed for itching or anxiety 14 tablet 0    levothyroxine 75 mcg tablet Take 1 tablet (75 mcg total) by mouth daily 90 tablet 3    Multiple Vitamins-Minerals (MULTIVITAMIN ADULTS 50+) TABS Take by mouth      rosuvastatin (CRESTOR) 10 MG tablet Take 1 tablet (10 mg total) by mouth daily 90 tablet 3    meclizine (ANTIVERT) 25 mg tablet Take 1 tablet (25 mg total) by mouth 3 (three) times a day as needed for dizziness for up to 15 days 45  tablet 0    oxyCODONE (Roxicodone) 5 immediate release tablet Take 1 tablet (5 mg total) by mouth daily at bedtime as needed for moderate pain for up to 6 doses Max Daily Amount: 5 mg (Patient not taking: Reported on 3/12/2025) 6 tablet 0     No current facility-administered medications for this visit.       History Review: The following portions of the patient's history were reviewed and updated as appropriate: allergies, current medications, past family history, past medical history, past social history, past surgical history, and problem list.       OBJECTIVE:     Vital signs in last 24 hours:    There were no vitals filed for this visit.    Mental Status Evaluation:     Appearance age appropriate, casually dressed   Behavior cooperative, mildly anxious   Speech normal rate, normal volume, normal pitch   Mood depressed, anxious   Affect constricted, tearful, mood-congruent   Thought Processes organized, goal directed   Associations intact associations   Thought Content no overt delusions   Perceptual Disturbances: no auditory hallucinations, no visual hallucinations   Abnormal Thoughts  Risk Potential Suicidal ideation - None  Homicidal ideation - None  Potential for aggression - No   Orientation oriented to person, place, time/date, and situation   Memory recent and remote memory grossly intact   Consciousness alert and awake   Attention Span Concentration Span attention span and concentration are age appropriate   Intellect appears to be of average intelligence   Insight intact   Judgement intact   Muscle Strength and  Gait normal muscle strength and normal muscle tone, normal gait and normal balance   Motor activity no abnormal movements   Language no difficulty naming common objects, no difficulty repeating a phrase, no difficulty writing a sentence   Fund of Knowledge adequate knowledge of current events  adequate fund of knowledge regarding past history  adequate fund of knowledge regarding vocabulary     Pain none   Pain Scale 0       Laboratory Results: I have personally reviewed all pertinent laboratory/tests results    Recent Labs:   No visits with results within 1 Month(s) from this visit.   Latest known visit with results is:   Appointment on 02/10/2025   Component Date Value    LDL Direct 02/10/2025 95     TSH 3RD JUDTON 02/10/2025 2.888        Suicide/Homicide Risk Assessment:    The following interventions are recommended: continue medication management. Although patient's acute lethality risk is low, long-term/chronic lethality risk is mildly elevated in the presence of MDD. At the current moment, Kimberly TERRAZAS is future-oriented, forward-thinking, and demonstrates ability to act in a self-preserving manner as evidenced by volitionally presenting to the clinic today, seeking treatment.To mitigate future risk, patient should adhere to the recommendations of this writer, avoid alcohol/illicit substance use, utilize community-based resources and familiar support and prioritize mental health treatment.     Presently, patient denies suicidal/homicidal ideation in addition to thoughts of self-injury; contracts for safety, see below for risk assessment. At conclusion of evaluation, patient is amenable to the recommendations of this writer including: mediation management.  Also, patient is amenable to calling/contacting the outpatient office including this writer if any acute adverse effects of their medication regimen arise in addition to any comments or concerns pertaining to their psychiatric management.  Patient is amenable to calling/contacting crisis and/or attending to the nearest emergency department if their clinical condition deteriorates to assure their safety and stability, stating that they are able to appropriately confide in their psychiatrist regarding their psychiatric state.     At this juncture, inpatient hospitalization is not currently warranted. The following interventions are recommended:    no intervention changes    To mitigate future risk, patient should adhere to the recommendations of this writer, avoid alcohol/illicit substance use, utilize community-based resources and familiar support and prioritize mental health treatment.     Psychotherapy Provided:     Individual psychotherapy provided: Yes  Counseling was provided during the session today for 16 minutes.  Medications, treatment progress and treatment plan reviewed with Kimberly JUNIOR  Medication changes discussed with Kimberly JUNIOR  Medication education provided to Kimberly JUNIOR     Treatment Plan:    Completed and signed during the session: Not applicable - Treatment Plan not due at this session    Note Share:    This note was not shared with the patient due to reasonable likelihood of causing patient harm    Visit Time    Visit Start Time: 3:30 pm  Visit Stop Time: 4:00 pm  Total Visit Duration:  30 minutes    Maninder Smith MD 03/12/25

## 2025-03-13 ENCOUNTER — TELEPHONE (OUTPATIENT)
Dept: PSYCHIATRY | Facility: CLINIC | Age: 65
End: 2025-03-13

## 2025-03-13 ENCOUNTER — OFFICE VISIT (OUTPATIENT)
Dept: PHYSICAL THERAPY | Facility: HOME HEALTHCARE | Age: 65
End: 2025-03-13
Payer: COMMERCIAL

## 2025-03-13 DIAGNOSIS — M25.511 BILATERAL SHOULDER PAIN, UNSPECIFIED CHRONICITY: Primary | ICD-10-CM

## 2025-03-13 DIAGNOSIS — M25.512 BILATERAL SHOULDER PAIN, UNSPECIFIED CHRONICITY: Primary | ICD-10-CM

## 2025-03-13 DIAGNOSIS — M17.0 PRIMARY OSTEOARTHRITIS OF BOTH KNEES: ICD-10-CM

## 2025-03-13 PROCEDURE — 97110 THERAPEUTIC EXERCISES: CPT

## 2025-03-13 NOTE — TELEPHONE ENCOUNTER
Called the patient to see if she wanted me to email her the forms for Spravato or if she wants to fill out paperwork when she comes to the office.

## 2025-03-13 NOTE — PROGRESS NOTES
"Daily Note     Today's date: 3/13/2025  Patient name: Kimberly Montanez  : 1960  MRN: [id number]  Referring provider: Batsheva Gomes DO  Dx:   Encounter Diagnosis     ICD-10-CM    1. Bilateral shoulder pain, unspecified chronicity  M25.511     M25.512       2. Primary osteoarthritis of both knees  M17.0                  Subjective: Pt reports her L arm is still sore from her pulling her L arm the other day when her dog was tugging on her toy. Pt reports she has pain in her L knee.       Objective: See treatment diary below      Assessment: Tolerated treatment fair. Progressed to increased reps with most exercises today. Verbal cues needed for correct form with exercises. Pt reports pain Luis Angel shoulders and Luis Angel knees t/o exercises. Patient would benefit from continued PT      Plan: Continue per plan of care.        Visit Count 6 2 3 4 5      Manuals 3/13 2/24 3/4 3-6 3/10                   Neuro Re-Ed     3-6                    Ther Ex    3-6    NuStep for ROM and conditioning  L1  6' L1  5'  L1 5' L1  6' L2  6'   HR/TR 1x15 ea  1x10 ea  1x10 ea 1x10 1x10 ea    Standing hip flex  1x15 Luis Angel  1x10 Luis Angel  1x10 luis angel 1x10 luis angel 1x10 Luis Angel   Standing hip abd  1x15 Luis Angel  1x10 Luis Angel  1x10 luis angel 1x10 luis angel 1x10 Luis Angel    Seated hip add 5\" 2x10  5\" 1x10   5\" 1x10 5\" x10  5\" 2x10    Seated LAQ 5\" 2x10 Luis Angel  5\" 1x5 Luis Angel  5\" 1x10 luis angel 5\" x10 luis angel 5\" 1x10 Luis Angel   1x6 L  1x 6 R   Heel slides         QS         SAQ                 Shld shrugs  1x15  1x10  1x10 1x10 1x10    Retractions  3\" 1x15  3\" 1x10  3\" 1x10 3\" x10 3\"x10    Pulleys: flex/scap  Flex  5\"x 15 Luis Angel  Flex 5\" 1x10 ea Luis Angel  Flex 5\" 1x10  Ea Luis Angel Flex 5\" x10 luis angel Flex 5\"x10 Luis Angel   Elbow curls  1x15  1x10  1x10 1x10 1x10    Cane flex 1x20 seated  Seated 2x10  Seated 2x10 Seated 2x10 Seated 2x10    Table slides  Flex/scap  1x15 ea Luis Angel    Flex 1x10 Luis Angel  Scap 1x6 Luis Angel Flex 1x10 luis angel  Scap  Flex/scap 1x10 ea luis angel Flex/scap  1x10 ea Luis Angel                                            Ther Activity         "                Gait Training                        Modalities

## 2025-03-17 ENCOUNTER — OFFICE VISIT (OUTPATIENT)
Dept: PHYSICAL THERAPY | Facility: HOME HEALTHCARE | Age: 65
End: 2025-03-17
Payer: COMMERCIAL

## 2025-03-17 DIAGNOSIS — M17.0 PRIMARY OSTEOARTHRITIS OF BOTH KNEES: ICD-10-CM

## 2025-03-17 DIAGNOSIS — M25.512 BILATERAL SHOULDER PAIN, UNSPECIFIED CHRONICITY: ICD-10-CM

## 2025-03-17 DIAGNOSIS — M25.512 BILATERAL SHOULDER PAIN, UNSPECIFIED CHRONICITY: Primary | ICD-10-CM

## 2025-03-17 DIAGNOSIS — M25.511 BILATERAL SHOULDER PAIN, UNSPECIFIED CHRONICITY: ICD-10-CM

## 2025-03-17 DIAGNOSIS — M25.511 BILATERAL SHOULDER PAIN, UNSPECIFIED CHRONICITY: Primary | ICD-10-CM

## 2025-03-17 PROCEDURE — 97110 THERAPEUTIC EXERCISES: CPT

## 2025-03-17 RX ORDER — CELECOXIB 100 MG/1
100 CAPSULE ORAL 2 TIMES DAILY
Qty: 60 CAPSULE | Refills: 2 | Status: SHIPPED | OUTPATIENT
Start: 2025-03-17

## 2025-03-17 NOTE — PROGRESS NOTES
"Daily Note     Today's date: 3/17/2025  Patient name: Kimberly Montanez  : 1960  MRN: [id number]  Referring provider: Batsheva Gomes DO  Dx:   Encounter Diagnosis     ICD-10-CM    1. Bilateral shoulder pain, unspecified chronicity  M25.511     M25.512       2. Primary osteoarthritis of both knees  M17.0                      Subjective: Pt reports she has pain in her Luis Angel shoulders and her knees aren't as bad today.       Objective: See treatment diary below      Assessment: Tolerated treatment fair. Verbal cues needed for correct form with exercises. Pt was able to self increase reps with shrugs, retractions and elbow curls today. Pt reports pain Luis Angel shoulders and Luis Angel knees t/o exercises. Strength deficits evident Luis Angel UE/LE. Patient would benefit from continued PT      Plan: Continue per plan of care.        Visit Count 6 7 3 4 5      Manuals 3/13 3/17 3 3-6 3/10                   Neuro Re-Ed     3-6                    Ther Ex    3-6    NuStep for ROM and conditioning  L1  6' L1  7' L1 5' L1  6' L2  6'   HR/TR 1x15 ea  1x15 ea  1x10 ea 1x10 1x10 ea    Standing hip flex  1x15 Luis Angel  1x15 Luis Angel  1x10 luis angel 1x10 luis angel 1x10 Luis Angel   Standing hip abd  1x15 Luis Angel  1x15 Luis Angel  1x10 luis angel 1x10 luis angel 1x10 Luis Angel    Seated hip add 5\" 2x10  5\" 2x10   5\" 1x10 5\" x10  5\" 2x10    Seated LAQ 5\" 2x10 Luis Angel  5\" 1x15 Luis Angel  5\" 1x10 luis angel 5\" x10 luis angel 5\" 1x10 Luis Angel   1x6 L  1x 6 R   Heel slides         QS         SAQ                 Shld shrugs  1x15  1x20  1x10 1x10 1x10    Retractions  3\" 1x15  3\" 1x 20 3\" 1x10 3\" x10 3\"x10    Pulleys: flex/scap  Flex  5\"x 15 Luis Angel  Flex 5\" 1x15 ea Luis Angel  Flex 5\" 1x10  Ea Luis Angel Flex 5\" x10 luis angel Flex 5\"x10 Luis Angel   Elbow curls  1x15  1x 20 1x10 1x10 1x10    Cane flex 1x20 seated  Seated 2x10  Seated 2x10 Seated 2x10 Seated 2x10    Table slides  Flex/scap  1x15 ea Luis Angel    Flex 1x15 Luis Angel  Scap 1x6 Luis Angel Flex 1x10 luis angel  Scap  Flex/scap 1x10 ea luis angel Flex/scap  1x10 ea Luis Angel                                            Ther Activity              "           Gait Training                        Modalities

## 2025-03-18 ENCOUNTER — OFFICE VISIT (OUTPATIENT)
Age: 65
End: 2025-03-18

## 2025-03-18 DIAGNOSIS — F41.9 ANXIETY: ICD-10-CM

## 2025-03-18 DIAGNOSIS — F43.21 GRIEF: ICD-10-CM

## 2025-03-18 DIAGNOSIS — F41.1 GAD (GENERALIZED ANXIETY DISORDER): ICD-10-CM

## 2025-03-18 DIAGNOSIS — F33.2 SEVERE EPISODE OF RECURRENT MAJOR DEPRESSIVE DISORDER, WITHOUT PSYCHOTIC FEATURES (HCC): ICD-10-CM

## 2025-03-18 PROCEDURE — PBNCHG PB NO CHARGE PLACEHOLDER

## 2025-03-18 RX ORDER — DULOXETIN HYDROCHLORIDE 20 MG/1
20 CAPSULE, DELAYED RELEASE ORAL 2 TIMES DAILY
Qty: 60 CAPSULE | Refills: 2 | Status: SHIPPED | OUTPATIENT
Start: 2025-03-18 | End: 2025-06-16

## 2025-03-18 RX ORDER — CITALOPRAM HYDROBROMIDE 40 MG/1
40 TABLET ORAL DAILY
Qty: 30 TABLET | Refills: 2 | Status: SHIPPED | OUTPATIENT
Start: 2025-03-18 | End: 2025-06-16

## 2025-03-18 NOTE — PSYCH
(Indirect Supervision -- No Charge)  MEDICATION MANAGEMENT NOTE        [facility]      Name and Date of Birth:  Kimberly Montanez 64 y.o. 1960 MRN: [id number]    Insurance: Payor: [facility] / Plan: [employer] [facility] / Product Type: [facility] /     Date of Visit: March 18, 2025    Reason for Visit: Medication Management      No medication changes. Forms filled out for eskeatmine treatment.  Transitioning therapists. Mood is improved after sustained social engagement, brighter cheerful affect, lost 8 lbs. Consider atypical MDD in the presence of underlying borderline organization. Follow up in 2-3 weeks.     Assessment & Plan  Severe episode of recurrent major depressive disorder, without psychotic features (HCC)    Celexa 40mg once per day   PARQ completed including serotonin syndrome, SIADH, worsening depression, suicidality, induction of nakul, GI upset, headaches, activation, sexual side effects, sedation, potential drug interactions, and others.   Cymbalta 20mg DR twice per day  PARQ completed including serotonin syndrome, SIADH, worsened depression/suicidality, induction of nakul, blood pressure changes and GI distress, weight gain, sexual side effects, insomnia, sedation, potential for drug interactions, and others.      KRISTIN (generalized anxiety disorder)    Celexa 40mg once per day   PARQ completed including serotonin syndrome, SIADH, worsening depression, suicidality, induction of nakul, GI upset, headaches, activation, sexual side effects, sedation, potential drug interactions, and others.   Cymbalta 20mg DR twice per day  PARQ completed including serotonin syndrome, SIADH, worsened depression/suicidality, induction of nakul, blood pressure changes and GI distress, weight gain, sexual side effects, insomnia, sedation, potential for drug interactions, and others.        Treatment  Recommendations/Precautions:    Psychotropic Medication Regiment: Celexa 40mg QD, Cymbalta 20mg BID  Psychotherapy: Restarting psychotherapy at [facility]  Next Appointment: 3 weeks in [place]     Aware of 24 hour and weekend coverage for urgent situations accessed by calling [facility] main practice number  I am scheduling this patient out for greater than 3 months: No    Medications Risks/Benefits:      Risks, Benefits And Possible Side Effects Of Medications:    Risks, benefits, and possible side effects of medications explained to Kimberly TERRAZAS and she verbalizes understanding and agreement for treatment.    Controlled Medication Discussion:     Not applicable    SUBJECTIVE:    Kimberly TERRAZAS is seen today for a follow up for Major Depressive Disorder and Generalized Anxiety Disorder.     In the two week interval since her last evaluation, Karen reports feeling quite a bit better in regards to her mood. She spent this past weekend with her grand-daughter Sydnie, and recently lost 8 lbs at her weight management group. She also attended grief counseling for her father and reports it being a positive experience. Her affect is cheerful and brighter than in previous sessions, noting improvement in all depressive symptoms. She reports feeling uncertain if this will last, but is willing to maintain consistent social engagement even if she does not feel inclined to leave her home. Supportive therapy with patient appears effective. We will out paperwork for her to receive esketamine therapy starting in April with Dr Smith.     She denies suicidal ideation, intent or plan at present; denies homicidal ideation, intent or plan at present.     She denies auditory hallucinations, denies visual hallucinations, has no overt delusions noted.     She denies any side effects from medications.     HPI ROS Appetite Changes and Sleep:      She reports hypersomnia, increased appetite, low energy     Current Rating Scores:       None completed today.     Review Of Systems:      Constitutional negative   ENT negative   Cardiovascular negative   Respiratory negative   Gastrointestinal negative   Genitourinary negative   Musculoskeletal arthralgias   Integumentary negative   Neurological negative   Endocrine negative   Other Symptoms none, all other systems are negative      Past Psychiatric History: (unchanged information from previous note copied and updated)     Previous inpatient psychiatric admissions: denies.  Previous inpatient/outpatient substance abuse rehabilitation: denies.  Present/previous outpatient psychiatric treatment: denies.  Present/previous psychotherapy: In therapy.  History of suicidal attempts/gestures: Denies.  History of violence/aggressive behaviors: denies.  Present/previous psychotropic medication use: Celexa 40mg QD.     Traumatic History: (unchanged information from previous note copied and updated)     Abuse:emotional and verbal  Other Traumatic Events:  defer     Substance Abuse History:     Patient denies history of alcohol, illict substance, or tobacco abuse.     Kimberly TERRAZAS does not apear under the influence or withdrawal of any psychoactive substance throughout today's examination.      Social History:     Educational History:  Academic history: high school diploma/GED  Marital history:   Social support system: parents, extended family, and friend(s)  Residential history: Resides in home; lives by herself  Vocational History: works as healthcare aid  Access to guns/weapons: none  Legal History: denies     Family Psychiatric History:      Suicide attempts in all three of her children; no hx of bipolar or schizophrenia in family        Past Medical History:    Past Medical History:   Diagnosis Date    Anxiety     Depression 2013    Disease of thyroid gland     Heart murmur     Obesity 1979    Osteoarthritis 2024    S/p partial hysterectomy with remaining cervical stump 05/03/2018        Past Surgical  History:   Procedure Laterality Date    TOTAL ABDOMINAL HYSTERECTOMY      TUBAL LIGATION      WISDOM TOOTH EXTRACTION       Allergies   Allergen Reactions    Effexor [Venlafaxine] Hives     Patient developed hives when taking this medication. Improved with stopping medication and returned when patient retried medication on her own.        Current Outpatient Medications:    Current Outpatient Medications   Medication Sig Dispense Refill    citalopram (CeleXA) 40 mg tablet Take 1 tablet (40 mg total) by mouth daily 30 tablet 2    DULoxetine (CYMBALTA) 20 mg capsule Take 1 capsule (20 mg total) by mouth 2 (two) times a day 60 capsule 2    aspirin (ECOTRIN LOW STRENGTH) 81 mg EC tablet Take 81 mg by mouth      celecoxib (CeleBREX) 100 mg capsule Take 1 capsule (100 mg total) by mouth 2 (two) times a day 60 capsule 2    ergocalciferol (VITAMIN D2) 50,000 units Take 1 capsule (50,000 Units total) by mouth once a week 12 capsule 5    Esketamine HCl, 56 MG Dose, (SPRAVATO) 28 MG/DEVICE nasal spray 2 sprays by Intranasal route 2 (two) times a week for 15 days      levothyroxine 75 mcg tablet Take 1 tablet (75 mcg total) by mouth daily 90 tablet 3    meclizine (ANTIVERT) 25 mg tablet Take 1 tablet (25 mg total) by mouth 3 (three) times a day as needed for dizziness for up to 15 days 45 tablet 0    Multiple Vitamins-Minerals (MULTIVITAMIN ADULTS 50+) TABS Take by mouth      rosuvastatin (CRESTOR) 10 MG tablet Take 1 tablet (10 mg total) by mouth daily 90 tablet 3     No current facility-administered medications for this visit.       History Review: The following portions of the patient's history were reviewed and updated as appropriate: allergies, current medications, past family history, past medical history, past social history, past surgical history, and problem list.       OBJECTIVE:     Vital signs in last 24 hours:    There were no vitals filed for this visit.    Mental Status Evaluation:    Appearance age appropriate,  casually dressed   Behavior cooperative, mildly anxious   Speech normal rate, normal volume, normal pitch   Mood improved   Affect normal range and intensity, appropriate   Thought Processes organized, logical, coherent, goal directed, linear, normal rate of thoughts, normal abstract reasoning   Associations intact associations   Thought Content no overt delusions   Perceptual Disturbances: no auditory hallucinations, no visual hallucinations   Abnormal Thoughts  Risk Potential Suicidal ideation - None  Homicidal ideation - None  Potential for aggression - No   Orientation oriented to person, place, time/date, and situation   Memory recent and remote memory grossly intact   Consciousness alert and awake   Attention Span Concentration Span attention span and concentration are age appropriate   Intellect appears to be of average intelligence   Insight intact   Judgement intact   Muscle Strength and  Gait normal muscle strength and normal muscle tone, normal gait and normal balance   Motor activity no abnormal movements   Language no difficulty naming common objects, no difficulty repeating a phrase, no difficulty writing a sentence   Fund of Knowledge adequate knowledge of current events  adequate fund of knowledge regarding past history  adequate fund of knowledge regarding vocabulary    Pain none   Pain Scale 0       Laboratory Results: I have personally reviewed all pertinent laboratory/tests results    Recent Labs:   No visits with results within 1 Month(s) from this visit.   Latest known visit with results is:   Appointment on 02/10/2025   Component Date Value    LDL Direct 02/10/2025 95     TSH [address] [place] [address] 02/10/2025 2.888        Suicide/Homicide Risk Assessment:    The following interventions are recommended: continue medication management. Although patient's acute lethality risk is low, long-term/chronic lethality risk is mildly elevated in the presence of MDD, KRISTIN. At the current moment, Kimberly abdalla  future-oriented, forward-thinking, and demonstrates ability to act in a self-preserving manner as evidenced by volitionally presenting to the clinic today, seeking treatment.To mitigate future risk, patient should adhere to the recommendations of this writer, avoid alcohol/illicit substance use, utilize community-based resources and familiar support and prioritize mental health treatment.     Presently, patient denies suicidal/homicidal ideation in addition to thoughts of self-injury; contracts for safety, see below for risk assessment. At conclusion of evaluation, patient is amenable to the recommendations of this writer including: medication and psychotherapy.  Also, patient is amenable to calling/contacting the outpatient office including this writer if any acute adverse effects of their medication regimen arise in addition to any comments or concerns pertaining to their psychiatric management.  Patient is amenable to calling/contacting crisis and/or attending to the nearest emergency department if their clinical condition deteriorates to assure their safety and stability, stating that they are able to appropriately confide in their psychiatrist regarding their psychiatric state.     At this juncture, inpatient hospitalization is not currently warranted. The following interventions are recommended:   no intervention changes    To mitigate future risk, patient should adhere to the recommendations of this writer, avoid alcohol/illicit substance use, utilize community-based resources and familiar support and prioritize mental health treatment.     Psychotherapy Provided:     Individual psychotherapy provided: Yes     Treatment Plan:    Completed and signed during the session: Not applicable - Treatment Plan not due at this session    Note Share:    This note was not shared with the patient due to reasonable likelihood of causing patient harm    Visit Time    Visit Start Time: 11:00 PM  Visit Stop Time: 11:30  PM  Total Visit Duration:  30 minutes    Alex Espino MD 03/18/25

## 2025-03-20 ENCOUNTER — OFFICE VISIT (OUTPATIENT)
Dept: PHYSICAL THERAPY | Facility: HOME HEALTHCARE | Age: 65
End: 2025-03-20
Payer: COMMERCIAL

## 2025-03-20 DIAGNOSIS — M17.0 PRIMARY OSTEOARTHRITIS OF BOTH KNEES: ICD-10-CM

## 2025-03-20 DIAGNOSIS — M25.512 BILATERAL SHOULDER PAIN, UNSPECIFIED CHRONICITY: Primary | ICD-10-CM

## 2025-03-20 DIAGNOSIS — M25.511 BILATERAL SHOULDER PAIN, UNSPECIFIED CHRONICITY: Primary | ICD-10-CM

## 2025-03-20 PROCEDURE — 97110 THERAPEUTIC EXERCISES: CPT

## 2025-03-20 NOTE — PROGRESS NOTES
"Daily Note     Today's date: 3/20/2025  Patient name: Kimberly Montanez  : 1960  MRN: [id number]  Referring provider: Batsheva Gomes DO  Dx:   Encounter Diagnosis     ICD-10-CM    1. Bilateral shoulder pain, unspecified chronicity  M25.511     M25.512       2. Primary osteoarthritis of both knees  M17.0                  Subjective: Pt reports her knees are sore and her shoulders hurt today.       Objective: See treatment diary below      Assessment: Tolerated treatment fair. Verbal cues needed for correct form with exercises. Progressed to increased reps on pulleys today.  Pt reports pain in her Luis Angel knees and Luis Angel shoulders t/o exercises. Patient would benefit from continued PT to improve ROM, strength and overall functional mobility.       Plan: Continue per plan of care.        Visit Count 6 7 8 4 5      Manuals 3/13 3/17 3/20 3-6 3/10                   Neuro Re-Ed     3-6                    Ther Ex    3-6    NuStep for ROM and conditioning  L1  6' L1  7' L1 7' L1  6' L2  6'   HR/TR 1x15 ea  1x15 ea  1x15 ea 1x10 1x10 ea    Standing hip flex  1x15 Luis Angel  1x15 Luis Angel  1x15 luis angel 1x10 luis angel 1x10 Luis Angel   Standing hip abd  1x15 Luis Angel  1x15 Luis Angel  1x15 luis angel 1x10 luis angel 1x10 Luis Angel    Seated hip add 5\" 2x10  5\" 2x10   5\" 2 x10 5\" x10  5\" 2x10    Seated LAQ 5\" 2x10 Luis Angel  5\" 1x15 Luis Angel  5\" 2x10 luis angel 5\" x10 luis angel 5\" 1x10 Luis Angel   1x6 L  1x 6 R   Heel slides         QS         SAQ                 Shld shrugs  1x15  1x20  1x20 1x10 1x10    Retractions  3\" 1x15  3\" 1x 20 3\" 1x20 3\" x10 3\"x10    Pulleys: flex/scap  Flex  5\"x 15 Luis Angel  Flex 5\" 1x15 ea Luis Angel  Flex 1x20 Luis Angel  Scap 1x15 Luis Angel Flex 5\" x10 luis angel Flex 5\"x10 Luis Angel   Elbow curls  1x15  1x 20 1x20 1x10 1x10    Cane flex 1x20 seated  Seated 2x10  Seated 1x21 Seated 2x10 Seated 2x10    Table slides  Flex/scap  1x15 ea Luis Angel    Flex 1x15 Luis Angel  Scap 1x6 Luis Angel Flex 1x15 luis angel  Scap 1x 10 L            1x15 R Flex/scap 1x10 ea luis angel Flex/scap  1x10 ea Luis Angel                                            Ther Activity      "                   Gait Training                        Modalities

## 2025-03-24 ENCOUNTER — OFFICE VISIT (OUTPATIENT)
Dept: PHYSICAL THERAPY | Facility: HOME HEALTHCARE | Age: 65
End: 2025-03-24
Payer: COMMERCIAL

## 2025-03-24 DIAGNOSIS — M25.512 BILATERAL SHOULDER PAIN, UNSPECIFIED CHRONICITY: Primary | ICD-10-CM

## 2025-03-24 DIAGNOSIS — M25.511 BILATERAL SHOULDER PAIN, UNSPECIFIED CHRONICITY: Primary | ICD-10-CM

## 2025-03-24 DIAGNOSIS — M17.0 PRIMARY OSTEOARTHRITIS OF BOTH KNEES: ICD-10-CM

## 2025-03-24 PROCEDURE — 97110 THERAPEUTIC EXERCISES: CPT

## 2025-03-24 NOTE — PROGRESS NOTES
"Daily Note     Today's date: 3/24/2025  Patient name: Kimberly Montanez  : 1960  MRN: [id number]  Referring provider: Batsheva Gomes DO  Dx:   Encounter Diagnosis     ICD-10-CM    1. Bilateral shoulder pain, unspecified chronicity  M25.511     M25.512       2. Primary osteoarthritis of both knees  M17.0                  Subjective: Pt reports she has pain in her shoulders and no pain in her knees currently.       Objective: See treatment diary below      Assessment: Tolerated treatment fair. Verbal cues needed for correct form with exercises. Pt reports pain L shoulder > R shoulder during exercises. Patient would benefit from continued PT      Plan: Continue per plan of care.        Visit Count 6 7 8 9 5      Manuals 3/13 3/17 3/20 3/24 3/10                   Neuro Re-Ed                         Ther Ex        NuStep for ROM and conditioning  L1  6' L1  7' L1 7' L1  7' L2  6'   HR/TR 1x15 ea  1x15 ea  1x15 ea 1x15 ea  1x10 ea    Standing hip flex  1x15 Luis Angel  1x15 Luis Angel  1x15 luis angel 1x15 luis angel 1x10 Luis Angel   Standing hip abd  1x15 Luis Angel  1x15 Luis Angel  1x15 luis angel 1x15 luis angel 1x10 Luis Angel    Seated hip add 5\" 2x10  5\" 2x10   5\" 2 x10 5\" 2 x10  5\" 2x10    Seated LAQ 5\" 2x10 Luis Angel  5\" 1x15 Luis Angel  5\" 2x10 luis angel 5\" 2 x10 luis angel 5\" 1x10 Luis Angel   1x6 L  1x 6 R   Heel slides         QS         SAQ                 Shld shrugs  1x15  1x20  1x20 1x20 1x10    Retractions  3\" 1x15  3\" 1x 20 3\" 1x20 3\" x20 3\"x10    Pulleys: flex/scap  Flex  5\"x 15 Luis Angel  Flex 5\" 1x15 ea Luis Angel  Flex 1x20 Luis Angel  Scap 1x15 Luis Angel Flex 1x20 luis angel  Scap 1x15 Luis Angel Flex 5\"x10 Luis Angel   Elbow curls  1x15  1x 20 1x20 1x20 1x10    Cane flex 1x20 seated  Seated 2x10  Seated 1x21 Seated 1x 20 Seated 2x10    Table slides  Flex/scap  1x15 ea Luis Angel    Flex 1x15 Luis Angel  Scap 1x6 Luis Angel Flex 1x15 luis angel  Scap 1x 10 L            1x15 R Flex 1x15 Luis Angel  Scap 1x 15 Luis Angel    Flex/scap  1x10 ea Luis Angel                                            Ther Activity                        Gait Training                        Modalities      "

## 2025-03-26 DIAGNOSIS — Z00.6 ENCOUNTER FOR EXAMINATION FOR NORMAL COMPARISON OR CONTROL IN CLINICAL RESEARCH PROGRAM: ICD-10-CM

## 2025-03-27 ENCOUNTER — OFFICE VISIT (OUTPATIENT)
Dept: PHYSICAL THERAPY | Facility: HOME HEALTHCARE | Age: 65
End: 2025-03-27
Payer: COMMERCIAL

## 2025-03-27 DIAGNOSIS — M17.0 PRIMARY OSTEOARTHRITIS OF BOTH KNEES: ICD-10-CM

## 2025-03-27 DIAGNOSIS — M25.512 BILATERAL SHOULDER PAIN, UNSPECIFIED CHRONICITY: Primary | ICD-10-CM

## 2025-03-27 DIAGNOSIS — M25.511 BILATERAL SHOULDER PAIN, UNSPECIFIED CHRONICITY: Primary | ICD-10-CM

## 2025-03-27 PROCEDURE — 97110 THERAPEUTIC EXERCISES: CPT

## 2025-03-27 NOTE — PROGRESS NOTES
"Daily Note     Today's date: 3/27/2025  Patient name: Kimberly Montanez  : 1960  MRN: [id number]  Referring provider: Batsheva Gomes DO  Dx:   Encounter Diagnosis     ICD-10-CM    1. Bilateral shoulder pain, unspecified chronicity  M25.511     M25.512       2. Primary osteoarthritis of both knees  M17.0                      Subjective: Pt reports she has 7/10 pain in her shoulders and 5/10 pain in her knees today.       Objective: See treatment diary below      Assessment: Tolerated treatment fair. See re-eval today. Progressed to increased reps with table slides today. Patient would benefit from continued PT      Plan: Continue per plan of care.        Visit Count 6 7 8 9 10      Manuals 3/13 3/17 3/20 3/24 3/27                   Neuro Re-Ed                         Ther Ex        NuStep for ROM and conditioning  L1  6' L1  7' L1 7' L1  7' L1   7'   HR/TR 1x15 ea  1x15 ea  1x15 ea 1x15 ea  1x15 ea    Standing hip flex  1x15 Luis Angel  1x15 Luis Angel  1x15 luis angel 1x15 luis angel 1x15 Luis Angel   Standing hip abd  1x15 Luisa Ngel  1x15 Luis Angel  1x15 luis angel 1x15 luis angel 1x15 Luis Angel    Seated hip add 5\" 2x10  5\" 2x10   5\" 2 x10 5\" 2 x10  5\" 2x10    Seated LAQ 5\" 2x10 Luis Angel  5\" 1x15 Luis Angel  5\" 2x10 luis angel 5\" 2 x10 luis angel 5\" 2x10 Luis Angel    Heel slides         QS         SAQ                 Shld shrugs  1x15  1x20  1x20 1x20 1x20    Retractions  3\" 1x15  3\" 1x 20 3\" 1x20 3\" x20 3\"x20    Pulleys: flex/scap  Flex  5\"x 15 Luis Angel  Flex 5\" 1x15 ea Luis Angel  Flex 1x20 Luis Angel  Scap 1x15 Luis Angel Flex 1x20 luis angel  Scap 1x15 Luis Angel Flex 5\"x 20 Luis Angel  Scap 1x 20 Luis Angel    Elbow curls  1x15  1x 20 1x20 1x20 1x20    Cane flex 1x20 seated  Seated 2x10  Seated 1x21 Seated 1x 20 Seated 2x10    Table slides  Flex/scap  1x15 ea Luis Angel    Flex 1x15 Luis Angel  Scap 1x6 Luis Angel Flex 1x15 luis angel  Scap 1x 10 L            1x15 R Flex 1x15 Luis Angel  Scap 1x 15 Luis Angel    Flex/scap  1x20 ea Luis Angel                                            Ther Activity                        Gait Training                        Modalities                               "

## 2025-03-27 NOTE — PROGRESS NOTES
"PT Re-Evaluation     Today's date: 3/27/2025  Patient name: Kimberly Montanez  : 1960  MRN: [id number]  Referring provider: Batsheva Gomes DO  Dx:   Encounter Diagnosis     ICD-10-CM    1. Bilateral shoulder pain, unspecified chronicity  M25.511     M25.512       2. Primary osteoarthritis of both knees  M17.0                      Assessment  Impairments: abnormal or restricted ROM, abnormal movement, activity intolerance, impaired physical strength, lacks appropriate home exercise program, pain with function, scapular dyskinesis, weight-bearing intolerance, poor posture , unable to perform ADL, activity limitations and endurance    Assessment details: UPDATE:  Pt demonstrating good improvement in B shoulder ROM but with significant deficits remaining yet which impact pts ability to complete ADLs without limitations.  PT did not have pt lay supine for knee ROM assessment this date due to pain levels but will continue to work on regaining functional LE mobility and strength.  Pt would benefit from continued skilled therapy to address remaining deficits. Thank you.     Pt Kimberly Montanez is a 64 y.o. who presents to OPPT with s/s consistent with B shoulder and B knee pain due to OA. Pt presents with significant mobility deficits in B shoulders/B knee, decreased LE/UE strength, postural dysfunction, impaired soft tissue mobility, and pain with functional activities. Pt reporting tolerance < 10 minutes to standing/walking, modifications to washing, dressing, cooking, cleaning, and disrupted sleep due to deficits. Pt would benefit from skilled therapy services to address outlined impairments, work towards goals, and restore pts PLOF. Thank you!   Understanding of Dx/Px/POC: good     Prognosis: good    Goals  STGs to be achieved in 4 weeks:  -Pt to demonstrate reduced subjective pain rating \"at worst\" by at least 2-3 points from Initial Eval to allow for reduced pain with ADLs and improved functional activity " tolerance. - Ongoing   -Pt to demonstrate B knee ROM improved > 20* flexion in order to maximize joint mobility and function and allow for progression of exercise program and achievement of goals. - progressing but not met   -Pt to demonstrate B shoulder ROM improved > 20* flexion/abduction in order to maximize joint mobility and function and allow for progression of exercise program and achievement of goals. - progressing but not met   -Pt to demonstrate increased MMT of B UE/LE  by at least 1/2 grade in order to improve safety and stability with ADLs and functional mobility. - progressing but not met     LTGs to be achieved upon discharge:   -Pt will be I with HEP in order to continue to improve quality of life and independence and reduce risk for re-injury.   -Pt to demonstrate return to activities of daily living without limiations or restrictions.   -Pt will return to lifting/carrying > 5# to help facilitate return to community activities independently   -Pt to demonstrate return to ambulation with AD > 20 minutes to return to community based activities without limitations.   -Pt to demonstrate improved function as noted by achieving or exceeding predicted score on FOTO outcomes assessment tool.       Plan  Patient would benefit from: skilled physical therapy  Planned modality interventions: thermotherapy: hydrocollator packs    Planned therapy interventions: manual therapy, neuromuscular re-education, patient education, postural training, therapeutic exercise, therapeutic activities, home exercise program, flexibility and functional ROM exercises    Frequency: 2x week  Duration in weeks: 12  Plan of Care beginning date: 3/27/2025  Plan of Care expiration date: 6/19/2025  Treatment plan discussed with: patient and referring physician        Subjective Evaluation    History of Present Illness  Mechanism of injury: UPDATE:  Pt reporting that she is seeing improvement with therapy and would like to continue.      Pt reporting that she has been having knee and shoulder pain since the fall.  She had x-rays taken which were (+) for severe joint OA B knees and mild to moderate OA B shoulders. Pt ended up in ED on 25 due to severe pain and immobility.  PCP follow up with referral to OPPT for conservative management of s/s.     Quality of life: good    Patient Goals  Patient goals for therapy: decreased pain, improved balance, increased motion, increased strength and independence with ADLs/IADLs    Pain  At best pain ratin  At worst pain ratin  Quality: dull ache, grinding, tight and throbbing  Relieving factors: medications  Aggravating factors: standing, walking, stair climbing, lifting and overhead activity  Progression: no change    Social Support  Steps to enter house: yes      Diagnostic Tests  X-ray: abnormal  Treatments  Current treatment: physical therapy        Objective     Active Range of Motion   Left Shoulder   Flexion: 95 degrees   Abduction: 70 degrees   External rotation 0°: Left shoulder active external rotation at 0 degrees: 50%   Internal rotation 0°: WFL    Right Shoulder   Flexion: 105 degrees   Abduction: 80 degrees   External rotation 0°: Right shoulder active external rotation at 0 degrees: 50%   Internal rotation 0°: WFL  Left Knee   Flexion: 30 degrees   Extension: 0 degrees     Right Knee   Flexion: 40 degrees   Extension: 0 degrees     Strength/Myotome Testing     Left Shoulder     Planes of Motion   Flexion: 3-   Abduction: 3-   External rotation at 0°: 3   Internal rotation at 0°: 3+     Right Shoulder     Planes of Motion   Flexion: 3-   Abduction: 3-   External rotation at 0°: 3   Internal rotation at 0°: 3+     Left Knee   Flexion: 3+  Extension: 2-    Right Knee   Flexion: 3+  Extension: 2-    Ambulation   Weight-Bearing Status   Assistive device used: wheeled walker    Additional Weight-Bearing Status Details  Tolerance x 10 minutes with AD     Ambulation: Stairs   Pattern:  non-reciprocal  Railings: one rail  Pattern: non-reciprocal  Railings: one rail    Observational Gait   Gait: antalgic   Decreased walking speed and stride length.     Functional Assessment        Comments  Standing tolerance < 10 minutes  Sitting for cooking/dishes   Use of shower chair   Lifting tolerance < 5#   Reaching ability - shoulder height only

## 2025-03-31 ENCOUNTER — OFFICE VISIT (OUTPATIENT)
Dept: PHYSICAL THERAPY | Facility: HOME HEALTHCARE | Age: 65
End: 2025-03-31
Payer: COMMERCIAL

## 2025-03-31 DIAGNOSIS — M25.511 BILATERAL SHOULDER PAIN, UNSPECIFIED CHRONICITY: Primary | ICD-10-CM

## 2025-03-31 DIAGNOSIS — M17.0 PRIMARY OSTEOARTHRITIS OF BOTH KNEES: ICD-10-CM

## 2025-03-31 DIAGNOSIS — M25.512 BILATERAL SHOULDER PAIN, UNSPECIFIED CHRONICITY: Primary | ICD-10-CM

## 2025-03-31 PROCEDURE — 97110 THERAPEUTIC EXERCISES: CPT

## 2025-03-31 NOTE — PROGRESS NOTES
"Daily Note     Today's date: 3/31/2025  Patient name: Kimberly Montanez  : 1960  MRN: [id number]  Referring provider: Batsheva Gomes DO  Dx:   Encounter Diagnosis     ICD-10-CM    1. Bilateral shoulder pain, unspecified chronicity  M25.511     M25.512       2. Primary osteoarthritis of both knees  M17.0                      Subjective: Pt reports she has grinding pain in her L knee since the weekend and shooting pain in her Luis Angel shoulders today.       Objective: See treatment diary below      Assessment: Tolerated treatment fair. Verbal cues needed for correct from with exercises. Progressed to QS, SAQ and heel slides today. Pt reports pain Luis Angel shoulders and Luis Angel knees t/o exercises.  Patient would benefit from continued PT      Plan: Continue per plan of care.        Visit Count 2 7 8 9 1      Manuals 3/31 3/17 3/20 3/24 3/27                   Neuro Re-Ed                         Ther Ex        NuStep for ROM and conditioning  L1  7'  L1  7' L1 7' L1  7' L1   7'   HR/TR 1x15 ea  1x15 ea  1x15 ea 1x15 ea  1x15 ea    Standing hip flex  1x15 Luis Angel  1x15 Luis Angel  1x15 luis angel 1x15 luis angel 1x15 Luis Angel   Standing hip abd  1x15 Luis Angel  1x15 Luis Angel  1x15 luis angel 1x15 luis angel 1x15 Luis Angel    Seated hip add 5\" 2x10  5\" 2x10   5\" 2 x10 5\" 2 x10  5\" 2x10    Seated LAQ 5\" 2x10 Luis Angel  5\" 1x15 Luis Angel  5\" 2x10 luis angel 5\" 2 x10 luis angel 5\" 2x10 Luis Angel    Heel slides  1x10 Luis Angel        QS  5\" 1x10 Luis Angel        SAQ  5\" 1x15 Luis Angel                Shld shrugs  1x20 1x20  1x20 1x20 1x20    Retractions  3\" 1x20 3\" 1x 20 3\" 1x20 3\" x20 3\"x20    Pulleys: flex/scap  Flex  5\"x 15 Luis Angel  Scap 1x10 L   1x5 R  Flex 5\" 1x15 ea Luis Angel  Flex 1x20 Luis Angel  Scap 1x15 Luis Angel Flex 1x20 luis angel  Scap 1x15 Luis Angel Flex 5\"x 20 Luis Angel  Scap 1x 20 Luis Angel    Elbow curls  1x20 1x 20 1x20 1x20 1x20    Cane flex 1x20 seated  Seated 2x10  Seated 1x21 Seated 1x 20 Seated 2x10    Table slides  Flex/scap  1x20 ea Luis Angel    Flex 1x15 Luis Angel  Scap 1x6 Luis Angel Flex 1x15 luis angel  Scap 1x 10 L            1x15 R Flex 1x15 Luis Angel  Scap 1x 15 Luis Angel    Flex/scap  1x20 " ea Luis Angel                                            Ther Activity                        Gait Training                        Modalities

## 2025-04-03 ENCOUNTER — APPOINTMENT (OUTPATIENT)
Dept: PHYSICAL THERAPY | Facility: HOME HEALTHCARE | Age: 65
End: 2025-04-03
Payer: COMMERCIAL

## 2025-04-07 ENCOUNTER — TELEPHONE (OUTPATIENT)
Dept: NUTRITION | Facility: HOSPITAL | Age: 65
End: 2025-04-07

## 2025-04-07 ENCOUNTER — OFFICE VISIT (OUTPATIENT)
Dept: PHYSICAL THERAPY | Facility: HOME HEALTHCARE | Age: 65
End: 2025-04-07
Payer: COMMERCIAL

## 2025-04-07 DIAGNOSIS — M17.0 PRIMARY OSTEOARTHRITIS OF BOTH KNEES: ICD-10-CM

## 2025-04-07 DIAGNOSIS — M25.511 BILATERAL SHOULDER PAIN, UNSPECIFIED CHRONICITY: Primary | ICD-10-CM

## 2025-04-07 DIAGNOSIS — M25.512 BILATERAL SHOULDER PAIN, UNSPECIFIED CHRONICITY: Primary | ICD-10-CM

## 2025-04-07 PROCEDURE — 97110 THERAPEUTIC EXERCISES: CPT

## 2025-04-07 NOTE — TELEPHONE ENCOUNTER
Patient scheduled for [facility] OP counseling.  Unsure if insurance will cover visit.  VM left for patient to call office.
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
## 2025-04-08 ENCOUNTER — OFFICE VISIT (OUTPATIENT)
Age: 65
End: 2025-04-08

## 2025-04-08 ENCOUNTER — CLINICAL SUPPORT (OUTPATIENT)
Dept: NUTRITION | Facility: HOSPITAL | Age: 65
End: 2025-04-08
Payer: COMMERCIAL

## 2025-04-08 DIAGNOSIS — F41.1 GAD (GENERALIZED ANXIETY DISORDER): ICD-10-CM

## 2025-04-08 DIAGNOSIS — E66.813 CLASS 3 OBESITY: ICD-10-CM

## 2025-04-08 DIAGNOSIS — F33.2 SEVERE EPISODE OF RECURRENT MAJOR DEPRESSIVE DISORDER, WITHOUT PSYCHOTIC FEATURES (HCC): Primary | ICD-10-CM

## 2025-04-08 PROCEDURE — PBNCHG PB NO CHARGE PLACEHOLDER

## 2025-04-09 NOTE — PROGRESS NOTES
"Daily Note     Today's date: 4/10/2025  Patient name: Kimberly Montanez  : 1960  MRN: [id number]  Referring provider: Batsheva Gomes DO  Dx:   Encounter Diagnosis     ICD-10-CM    1. Bilateral shoulder pain, unspecified chronicity  M25.511     M25.512       2. Primary osteoarthritis of both knees  M17.0                      Subjective: The patient states that her knees continue to grind.  Shoulders and knees bothering her about the same this morning.       Objective: See treatment diary below      Assessment: Tolerated treatment fair.  She noted grinding in her shoulders with pulleys and table slides and was unable to get above shoulder height when doing the pulleys.  She was able to increase reps today with Qsets, heels slides and SAQ.  Patient demonstrated fatigue post treatment and would benefit from continued PT      Plan: Continue per plan of care.   Progress as able in upcoming visits.         Visit Count 2 3 8 9 10      Manuals 3/31 4/7 4/10 3/24 3/27                   Neuro Re-Ed                         Ther Ex        NuStep for ROM and conditioning  L1  7'  L1  7' L1 7' L1  7' L1   7'   HR/TR 1x15 ea  1x15 ea  1x15 ea 1x15 ea  1x15 ea    Standing hip flex  1x15 Luis Angel  1x15 Luis Angel  1x15 luis angel 1x15 luis angel 1x15 Luis Angel   Standing hip abd  1x15 Luis Angel  1x15 Luis Angel  1x15 luis angel 1x15 luis angel 1x15 Luis Angel    Seated hip add 5\" 2x10  5\" 2x10   5\" 2 x10 5\" 2 x10  5\" 2x10    Seated LAQ 5\" 2x10 Luis Angel  5\" 2x10 Luis Angel  5\" 2x10 luis angel 5\" 2 x10 luis angel 5\" 2x10 Luis Angel    Heel slides  1x10 Luis Angel  1x10 Luis Angel  1x15 Luis Angel     QS  5\" 1x10 Luis Angel  5\" 1x10 Luis Angel  5\" 2x10 Luis Angel     SAQ  5\" 1x15 Luis Angel  5\" 1x15 Luis Angel  5\" 2x10 Luis Angel             Shld shrugs  1x20 1x20  1x20 1x20 1x20    Retractions  3\" 1x20 3\" 1x 20 3\" 1x20 3\" x20 3\"x20    Pulleys: flex/scap  Flex  5\"x 15 Luis Angel  Scap 1x10 L   1x5 R  Flex /scap 5\" 1x15 ea Luis Angel  Flex & Scap   5\"x15 ea Luis Angel Flex 1x20 luis angel  Scap 1x15 Luis Angel Flex 5\"x 20 Luis Angel  Scap 1x 20 Luis Angel    Elbow curls  1x20 1x 20 1x20 1x20 1x20    Cane flex 1x20 seated  Seated 1x15 " Seated 1x15 Seated 1x 20 Seated 2x10    Table slides  Flex/scap  1x20 ea Luis Angel    Flex/scap  1x20 ea Luis Angel  Flex & Scap  20x ea Luis Angel Flex 1x15 Luis Angel  Scap 1x 15 Luis Angel    Flex/scap  1x20 ea Luis Angel                                            Ther Activity                        Gait Training                        Modalities

## 2025-04-10 ENCOUNTER — OFFICE VISIT (OUTPATIENT)
Dept: PHYSICAL THERAPY | Facility: HOME HEALTHCARE | Age: 65
End: 2025-04-10
Payer: COMMERCIAL

## 2025-04-10 DIAGNOSIS — M17.0 PRIMARY OSTEOARTHRITIS OF BOTH KNEES: ICD-10-CM

## 2025-04-10 DIAGNOSIS — M25.511 BILATERAL SHOULDER PAIN, UNSPECIFIED CHRONICITY: Primary | ICD-10-CM

## 2025-04-10 DIAGNOSIS — M25.512 BILATERAL SHOULDER PAIN, UNSPECIFIED CHRONICITY: Primary | ICD-10-CM

## 2025-04-10 PROCEDURE — 97110 THERAPEUTIC EXERCISES: CPT | Performed by: PHYSICAL THERAPIST

## 2025-04-14 ENCOUNTER — OFFICE VISIT (OUTPATIENT)
Dept: PHYSICAL THERAPY | Facility: HOME HEALTHCARE | Age: 65
End: 2025-04-14
Payer: COMMERCIAL

## 2025-04-14 DIAGNOSIS — M17.0 PRIMARY OSTEOARTHRITIS OF BOTH KNEES: ICD-10-CM

## 2025-04-14 DIAGNOSIS — M25.511 BILATERAL SHOULDER PAIN, UNSPECIFIED CHRONICITY: Primary | ICD-10-CM

## 2025-04-14 DIAGNOSIS — M25.512 BILATERAL SHOULDER PAIN, UNSPECIFIED CHRONICITY: Primary | ICD-10-CM

## 2025-04-14 PROCEDURE — 97110 THERAPEUTIC EXERCISES: CPT

## 2025-04-14 NOTE — PSYCH
MEDICATION MANAGEMENT NOTE        [facility]      Name and Date of Birth:  Kimberly Montanez 65 y.o. 1960 MRN: [id number]    Insurance: Payor: [facility] [facility] / Plan: [employer] [facility] / Product Type: [facility] /     Date of Visit: April 8, 2025    Reason for Visit: Medication Management    No medication changes at this time. Awaiting Esketamine treatment and patient to restart psychotherapy. Mood appears brighter today than in previous evaluations. Follow up in 2-3 weeks. Patient will report to [facility] office to finish signing paperwork.     Assessment & Plan  Severe episode of recurrent major depressive disorder, without psychotic features (HCC)  Celexa 40mg once per day   PARQ completed including serotonin syndrome, SIADH, worsening depression, suicidality, induction of nakul, GI upset, headaches, activation, sexual side effects, sedation, potential drug interactions, and others.   Cymbalta 20mg DR twice per day  PARQ completed including serotonin syndrome, SIADH, worsened depression/suicidality, induction of nakul, blood pressure changes and GI distress, weight gain, sexual side effects, insomnia, sedation, potential for drug interactions, and others.         KRISTIN (generalized anxiety disorder)  Celexa 40mg once per day   PARQ completed including serotonin syndrome, SIADH, worsening depression, suicidality, induction of nakul, GI upset, headaches, activation, sexual side effects, sedation, potential drug interactions, and others.   Cymbalta 20mg DR twice per day  PARQ completed including serotonin syndrome, SIADH, worsened depression/suicidality, induction of nakul, blood pressure changes and GI distress, weight gain, sexual side effects, insomnia, sedation, potential for drug interactions, and others.              Treatment Recommendations/Precautions:    Psychotropic Medication Regiment: Celexa 40mg QD and Cymbalta  20mg DR LOTT  Psychotherapy: Defer  Laboratory: N/A    Next Appointment: 3 weeks  Aware of 24 hour and weekend coverage for urgent situations accessed by calling [facility] main practice number    Medications Risks/Benefits:      Risks, Benefits And Possible Side Effects Of Medications:    Risks, benefits, and possible side effects of medications explained to Kimberly TERRAZAS and she verbalizes understanding and agreement for treatment.    Controlled Medication Discussion:     Not applicable    SUBJECTIVE:    Kimberly TERRAZAS is seen today for a follow up for Major Depressive Disorder and Generalized Anxiety Disorder.     In the three week interval since her last follow up, Karen endorses sustained low energy, hypersomnia, anhedonia, and feelings of excess guilt exacerbated by conflict with her brother and mother over future estate and inheritance planning. Her affect does appear somewhat brighter as she discusses having spent time with her grand daughter Sydnie, but continues to be focused on her daughter's shortcoming of a parent to Sydnie. Psychotherapy provided at today's session--discuss benefits and cons patients may receive focusing on her daughter's short coming as a parent. Positive therapeutic relationship maintained during this discussion.     She denies suicidal ideation, intent or plan at present; denies homicidal ideation, intent or plan at present.    She denies auditory hallucinations, denies visual hallucinations, has no overt delusions.    She denies any side effects from medications.    HPI ROS Appetite Changes and Sleep:     She reports hypersomnia, normal appetite, low energy    Current Rating Scores:     None completed today.    Review Of Systems:      Constitutional negative   ENT negative   Cardiovascular negative   Respiratory negative   Gastrointestinal negative   Genitourinary negative   Musculoskeletal negative   Integumentary negative   Neurological negative   Endocrine negative    Other Symptoms none, all other systems are negative       Past Psychiatric History: (unchanged information from previous note copied and updated)     Previous inpatient psychiatric admissions: denies.  Previous inpatient/outpatient substance abuse rehabilitation: denies.  Present/previous outpatient psychiatric treatment: denies.  Present/previous psychotherapy: In therapy.  History of suicidal attempts/gestures: Denies.  History of violence/aggressive behaviors: denies.  Present/previous psychotropic medication use: Celexa 40mg QD.     Traumatic History: (unchanged information from previous note copied and updated)     Abuse:emotional and verbal  Other Traumatic Events:  defer     Substance Abuse History:     Patient denies history of alcohol, illict substance, or tobacco abuse.     Kimberly TERRAZAS does not apear under the influence or withdrawal of any psychoactive substance throughout today's examination.      Social History:     Educational History:  Academic history: high school diploma/GED  Marital history:   Social support system: parents, extended family, and friend(s)  Residential history: Resides in home; lives by herself  Vocational History: works as healthcare aid  Access to guns/weapons: none  Legal History: denies     Family Psychiatric History:      Suicide attempts in all three of her children; no hx of bipolar or schizophrenia in family     Past Medical History:    Past Medical History:   Diagnosis Date    Anxiety     Depression 2013    Disease of thyroid gland     Heart murmur     Obesity 1979    Osteoarthritis 2024    S/p partial hysterectomy with remaining cervical stump 05/03/2018        Past Surgical History:   Procedure Laterality Date    TOTAL ABDOMINAL HYSTERECTOMY      TUBAL LIGATION      WISDOM TOOTH EXTRACTION       Allergies   Allergen Reactions    Effexor [Venlafaxine] Hives     Patient developed hives when taking this medication. Improved with stopping medication and returned when  patient retried medication on her own.        Current Outpatient Medications:    Current Outpatient Medications   Medication Sig Dispense Refill    aspirin (ECOTRIN LOW STRENGTH) 81 mg EC tablet Take 81 mg by mouth      celecoxib (CeleBREX) 100 mg capsule Take 1 capsule (100 mg total) by mouth 2 (two) times a day 60 capsule 2    citalopram (CeleXA) 40 mg tablet Take 1 tablet (40 mg total) by mouth daily 30 tablet 2    DULoxetine (CYMBALTA) 20 mg capsule Take 1 capsule (20 mg total) by mouth 2 (two) times a day 60 capsule 2    ergocalciferol (VITAMIN D2) 50,000 units Take 1 capsule (50,000 Units total) by mouth once a week 12 capsule 5    Esketamine HCl, 56 MG Dose, (SPRAVATO) 28 MG/DEVICE nasal spray 2 sprays by Intranasal route 2 (two) times a week for 15 days      levothyroxine 75 mcg tablet Take 1 tablet (75 mcg total) by mouth daily 90 tablet 3    meclizine (ANTIVERT) 25 mg tablet Take 1 tablet (25 mg total) by mouth 3 (three) times a day as needed for dizziness for up to 15 days 45 tablet 0    Multiple Vitamins-Minerals (MULTIVITAMIN ADULTS 50+) TABS Take by mouth      rosuvastatin (CRESTOR) 10 MG tablet Take 1 tablet (10 mg total) by mouth daily 90 tablet 3     No current facility-administered medications for this visit.       History Review: The following portions of the patient's history were reviewed and updated as appropriate: allergies, current medications, past family history, past medical history, past social history, past surgical history, and problem list.       OBJECTIVE:     Vital signs in last 24 hours:    There were no vitals filed for this visit.    Mental Status Evaluation:     Appearance age appropriate, casually dressed   Behavior cooperative, mildly anxious   Speech normal rate, normal volume, normal pitch   Mood improved   Affect normal range and intensity, appropriate   Thought Processes organized, logical, coherent, goal directed, linear, normal rate of thoughts, normal abstract reasoning    Associations intact associations   Thought Content no overt delusions   Perceptual Disturbances: no auditory hallucinations, no visual hallucinations   Abnormal Thoughts  Risk Potential Suicidal ideation - None  Homicidal ideation - None  Potential for aggression - No   Orientation oriented to person, place, time/date, and situation   Memory recent and remote memory grossly intact   Consciousness alert and awake   Attention Span Concentration Span attention span and concentration are age appropriate   Intellect appears to be of average intelligence   Insight intact   Judgement intact   Muscle Strength and  Gait normal muscle strength and normal muscle tone, normal gait and normal balance   Motor activity no abnormal movements   Language no difficulty naming common objects, no difficulty repeating a phrase, no difficulty writing a sentence   Fund of Knowledge adequate knowledge of current events  adequate fund of knowledge regarding past history  adequate fund of knowledge regarding vocabulary    Pain none   Pain Scale 0       Laboratory Results: I have personally reviewed all pertinent laboratory/tests results    Recent Labs:   No visits with results within 1 Month(s) from this visit.   Latest known visit with results is:   Appointment on 02/10/2025   Component Date Value    LDL Direct 02/10/2025 95     TSH 3RD GENERATON 02/10/2025 2.888        Suicide/Homicide Risk Assessment:    The following interventions are recommended: continue medication management. Although patient's acute lethality risk is low, long-term/chronic lethality risk is mildly elevated in the presence of MDD, KRISTIN. At the current moment, Kimberly TERRAZAS is future-oriented, forward-thinking, and demonstrates ability to act in a self-preserving manner as evidenced by volitionally presenting to the clinic today, seeking treatment.To mitigate future risk, patient should adhere to the recommendations of this writer, avoid alcohol/illicit substance use,  utilize community-based resources and familiar support and prioritize mental health treatment.     Presently, patient denies suicidal/homicidal ideation in addition to thoughts of self-injury; contracts for safety, see below for risk assessment. At conclusion of evaluation, patient is amenable to the recommendations of this writer including: medication management.  Also, patient is amenable to calling/contacting the outpatient office including this writer if any acute adverse effects of their medication regimen arise in addition to any comments or concerns pertaining to their psychiatric management.  Patient is amenable to calling/contacting crisis and/or attending to the nearest emergency department if their clinical condition deteriorates to assure their safety and stability, stating that they are able to appropriately confide in their psychiatrist regarding their psychiatric state.     At this juncture, inpatient hospitalization is not currently warranted. The following interventions are recommended:   no intervention changes    To mitigate future risk, patient should adhere to the recommendations of this writer, avoid alcohol/illicit substance use, utilize community-based resources and familiar support and prioritize mental health treatment.     Psychotherapy Provided:     Individual psychotherapy provided: Yes     Treatment Plan:    Completed and signed during the session: Not applicable - Treatment Plan not due at this session    Note Share:    This note was not shared with the patient due to reasonable likelihood of causing patient harm    Visit Time    Visit Start Time: 10:30 AM  Visit Stop Time: 11:00 AM  Total Visit Duration:  30 minutes    Alex Espino MD 04/08/25

## 2025-04-14 NOTE — PROGRESS NOTES
"Daily Note     Today's date: 2025  Patient name: Kimberly Montanez  : 1960  MRN: [id number]  Referring provider: Batsheva Gomes DO  Dx:   Encounter Diagnosis     ICD-10-CM    1. Bilateral shoulder pain, unspecified chronicity  M25.511     M25.512       2. Primary osteoarthritis of both knees  M17.0                  Subjective: Pt reports she had a lot of pain in her knees and shoulders over the weekend. Pt repots she has pain in her Luis Angel knees and Luis Angel shoulders today.       Objective: See treatment diary below      Assessment: Tolerated treatment fair. Progressed to supine SLR, hip abd with theraband and increased reps with heelslides, standing TR/HR and standing hip flex/abd today. Pt challenged with supine SLR. Fatigue noted toward end of session. Patient would benefit from continued PT      Plan: Continue per plan of care.        Visit Count 2 3 4 5 1      Manuals 3/31 4/7 4/10 4/14 3/27                   Neuro Re-Ed                         Ther Ex        NuStep for ROM and conditioning  L1  7'  L1  7' L1 7' L1  7' L1   7'   HR/TR 1x15 ea  1x15 ea  1x15 ea 1x20 ea  1x15 ea    Standing hip flex  1x15 Luis Angel  1x15 Luis Angel  1x15 luis angel 1x20 luis angel 1x15 Luis Angel   Standing hip abd  1x15 Luis Angel  1x15 Luis Angel  1x15 luis angel 1x20 luis angel 1x15 Luis Angel    Seated hip add 5\" 2x10  5\" 2x10   5\" 2 x10 5\" 2 x10  5\" 2x10    Seated LAQ 5\" 2x10 Luis Angel  5\" 2x10 Luis Angel  5\" 2x10 luis angel 5\" 2 x10 luis angel 5\" 2x10 Luis Angel    Heel slides  1x10 Luis Angel  1x10 Luis Angel  1x15 Luis Angel 1x20 Luis Angel    QS  5\" 1x10 Luis Angel  5\" 1x10 Luis Angel  5\" 2x10 Luis Angel 5\" 2x10 Luis Angel    SAQ  5\" 1x15 Luis Angel  5\" 1x15 Luis Angel  5\" 2x10 Luis Angel 5\" 2x10 Luis Angel    Supine SLR    1x6 L  1x 6 R    Shld shrugs  1x20 1x20  1x20 1x20 1x20    Retractions  3\" 1x20 3\" 1x 20 3\" 1x20 3\" x20 3\"x20    Pulleys: flex/scap  Flex  5\"x 15 Luis Angel  Scap 1x10 L   1x5 R  Flex /scap 5\" 1x15 ea Luis Angel  Flex & Scap   5\"x15 ea Luis Angel Flex & scap   5\"x 15 ea Luis Angel  Flex 5\"x 20 Luis Angel  Scap 1x 20 Luis Angel    Elbow curls  1x20 1x 20 1x20 1x20 1x20    Cane flex 1x20 seated  Seated 1x15 Seated 1x15 " Seated 1x 20 Seated 2x10    Table slides  Flex/scap  1x20 ea Luis Angel    Flex/scap  1x20 ea Luis Angel  Flex & Scap  20x ea Luis Angel Flex & scap   X20 ea Luis Angel    Flex/scap  1x20 ea Luis Angel    Hip abd     Seated   Yellow                                      Ther Activity                        Gait Training                        Modalities

## 2025-04-17 ENCOUNTER — APPOINTMENT (OUTPATIENT)
Dept: PHYSICAL THERAPY | Facility: HOME HEALTHCARE | Age: 65
End: 2025-04-17
Payer: COMMERCIAL

## 2025-04-21 ENCOUNTER — OFFICE VISIT (OUTPATIENT)
Dept: PHYSICAL THERAPY | Facility: HOME HEALTHCARE | Age: 65
End: 2025-04-21
Payer: COMMERCIAL

## 2025-04-21 DIAGNOSIS — M25.512 BILATERAL SHOULDER PAIN, UNSPECIFIED CHRONICITY: Primary | ICD-10-CM

## 2025-04-21 DIAGNOSIS — M25.511 BILATERAL SHOULDER PAIN, UNSPECIFIED CHRONICITY: Primary | ICD-10-CM

## 2025-04-21 DIAGNOSIS — M17.0 PRIMARY OSTEOARTHRITIS OF BOTH KNEES: ICD-10-CM

## 2025-04-21 PROCEDURE — 97110 THERAPEUTIC EXERCISES: CPT

## 2025-04-21 NOTE — PROGRESS NOTES
"Daily Note     Today's date: 2025  Patient name: Kimberly Montanez  : 1960  MRN: [id number]  Referring provider: Batsheva Gomes DO  Dx:   Encounter Diagnosis     ICD-10-CM    1. Bilateral shoulder pain, unspecified chronicity  M25.511     M25.512       2. Primary osteoarthritis of both knees  M17.0                  Subjective: Pt reports she has pain in her shoulders greater than her her knees today.        Objective: See treatment diary below      Assessment: Tolerated treatment fair. Some verbal cues needed for correct form with exercises. Pt reports pain Luis Angel shoulders t/o exercises. Pt challenged with supine SLR. Fatigue noted toward end of session. Patient would benefit from continued PT      Plan: Continue per plan of care.        Visit Count 2 3 4 5 6      Manuals 3/31 4/7 4/10 4/14 4/21                   Neuro Re-Ed                         Ther Ex        NuStep for ROM and conditioning  L1  7'  L1  7' L1 7' L1  7' L1   7'   HR/TR 1x15 ea  1x15 ea  1x15 ea 1x20 ea  1x20 ea    Standing hip flex  1x15 Luis Angel  1x15 Luis Angel  1x15 luis angel 1x20 luis angel 1x20 Luis Angel   Standing hip abd  1x15 Luis Angel  1x15 Luis Angel  1x15 luis angel 1x20 luis angel 1x20 Luis Angel    Seated hip add 5\" 2x10  5\" 2x10   5\" 2 x10 5\" 2 x10  5\" 2x10    Seated LAQ 5\" 2x10 Luis Angel  5\" 2x10 Luis Angel  5\" 2x10 luis angel 5\" 2 x10 luis angel 5\" 2x10 Luis Angel    Heel slides  1x10 Luis Angel  1x10 Luis Angel  1x15 Luis Angel 1x20 Luis Angel 1x20 Luis Angel   QS  5\" 1x10 Luis Angel  5\" 1x10 Luis Angel  5\" 2x10 Luis Angel 5\" 2x10 Luis Angel 5\" 2x10 Luis Angel   SAQ  5\" 1x15 Luis Angel  5\" 1x15 Luis Angel  5\" 2x10 Luis Angel 5\" 2x10 Luis Angel 5\" 2x10 Luis Angel   Supine SLR    1x6 L  1x 6 R 1x6 L  1x6 R   Shld shrugs  1x20 1x20  1x20 1x20 1x20    Retractions  3\" 1x20 3\" 1x 20 3\" 1x20 3\" x20 3\"x20    Pulleys: flex/scap  Flex  5\"x 15 Luis Angel  Scap 1x10 L   1x5 R  Flex /scap 5\" 1x15 ea Luis Angel  Flex & Scap   5\"x15 ea Luis Angel Flex & scap   5\"x 15 ea Luis Angel  Flex 5\"x 20 Luis Angel  Scap 1x 20 Luis Angel    Elbow curls  1x20 1x 20 1x20 1x20 1x20    Cane flex 1x20 seated  Seated 1x15 Seated 1x15 Seated 1x 20 Seated 2x10    Table slides  " Flex/scap  1x20 ea Luis Angel    Flex/scap  1x20 ea Luis Angel  Flex & Scap  20x ea Luis Angel Flex & scap   X20 ea Luis Angel    Flex/scap  1x20 ea Luis Angel    Hip abd     Seated   Yellow   Seated   Yellow   1x20                                   Ther Activity                        Gait Training                        Modalities

## 2025-04-24 ENCOUNTER — OFFICE VISIT (OUTPATIENT)
Dept: PHYSICAL THERAPY | Facility: HOME HEALTHCARE | Age: 65
End: 2025-04-24
Payer: COMMERCIAL

## 2025-04-24 DIAGNOSIS — M25.512 BILATERAL SHOULDER PAIN, UNSPECIFIED CHRONICITY: Primary | ICD-10-CM

## 2025-04-24 DIAGNOSIS — M17.0 PRIMARY OSTEOARTHRITIS OF BOTH KNEES: ICD-10-CM

## 2025-04-24 DIAGNOSIS — M25.511 BILATERAL SHOULDER PAIN, UNSPECIFIED CHRONICITY: Primary | ICD-10-CM

## 2025-04-24 PROCEDURE — 97110 THERAPEUTIC EXERCISES: CPT

## 2025-04-24 NOTE — PROGRESS NOTES
"Daily Note     Today's date: 2025  Patient name: Kimberly Montanez  : 1960  MRN: [id number]  Referring provider: Batsheva Gomes DO  Dx:   Encounter Diagnosis     ICD-10-CM    1. Bilateral shoulder pain, unspecified chronicity  M25.511     M25.512       2. Primary osteoarthritis of both knees  M17.0                  Subjective: Pt reports her shoulders are hurting and her knees are ok right now.       Objective: See treatment diary below      Assessment: Tolerated treatment fair. Progressed to weight with LAQ today.  Some verbal cues needed for correct form with exercises. Pt reports pain Luis Angel shoulders t/o exercises. Pt still challenged with supine SLR. Patient would benefit from continued PT      Plan: Continue per plan of care.        Visit Count 7 3 4 5 6      Manuals 4/24 4/7 4/10 4/14 4/21                   Neuro Re-Ed                         Ther Ex        NuStep for ROM and conditioning  L1  7'  L1  7' L1 7' L1  7' L1   7'   HR/TR 1 x 20ea  1x15 ea  1x15 ea 1x20 ea  1x20 ea    Standing hip flex  1x20 Luis Angel  1x15 Luis Angel  1x15 luis angel 1x20 luis angel 1x20 Luis Angel   Standing hip abd  1x20 Luis Angel  1x15 Luis Angel  1x15 luis angel 1x20 luis angel 1x20 Luis Angel    Seated hip add 5\" 2x10  5\" 2x10   5\" 2 x10 5\" 2 x10  5\" 2x10    Seated LAQ 1# 5\" 1x15 x  Luis Angel  5\" 2x10 Luis Angel  5\" 2x10 luis angel 5\" 2 x10 luis angel 5\" 2x10 Luis Angel    Heel slides  1x20 Luis Angel  1x10 Luis Angel  1x15 Luis Angel 1x20 Luis Angel 1x20 Luis Angel   QS  5\" 2x10 Luis Angel  5\" 1x10 Luis Angel  5\" 2x10 Luis Angel 5\" 2x10 Luis Angel 5\" 2x10 Luis Angel   SAQ  5\" 2x10 Luis Angel  5\" 1x15 Luis Angel  5\" 2x10 Luis Angel 5\" 2x10 Luis Angel 5\" 2x10 Luis Angel   Supine SLR 1x6 R  1x 7 L    1x6 L  1x 6 R 1x6 L  1x6 R   Shld shrugs  1x20 1x20  1x20 1x20 1x20    Retractions  3\" 1x20 3\" 1x 20 3\" 1x20 3\" x20 3\"x20    Pulleys: flex/scap  Flex  5\"x 20 Luis Angel  Scap 5\"x20 Luis Angel Flex /scap 5\" 1x15 ea Luis Angel  Flex & Scap   5\"x15 ea Luis Angel Flex & scap   5\"x 15 ea Luis Angel  Flex 5\"x 20 Luis Angel  Scap 1x 20 Luis Angel    Elbow curls  1x20 1x 20 1x20 1x20 1x20    Cane flex 1x20 seated  Seated 1x15 Seated 1x15 Seated 1x 20 Seated 2x10    Table slides  " Flex/scap  1x20 ea Luis Angel    Flex/scap  1x20 ea Luis Angel  Flex & Scap  20x ea Luis Angel Flex & scap   X20 ea Luis Angel    Flex/scap  1x20 ea Luis Angel    Hip abd  Seated yellow 1x20   Seated   Yellow   Seated   Yellow   1x20                                   Ther Activity                        Gait Training                        Modalities

## 2025-04-28 ENCOUNTER — OFFICE VISIT (OUTPATIENT)
Dept: PHYSICAL THERAPY | Facility: HOME HEALTHCARE | Age: 65
End: 2025-04-28
Attending: INTERNAL MEDICINE
Payer: COMMERCIAL

## 2025-04-28 DIAGNOSIS — M25.512 BILATERAL SHOULDER PAIN, UNSPECIFIED CHRONICITY: Primary | ICD-10-CM

## 2025-04-28 DIAGNOSIS — M17.0 PRIMARY OSTEOARTHRITIS OF BOTH KNEES: ICD-10-CM

## 2025-04-28 DIAGNOSIS — M25.511 BILATERAL SHOULDER PAIN, UNSPECIFIED CHRONICITY: Primary | ICD-10-CM

## 2025-04-28 PROCEDURE — 97110 THERAPEUTIC EXERCISES: CPT

## 2025-04-28 NOTE — PROGRESS NOTES
"Daily Note     Today's date: 2025  Patient name: Kimberly Montanez  : 1960  MRN: [id number]  Referring provider: Batsheva Gomes DO  Dx:   Encounter Diagnosis     ICD-10-CM    1. Bilateral shoulder pain, unspecified chronicity  M25.511     M25.512       2. Primary osteoarthritis of both knees  M17.0                  Subjective: Pt reports she has pain in her shoulders and her knees are ok right now.       Objective: See treatment diary below      Assessment:  Progressed to increased time on Nustep and added weight with standing hip abd and SAQ today. Pt attempted weight with standing hip flexion but was unable to complete with weight reporting she couldn't lift her legs with the weight on. Pt reports pain Luis Angel shoulders t/o exercises. Pt reports soreness in her knees at end of session. Patient would benefit from continued PT      Plan: Continue per plan of care.        Visit Count 7 8 4 5 6      Manuals 4/24 4/28 4/10 4/14 4/21                   Neuro Re-Ed                         Ther Ex        NuStep for ROM and conditioning  L1  7' L1  8'45\" L1 7' L1  7' L1   7'   HR/TR 1 x 20ea  1x20 ea  1x15 ea 1x20 ea  1x20 ea    Standing hip flex  1x20 Luis Angel  0# 1x20 Luis Angel  1x15 luis angel 1x20 luis angel 1x20 Luis Angel   Standing hip abd  1x20 Luis Angel  1# 2x10 Luis Angel  1x15 luis angel 1x20 luis angel 1x20 Luis Angel    Seated hip add 5\" 2x10  5\" 2x10   5\" 2 x10 5\" 2 x10  5\" 2x10    Seated LAQ 1# 5\" 1x20 Luis Angel  1# 5\" 2x10 Luis Angel  5\" 2x10 luis angel 5\" 2 x10 luis angel 5\" 2x10 Luis Angel    Heel slides  1x20 Luis Angel 1x20 L  1x16 R 1x15 Luis Angel 1x20 Luis Angel 1x20 Luis Angel   QS  5\" 2x10 Luis Angel  5\" 2x10 Luis Angel  5\" 2x10 Luis Angel 5\" 2x10 Luis Angel 5\" 2x10 Luis Angel   SAQ  5\" 2x10 Luis Angel  1# 5\" 2x10 Luis Angel  5\" 2x10 Luis Angel 5\" 2x10 Luis Angel 5\" 2x10 Luis Angel   Supine SLR 1x6 R  1x7 L   1x6 L  1x 6 R 1x6 L  1x6 R   Shld shrugs  1x20 1x20  1x20 1x20 1x20    Retractions  3\" 1x20 3\" 1x 20 3\" 1x20 3\" x20 3\"x20    Pulleys: flex/scap  Flex  5\"x 20 Luis Angel  Scap 5\"x20 Luis Angel Flex /scap 5\" 1x20 ea Luis Angel  Flex & Scap   5\"x15 ea Luis Angel Flex & scap   5\"x 15 ea Luis Angel  Flex 5\"x 20 " Luis Angel  Scap 1x 20 Luis Angel    Elbow curls  1x20 1x 20 1x20 1x20 1x20    Cane flex 1x20 seated  Seated 1x20 Seated 1x15 Seated 1x 20 Seated 2x10    Table slides  Flex/scap  1x20 ea Luis Angel    Flex/scap  1x20 ea Luis Angel  Flex & Scap  20x ea Luis Angel Flex & scap   X20 ea Luis Angel    Flex/scap  1x20 ea Luis Angel    Hip abd  Seated yellow 1x20 Seated yellow   1x20  Seated   Yellow   Seated   Yellow   1x20                                   Ther Activity                        Gait Training                        Modalities

## 2025-04-29 ENCOUNTER — APPOINTMENT (OUTPATIENT)
Dept: LAB | Facility: MEDICAL CENTER | Age: 65
End: 2025-04-29

## 2025-04-29 ENCOUNTER — APPOINTMENT (OUTPATIENT)
Dept: PHYSICAL THERAPY | Facility: HOME HEALTHCARE | Age: 65
End: 2025-04-29
Attending: INTERNAL MEDICINE
Payer: COMMERCIAL

## 2025-04-29 ENCOUNTER — OFFICE VISIT (OUTPATIENT)
Age: 65
End: 2025-04-29

## 2025-04-29 DIAGNOSIS — Z00.6 ENCOUNTER FOR EXAMINATION FOR NORMAL COMPARISON OR CONTROL IN CLINICAL RESEARCH PROGRAM: ICD-10-CM

## 2025-04-29 DIAGNOSIS — F41.1 GAD (GENERALIZED ANXIETY DISORDER): ICD-10-CM

## 2025-04-29 DIAGNOSIS — F33.2 SEVERE EPISODE OF RECURRENT MAJOR DEPRESSIVE DISORDER, WITHOUT PSYCHOTIC FEATURES (HCC): ICD-10-CM

## 2025-04-29 DIAGNOSIS — F43.21 GRIEF: ICD-10-CM

## 2025-04-29 PROCEDURE — PBNCHG PB NO CHARGE PLACEHOLDER

## 2025-04-29 PROCEDURE — 36415 COLL VENOUS BLD VENIPUNCTURE: CPT

## 2025-04-29 RX ORDER — CITALOPRAM HYDROBROMIDE 40 MG/1
40 TABLET ORAL DAILY
Qty: 30 TABLET | Refills: 2 | Status: SHIPPED | OUTPATIENT
Start: 2025-04-29 | End: 2025-07-28

## 2025-04-29 RX ORDER — DULOXETIN HYDROCHLORIDE 20 MG/1
20 CAPSULE, DELAYED RELEASE ORAL 2 TIMES DAILY
Qty: 60 CAPSULE | Refills: 2 | Status: SHIPPED | OUTPATIENT
Start: 2025-04-29 | End: 2025-07-28

## 2025-04-29 NOTE — PSYCH
MEDICATION MANAGEMENT NOTE        [facility]      Name and Date of Birth:  Kimberly Montanez 65 y.o. 1960 MRN: [id number]    Insurance: Payor: [facility] [facility] [facility] / Plan: [employer] [facility] / Product Type: [facility] /     Date of Visit: April 29, 2025    Reason for Visit: Medication Management    Worsening of mood in the setting of conflict with her daughter, suspect transient. Follow up in 2 weeks. No medication changes at this time.     Assessment & Plan  Severe episode of recurrent major depressive disorder, without psychotic features (HCC)    Celexa 40mg once per day   PARQ completed including serotonin syndrome, SIADH, worsening depression, suicidality, induction of nakul, GI upset, headaches, activation, sexual side effects, sedation, potential drug interactions, and others.   Cymbalta 20mg DR twice per day  PARQ completed including serotonin syndrome, SIADH, worsened depression/suicidality, induction of nakul, blood pressure changes and GI distress, weight gain, sexual side effects, insomnia, sedation, potential for drug interactions, and others.      KRISTIN (generalized anxiety disorder)    Celexa 40mg once per day   PARQ completed including serotonin syndrome, SIADH, worsening depression, suicidality, induction of nakul, GI upset, headaches, activation, sexual side effects, sedation, potential drug interactions, and others.   Cymbalta 20mg DR twice per day  PARQ completed including serotonin syndrome, SIADH, worsened depression/suicidality, induction of nakul, blood pressure changes and GI distress, weight gain, sexual side effects, insomnia, sedation, potential for drug interactions, and others.           Treatment Recommendations/Precautions:     Psychotropic Medication Regiment: Celexa 40mg QD and Cymbalta 20mg DR BID  Psychotherapy: Defer  Laboratory: N/A     Next Appointment: 3 weeks  Aware of 24 hour and weekend  coverage for urgent situations accessed by calling [facility] main practice number     Medications Risks/Benefits:       Risks, Benefits And Possible Side Effects Of Medications:     Risks, benefits, and possible side effects of medications explained to Kimberly TERRAZAS and she verbalizes understanding and agreement for treatment.     Controlled Medication Discussion:      Not applicable    SUBJECTIVE:    Kimberly TERRAZAS is seen today for a follow up for Major Depressive Disorder.     In the interval since her last follow up, Karen states that she has been feeling worse. States that her younger daughter Jillian was recently hospitalized on a 302 petitioned by her older daughter Katrin for apparent psychotic symptoms that were occurring in the context of benzodiazepine conversion from Xanax to Valium. She quickly improved with adjustment in her benzodiazepine, and was encouraged by her younger daughter to tell her psychiatrists that Katrin had lied about the symptoms in order to allow for Jillian to be discharged. This information came back to Katrin, who restricted Karen's access to her granddaughter Sydnie this past weekend. Karen endorses increased dysphoric mood, tearfulness, and low energy over the past few days, but notes that she is still attempting to regular commit to her weight loss group and otherwise stay active. Karen completes additional paperwork for esketamine therapy at today's evaluation. Agreeable to no change in medication and to follow up in 2-3 weeks    She denies suicidal ideation, intent or plan at present; denies homicidal ideation, intent or plan at present.     She denies auditory hallucinations, denies visual hallucinations, has no overt delusions.     She denies any side effects from medications.     HPI ROS Appetite Changes and Sleep:      She reports hypersomnia, normal appetite, low energy     Current Rating Scores:      None completed today.     Review Of Systems:      Constitutional  negative   ENT negative   Cardiovascular negative   Respiratory negative   Gastrointestinal negative   Genitourinary negative   Musculoskeletal negative   Integumentary negative   Neurological negative   Endocrine negative   Other Symptoms none, all other systems are negative         Past Psychiatric History: (unchanged information from previous note copied and updated)     Previous inpatient psychiatric admissions: denies.  Previous inpatient/outpatient substance abuse rehabilitation: denies.  Present/previous outpatient psychiatric treatment: denies.  Present/previous psychotherapy: In therapy.  History of suicidal attempts/gestures: Denies.  History of violence/aggressive behaviors: denies.  Present/previous psychotropic medication use: Celexa 40mg QD.     Traumatic History: (unchanged information from previous note copied and updated)     Abuse:emotional and verbal  Other Traumatic Events:  defer     Substance Abuse History:     Patient denies history of alcohol, illict substance, or tobacco abuse.     Kimberly TERRAZAS does not apear under the influence or withdrawal of any psychoactive substance throughout today's examination.      Social History:     Educational History:  Academic history: high school diploma/GED  Marital history:   Social support system: parents, extended family, and friend(s)  Residential history: Resides in home; lives by herself  Vocational History: works as healthcare aid  Access to guns/weapons: none  Legal History: denies     Family Psychiatric History:      Suicide attempts in all three of her children; no hx of bipolar or schizophrenia in family     Past Medical History:    Past Medical History:   Diagnosis Date    Anxiety     Depression 2013    Disease of thyroid gland     Heart murmur     Obesity 1979    Osteoarthritis 2024    S/p partial hysterectomy with remaining cervical stump 05/03/2018        Past Surgical History:   Procedure Laterality Date    TOTAL ABDOMINAL HYSTERECTOMY       TUBAL LIGATION      WISDOM TOOTH EXTRACTION       Allergies   Allergen Reactions    Effexor [Venlafaxine] Hives     Patient developed hives when taking this medication. Improved with stopping medication and returned when patient retried medication on her own.        Current Outpatient Medications:    Current Outpatient Medications   Medication Sig Dispense Refill    citalopram (CeleXA) 40 mg tablet Take 1 tablet (40 mg total) by mouth daily 30 tablet 2    DULoxetine (CYMBALTA) 20 mg capsule Take 1 capsule (20 mg total) by mouth 2 (two) times a day 60 capsule 2    aspirin (ECOTRIN LOW STRENGTH) 81 mg EC tablet Take 81 mg by mouth      celecoxib (CeleBREX) 100 mg capsule Take 1 capsule (100 mg total) by mouth 2 (two) times a day 60 capsule 2    ergocalciferol (VITAMIN D2) 50,000 units Take 1 capsule (50,000 Units total) by mouth once a week 12 capsule 5    Esketamine HCl, 56 MG Dose, (SPRAVATO) 28 MG/DEVICE nasal spray 2 sprays by Intranasal route 2 (two) times a week for 15 days      levothyroxine 75 mcg tablet Take 1 tablet (75 mcg total) by mouth daily 90 tablet 3    meclizine (ANTIVERT) 25 mg tablet Take 1 tablet (25 mg total) by mouth 3 (three) times a day as needed for dizziness for up to 15 days 45 tablet 0    Multiple Vitamins-Minerals (MULTIVITAMIN ADULTS 50+) TABS Take by mouth      rosuvastatin (CRESTOR) 10 MG tablet Take 1 tablet (10 mg total) by mouth daily 90 tablet 3     No current facility-administered medications for this visit.       History Review: The following portions of the patient's history were reviewed and updated as appropriate: allergies, current medications, past family history, past medical history, past social history, past surgical history, and problem list.       OBJECTIVE:     Vital signs in last 24 hours:    There were no vitals filed for this visit.    Mental Status Evaluation:     Appearance age appropriate, casually dressed   Behavior cooperative, mildly anxious   Speech normal  rate, normal volume, normal pitch   Mood improved   Affect normal range and intensity, appropriate   Thought Processes organized, logical, coherent, goal directed, linear, normal rate of thoughts, normal abstract reasoning   Associations intact associations   Thought Content no overt delusions   Perceptual Disturbances: no auditory hallucinations, no visual hallucinations   Abnormal Thoughts  Risk Potential Suicidal ideation - None  Homicidal ideation - None  Potential for aggression - No   Orientation oriented to person, place, time/date, and situation   Memory recent and remote memory grossly intact   Consciousness alert and awake   Attention Span Concentration Span attention span and concentration are age appropriate   Intellect appears to be of average intelligence   Insight intact   Judgement intact   Muscle Strength and  Gait normal muscle strength and normal muscle tone, normal gait and normal balance   Motor activity no abnormal movements   Language no difficulty naming common objects, no difficulty repeating a phrase, no difficulty writing a sentence   Fund of Knowledge adequate knowledge of current events  adequate fund of knowledge regarding past history  adequate fund of knowledge regarding vocabulary    Pain none   Pain Scale 0       Laboratory Results: I have personally reviewed all pertinent laboratory/tests results    Recent Labs:   No visits with results within 1 Month(s) from this visit.   Latest known visit with results is:   Appointment on 02/10/2025   Component Date Value    LDL Direct 02/10/2025 95     TSH [address] [place] [address] 02/10/2025 2.888        Suicide/Homicide Risk Assessment:    The following interventions are recommended: continue medication management. Although patient's acute lethality risk is low, long-term/chronic lethality risk is mildly elevated in the presence of MDD. At the current moment, Kimberly TERRAZAS is future-oriented, forward-thinking, and demonstrates ability to act in a  self-preserving manner as evidenced by volitionally presenting to the clinic today, seeking treatment.To mitigate future risk, patient should adhere to the recommendations of this writer, avoid alcohol/illicit substance use, utilize community-based resources and familiar support and prioritize mental health treatment.     Presently, patient denies suicidal/homicidal ideation in addition to thoughts of self-injury; contracts for safety, see below for risk assessment. At conclusion of evaluation, patient is amenable to the recommendations of this writer including: medication management.  Also, patient is amenable to calling/contacting the outpatient office including this writer if any acute adverse effects of their medication regimen arise in addition to any comments or concerns pertaining to their psychiatric management.  Patient is amenable to calling/contacting crisis and/or attending to the nearest emergency department if their clinical condition deteriorates to assure their safety and stability, stating that they are able to appropriately confide in their psychiatrist regarding their psychiatric state.     At this juncture, inpatient hospitalization is not currently warranted. The following interventions are recommended:   no intervention changes    To mitigate future risk, patient should adhere to the recommendations of this writer, avoid alcohol/illicit substance use, utilize community-based resources and familiar support and prioritize mental health treatment.     Psychotherapy Provided:     Individual psychotherapy provided: Yes     Treatment Plan:    Completed and signed during the session: Not applicable - Treatment Plan not due at this session    Note Share:    This note was not shared with the patient due to reasonable likelihood of causing patient harm    Visit Time    Visit Start Time: 10:30 AM  Visit Stop Time: 11:00 AM  Total Visit Duration:  30 minutes    Alex Espino MD 04/29/25

## 2025-04-30 VITALS — BODY MASS INDEX: 48.82 KG/M2 | HEIGHT: 65 IN | WEIGHT: 293 LBS

## 2025-04-30 NOTE — PROGRESS NOTES
"Initial Nutrition Assessment Form    Patient Name: Kimberly Montanez    YOB: 1960    Sex: Female     Assessment Date: 4/8/2025 Start Time: 9 am Stop Time: 9:30 am Total Minutes: 30     Data:  Present at session: self   Parent Concerns: Patient would like to lose weight and learn about healthy eating.     Medical Dx/Reason for Referral: E66.813 (ICD-10-CM) - Class 3 obesity    Past Medical History:   Diagnosis Date    Anxiety     Depression 2013    Disease of thyroid gland     Heart murmur     Obesity 1979    Osteoarthritis 2024    S/p partial hysterectomy with remaining cervical stump 05/03/2018       Current Outpatient Medications   Medication Sig Dispense Refill    aspirin (ECOTRIN LOW STRENGTH) 81 mg EC tablet Take 81 mg by mouth      celecoxib (CeleBREX) 100 mg capsule Take 1 capsule (100 mg total) by mouth 2 (two) times a day 60 capsule 2    citalopram (CeleXA) 40 mg tablet Take 1 tablet (40 mg total) by mouth daily 30 tablet 2    DULoxetine (CYMBALTA) 20 mg capsule Take 1 capsule (20 mg total) by mouth 2 (two) times a day 60 capsule 2    ergocalciferol (VITAMIN D2) 50,000 units Take 1 capsule (50,000 Units total) by mouth once a week 12 capsule 5    Esketamine HCl, 56 MG Dose, (SPRAVATO) 28 MG/DEVICE nasal spray 2 sprays by Intranasal route 2 (two) times a week for 15 days      levothyroxine 75 mcg tablet Take 1 tablet (75 mcg total) by mouth daily 90 tablet 3    meclizine (ANTIVERT) 25 mg tablet Take 1 tablet (25 mg total) by mouth 3 (three) times a day as needed for dizziness for up to 15 days 45 tablet 0    Multiple Vitamins-Minerals (MULTIVITAMIN ADULTS 50+) TABS Take by mouth      rosuvastatin (CRESTOR) 10 MG tablet Take 1 tablet (10 mg total) by mouth daily 90 tablet 3     No current facility-administered medications for this visit.        Additional Meds/Supplements: Medications reviewed   Special Learning Needs: none   Height: Ht Readings from Last 5 Encounters:   02/25/25 5' 5\" (1.651 m) " "  02/14/25 5' 5\" (1.651 m)   02/11/25 5' 5\" (1.651 m)   11/12/24 5' 5\" (1.651 m)   08/16/24 5' 5\" (1.651 m)     HC Readings from Last 3 Encounters:   No data found for HC      Weight: Wt Readings from Last 5 Encounters:   02/25/25 (!) 155 kg (341 lb)   02/14/25 (!) 154 kg (339 lb)   02/11/25 (!) 154 kg (339 lb)   11/12/24 (!) 158 kg (349 lb)   07/22/24 (!) 152 kg (336 lb)     There is no height or weight on file to calculate BMI.   Recent Weight Change: [x]Yes     []No  Amount: Patient joined [facility] [employer] in February and lost 15#.      Energy Needs: 7002-3772 calories/day (25-30 saige/kg IbW)   Allergies   Allergen Reactions    Effexor [Venlafaxine] Hives     Patient developed hives when taking this medication. Improved with stopping medication and returned when patient retried medication on her own.        Social History     Substance and Sexual Activity   Alcohol Use No       Social History     Tobacco Use   Smoking Status Never   Smokeless Tobacco Never       Who shops? patient   Who cooks? patient   Exercise: Started indoor walking program on phone about 1 week ago.  Walking about 8 minutes at current time.   Prior Counseling? []Yes     [x]No  When:      Why:         Diet Hx:  Breakfast: Yogurt bar, water    Lunch: Main meal:  meat, zucchini noodles, vegetables, water          Dinner: Small snack    Rarely eats out 1-2 x per month if that.  Including all food groups; switched to whole grains and mostly consuming lean protein          Snacks: Yogurt bar        Nutrition Diagnosis:   Overweight/obesity  related to Excess energy intake as evidenced by  BMI more than normative standard for age and sex (obesity-grade III 40+)       Medical Nutrition Therapy Intervention:  [x]Individualized Meal Plan:  weight loss meal plan reviewed.  Discussed caloric intake and portion control.  Reviewed MyPlate. []Understanding Lab Values   []Basic Pathophysiology of Disease []Food/Medication Interactions   [x]Food Diary:  encouraged patient " "to consider keeping a food log.  Can use apps on phone. [x]Exercise:  recommend continue to increase physical activity as able to burn calories.   [x]Lifestyle/Behavior Modification Techniques:  Patient purchase food scale for measuring and weighing foods. []Medication, Mechanism of Action   [x]Label Reading:  reviewed food label for serving size, total calories and added sugars. []Self Blood Glucose Monitoring   [x]Weight/BMI Goals: weight loss []Other -    Other Notes:        Comprehension: []Excellent  []Very Good  [x]Good  []Fair   []Poor    Receptivity: []Excellent  []Very Good  [x]Good  []Fair   []Poor    Expected Compliance: []Excellent  []Very Good  [x]Good  []Fair   []Poor        Goals:  Weight loss 10% x 6 months   2.    Reduce caloric intake, consume no more than 1700 calories/day   3.    Limit added fats to no more than 3 servings per day       Follow up to be determined.  Labs:  CMP  Lab Results   Component Value Date    K 4.2 08/14/2024     08/14/2024    CO2 28 08/14/2024    BUN 13 08/14/2024    CREATININE 1.10 08/14/2024    GLUF 93 08/14/2024    CALCIUM 9.1 08/14/2024    AST 17 08/14/2024    ALT 12 08/14/2024    ALKPHOS 79 08/14/2024    EGFR 53 08/14/2024       BMP  Lab Results   Component Value Date    CALCIUM 9.1 08/14/2024    K 4.2 08/14/2024    CO2 28 08/14/2024     08/14/2024    BUN 13 08/14/2024    CREATININE 1.10 08/14/2024       Lipids  No results found for: \"CHOL\"  Lab Results   Component Value Date    HDL 54 08/14/2024    HDL 61 (H) 11/23/2018    HDL 64 (H) 05/09/2018     Lab Results   Component Value Date    LDLCALC 139 (H) 08/14/2024    LDLCALC 103 (H) 11/23/2018    LDLCALC 191 (H) 05/09/2018     Lab Results   Component Value Date    TRIG 115 08/14/2024    TRIG 116 11/23/2018    TRIG 145 05/09/2018     No results found for: \"CHOLHDL\"    Hemoglobin A1C  Lab Results   Component Value Date    HGBA1C 5.1 08/14/2024       Fasting Glucose  Lab Results   Component Value Date    GLUF " "93 08/14/2024       Insulin     Thyroid  No results found for: \"TSH\", \"P4OGQFC\", \"Y3DZTLS\", \"THYROIDAB\"    Hepatic Function Panel  Lab Results   Component Value Date    ALT 12 08/14/2024    AST 17 08/14/2024    ALKPHOS 79 08/14/2024       Celiac Disease Antibody Panel  No results found for: \"ENDOMYSIAL IGA\", \"GLIADIN IGA\", \"GLIADIN IGG\", \"IGA\", \"TISSUE TRANSGLUT AB\", \"TTG IGA\"   Iron  No results found for: \"IRON\", \"TIBC\", \"FERRITIN\"    Vitamins  No results found for: \"VITAMIN B2\"   No results found for: \"NICOTINAMIDE\", \"NICOTINIC ACID\"   No results found for: \"VITAMINB6\"  No results found for: \"CAFWEYAK79\"  No results found for: \"VITB5\"  No results found for: \"Y5CCUTQQ\"  No results found for: \"THYROGLB\"  No results found for: \"VITAMIN K\"   No results found for: \"25-HYDROXY VIT D\"   No components found for: \"VITAMINE\"     Dipti King RD  [facility]  [address] [place] [id number]      "

## 2025-05-01 ENCOUNTER — OFFICE VISIT (OUTPATIENT)
Dept: PHYSICAL THERAPY | Facility: HOME HEALTHCARE | Age: 65
End: 2025-05-01
Attending: INTERNAL MEDICINE
Payer: COMMERCIAL

## 2025-05-01 DIAGNOSIS — M25.512 BILATERAL SHOULDER PAIN, UNSPECIFIED CHRONICITY: Primary | ICD-10-CM

## 2025-05-01 DIAGNOSIS — M25.511 BILATERAL SHOULDER PAIN, UNSPECIFIED CHRONICITY: Primary | ICD-10-CM

## 2025-05-01 DIAGNOSIS — M17.0 PRIMARY OSTEOARTHRITIS OF BOTH KNEES: ICD-10-CM

## 2025-05-01 PROCEDURE — 97110 THERAPEUTIC EXERCISES: CPT

## 2025-05-01 NOTE — PROGRESS NOTES
"Daily Note     Today's date: 2025  Patient name: Kimberly Montanez  : 1960  MRN: [id number]  Referring provider: Batsheva Gomes DO  Dx:   Encounter Diagnosis     ICD-10-CM    1. Bilateral shoulder pain, unspecified chronicity  M25.511     M25.512       2. Primary osteoarthritis of both knees  M17.0                      Subjective: Pt reports she has pain in her shoulders and gets \" sharp pain\" at times. Pt reports she had pain in her knees earlier this morning.       Objective: See treatment diary below      Assessment: Tolerated treatment fair. Verbal cues needed for correct form with exercises. Progressed to marches and weight with elbow curls today. Pt challenged with supine SLR. Pt reports pain Luis Angel shoulders and Luis Angel knees t/o exercises. Patient would benefit from continued PT      Plan: Continue per plan of care.        Visit Count 7 8 9 5 6      Manuals                    Neuro Re-Ed                         Ther Ex        NuStep for ROM and conditioning  L1  7' L1  8'45\" L1  8' 52\" L1  7' L1   7'   HR/TR 1 x 20ea  1x20 ea  1x20ea 1x20 ea  1x20 ea    Standing hip flex  1x20 Luis Angel  0# 1x20 Luis Angel  0# 1x20 luis angel 1x20 luis angel 1x20 Luis Angel   Standing hip abd  1x20 Luis Angel  1# 2x10 Luis Angel  1# 2x10 luis angel 1x20 luis angel 1x20 Luis Angel    Seated hip add 5\" 2x10  5\" 2x10   5\" 2 x10 5\" 2 x10  5\" 2x10    Seated LAQ 1# 5\" 1x20 Luis Angel  1# 5\" 2x10 Luis Angel  1# 5\" 2x10 luis angel 5\" 2 x10 luis angel 5\" 2x10 Luis Angel    Heel slides  1x20 Luis Angel 1x20 L  1x16 R 1x20 Luis Angel  1x20 Luis Angel 1x20 Luis Angel   QS  5\" 2x10 Luis Angel  5\" 2x10 Luis Angel  5\" 2x10 Luis Angel 5\" 2x10 Luis Angel 5\" 2x10 Luis Angel   SAQ  5\" 2x10 Luis Angel  1# 5\" 2x10 Luis Angel  1# 5\" 2x10 Luis Angel 5\" 2x10 Luis Angel 5\" 2x10 Luis Angel   Supine SLR 1x6 R  1x7 L  1x6 R  1x6 L  1x6 L  1x 6 R 1x6 L  1x6 R   Shld shrugs  1x20 1x20  1x20 1x20 1x20    Retractions  3\" 1x20 3\" 1x 20 3\" 1x20 3\" x20 3\"x20    Pulleys: flex/scap  Flex  5\"x 20 Luis Angel  Scap 5\"x20 Luis Angel Flex /scap 5\" 1x20 ea Luis Angel  Flex   5\"1x20 Luis Angel  Scap  5\"x15 Luis Angel  Flex & scap   5\"x 15 ea Luis Angel  Flex 5\"x 20 Luis Angel  Scap 1x " 20 Luis Angel    Elbow curls  1x20 1x 20 1# 1x20 1x20 1x20    Cane flex 1x20 seated  Seated 1x20 Seated 1x20 Seated 1x 20 Seated 2x10    Table slides  Flex/scap  1x20 ea Luis Angel    Flex/scap  1x20 ea Luis Angel  Flex & Scap  20x ea Luis Angel Flex & scap   X20 ea Luis Angel    Flex/scap  1x20 ea Luis Angel    Hip abd  Seated yellow 1x20 Seated yellow   1x20 Seated yellow   1x20 Seated   Yellow   Seated   Yellow   1x20   Marches    Seated  1x20 Luis Angel                              Ther Activity                        Gait Training                        Modalities

## 2025-05-05 ENCOUNTER — EVALUATION (OUTPATIENT)
Dept: PHYSICAL THERAPY | Facility: HOME HEALTHCARE | Age: 65
End: 2025-05-05
Attending: INTERNAL MEDICINE
Payer: COMMERCIAL

## 2025-05-05 DIAGNOSIS — M17.0 PRIMARY OSTEOARTHRITIS OF BOTH KNEES: ICD-10-CM

## 2025-05-05 DIAGNOSIS — M25.512 BILATERAL SHOULDER PAIN, UNSPECIFIED CHRONICITY: Primary | ICD-10-CM

## 2025-05-05 DIAGNOSIS — M25.511 BILATERAL SHOULDER PAIN, UNSPECIFIED CHRONICITY: Primary | ICD-10-CM

## 2025-05-05 PROCEDURE — 97110 THERAPEUTIC EXERCISES: CPT | Performed by: PHYSICAL THERAPIST

## 2025-05-05 NOTE — PROGRESS NOTES
"PT Re-Evaluation     Today's date: 2025  Patient name: Kimberly Montanez  : 1960  MRN: [id number]  Referring provider: Batsheva Gomes DO  Dx:   Encounter Diagnosis     ICD-10-CM    1. Bilateral shoulder pain, unspecified chronicity  M25.511     M25.512       2. Primary osteoarthritis of both knees  M17.0                      Assessment  Impairments: abnormal or restricted ROM, abnormal movement, activity intolerance, impaired physical strength, lacks appropriate home exercise program, pain with function, scapular dyskinesis, weight-bearing intolerance, poor posture , unable to perform ADL, activity limitations and endurance    Assessment details: UPDATE:  Pt continues to demonstrate slow but steady progression in ROM of B shoulders and B knees.  She continues with strength deficits, and improvements in mobility have not yet translated to improved functional tolerance to ADLs. However, PT to continue with POC and work on further progression so that pt can show improvement with daily activities. Thank you.     Pt Kimberly Montanez is a 64 y.o. who presents to OPPT with s/s consistent with B shoulder and B knee pain due to OA. Pt presents with significant mobility deficits in B shoulders/B knee, decreased LE/UE strength, postural dysfunction, impaired soft tissue mobility, and pain with functional activities. Pt reporting tolerance < 10 minutes to standing/walking, modifications to washing, dressing, cooking, cleaning, and disrupted sleep due to deficits. Pt would benefit from skilled therapy services to address outlined impairments, work towards goals, and restore pts PLOF. Thank you!   Understanding of Dx/Px/POC: good     Prognosis: good    Goals  STGs to be achieved in 4 weeks:  -Pt to demonstrate reduced subjective pain rating \"at worst\" by at least 2-3 points from Initial Eval to allow for reduced pain with ADLs and improved functional activity tolerance. - Ongoing   -Pt to demonstrate B knee ROM " improved > 20* flexion in order to maximize joint mobility and function and allow for progression of exercise program and achievement of goals. - MET   -Pt to demonstrate B shoulder ROM improved > 20* flexion/abduction in order to maximize joint mobility and function and allow for progression of exercise program and achievement of goals. - progressing but not met   -Pt to demonstrate increased MMT of B UE/LE  by at least 1/2 grade in order to improve safety and stability with ADLs and functional mobility. - progressing but not met     LTGs to be achieved upon discharge:   -Pt will be I with HEP in order to continue to improve quality of life and independence and reduce risk for re-injury.   -Pt to demonstrate return to activities of daily living without limiations or restrictions.   -Pt will return to lifting/carrying > 5# to help facilitate return to community activities independently   -Pt to demonstrate return to ambulation with AD > 20 minutes to return to community based activities without limitations.   -Pt to demonstrate improved function as noted by achieving or exceeding predicted score on FOTO outcomes assessment tool.       Plan  Patient would benefit from: skilled physical therapy  Planned modality interventions: thermotherapy: hydrocollator packs    Planned therapy interventions: manual therapy, neuromuscular re-education, patient education, postural training, therapeutic exercise, therapeutic activities, home exercise program, flexibility and functional ROM exercises    Frequency: 2x week  Duration in weeks: 12  Plan of Care beginning date: 5/5/2025  Plan of Care expiration date: 7/28/2025  Treatment plan discussed with: patient and referring physician        Subjective Evaluation    History of Present Illness  Mechanism of injury: UPDATE:  Pt reporting that she is seeing improvement with therapy and would like to continue. She states that she is participting in a seated walking prog    Pt reporting  that she has been having knee and shoulder pain since the fall.  She had x-rays taken which were (+) for severe joint OA B knees and mild to moderate OA B shoulders. Pt ended up in ED on 25 due to severe pain and immobility.  PCP follow up with referral to OPPT for conservative management of s/s.     Quality of life: good    Patient Goals  Patient goals for therapy: decreased pain, improved balance, increased motion, increased strength and independence with ADLs/IADLs    Pain  At best pain ratin  At worst pain ratin  Quality: dull ache, grinding, tight and throbbing  Relieving factors: medications  Aggravating factors: standing, walking, stair climbing, lifting and overhead activity  Progression: no change    Social Support  Steps to enter house: yes      Diagnostic Tests  X-ray: abnormal  Treatments  Current treatment: physical therapy      Objective     Active Range of Motion   Left Shoulder   Flexion: 120 degrees   Abduction: 75 degrees   External rotation 0°: Left shoulder active external rotation at 0 degrees: 50%   Internal rotation 0°: WFL    Right Shoulder   Flexion: 112 degrees   Abduction: 82 degrees   External rotation 0°: Right shoulder active external rotation at 0 degrees: 50%   Internal rotation 0°: WFL  Left Knee   Flexion: 90 degrees   Extension: 0 degrees     Right Knee   Flexion: 95 degrees   Extension: 0 degrees     Strength/Myotome Testing     Left Shoulder     Planes of Motion   Flexion: 3-   Abduction: 3-   External rotation at 0°: 3   Internal rotation at 0°: 3+     Right Shoulder     Planes of Motion   Flexion: 3-   Abduction: 3-   External rotation at 0°: 3   Internal rotation at 0°: 3+     Left Knee   Flexion: 3+  Extension: 3    Right Knee   Flexion: 3+  Extension: 3    Ambulation   Weight-Bearing Status   Assistive device used: wheeled walker    Additional Weight-Bearing Status Details  Tolerance x 10 minutes with AD     Ambulation: Stairs   Pattern:  "non-reciprocal  Railings: one rail  Pattern: non-reciprocal  Railings: one rail    Observational Gait   Gait: antalgic   Decreased walking speed and stride length.     Functional Assessment        Comments  Standing tolerance < 10 minutes  Sitting for cooking/dishes   Use of shower chair   Lifting tolerance < 5#   Reaching ability - shoulder height only         Visit Count 7 8 9 1 - RE 6      Manuals 4/24 4/28 5/1 5/5 4/21                   Neuro Re-Ed                         Ther Ex        NuStep for ROM and conditioning  L1  7' L1  8'45\" L1  8' 52\" L1 8 min  L1   7'   HR/TR 1 x 20ea  1x20 ea  1x20ea X 20  1x20 ea    Standing hip flex  1x20 Luis Angel  0# 1x20 Luis Angel  0# 1x20 luis angel 0# x 20 luis angel  1x20 Luis Angel   Standing hip abd  1x20 Luis Angel  1# 2x10 Luis Angel  1# 2x10 luis angel 1# x 20 luis angel  1x20 Luis Angel    Seated hip add 5\" 2x10  5\" 2x10   5\" 2 x10 5\" x 20  5\" 2x10    Seated LAQ 1# 5\" 1x20 Luis Angel  1# 5\" 2x10 Luis Angel  1# 5\" 2x10 luis angel 1# x 20 luis angel  5\" 2x10 Luis Angel    Heel slides  1x20 Luis Angel 1x20 L  1x16 R 1x20 Luis Angel   1x20 Luis Angel   QS  5\" 2x10 Luis Angel  5\" 2x10 Luis Angel  5\" 2x10 Luis Angel 5\" x 20 luis angel  5\" 2x10 Luis Angel   SAQ  5\" 2x10 Luis Angel  1# 5\" 2x10 Luis Angel  1# 5\" 2x10 Luis Angel  5\" 2x10 Luis Angel   Supine SLR 1x6 R  1x7 L  1x6 R  1x6 L  X 7 R   X 7 L  1x6 L  1x6 R   Shld shrugs  1x20 1x20  1x20 X 20  1x20    Retractions  3\" 1x20 3\" 1x 20 3\" 1x20 X 20  3\"x20    Pulleys: flex/scap  Flex  5\"x 20 Luis Angel  Scap 5\"x20 Luis Angel Flex /scap 5\" 1x20 ea Luis Angel  Flex   5\"1x20 Luis Angel  Scap  5\"x15 Luis Angel   Flex 5\"x 20 Luis Angel  Scap 1x 20 Luis Angel    Elbow curls  1x20 1x 20 1# 1x20 1# x 20  1x20    Cane flex 1x20 seated  Seated 1x20 Seated 1x20 X 20  Seated 2x10    Table slides  Flex/scap  1x20 ea Luis Angel    Flex/scap  1x20 ea Luis Angel  Flex & Scap  20x ea Luis Angel  Flex/scap  1x20 ea Luis Angel    Hip abd  Seated yellow 1x20 Seated yellow   1x20 Seated yellow   1x20 Seated   Yellow   X 20  Seated   Yellow   1x20   Marches    Seated  1x20 Luis Angel  Seated   1# x 10                              Ther Activity                        Gait Training                      "   Modalities

## 2025-05-06 ENCOUNTER — APPOINTMENT (OUTPATIENT)
Dept: PHYSICAL THERAPY | Facility: HOME HEALTHCARE | Age: 65
End: 2025-05-06
Attending: INTERNAL MEDICINE
Payer: COMMERCIAL

## 2025-05-08 ENCOUNTER — OFFICE VISIT (OUTPATIENT)
Dept: PHYSICAL THERAPY | Facility: HOME HEALTHCARE | Age: 65
End: 2025-05-08
Attending: INTERNAL MEDICINE
Payer: COMMERCIAL

## 2025-05-08 DIAGNOSIS — M17.0 PRIMARY OSTEOARTHRITIS OF BOTH KNEES: ICD-10-CM

## 2025-05-08 DIAGNOSIS — M25.512 BILATERAL SHOULDER PAIN, UNSPECIFIED CHRONICITY: Primary | ICD-10-CM

## 2025-05-08 DIAGNOSIS — M25.511 BILATERAL SHOULDER PAIN, UNSPECIFIED CHRONICITY: Primary | ICD-10-CM

## 2025-05-08 PROCEDURE — 97110 THERAPEUTIC EXERCISES: CPT

## 2025-05-08 NOTE — PROGRESS NOTES
"Daily Note     Today's date: 2025  Patient name: Kimberly Montanez  : 1960  MRN: [id number]  Referring provider: Batsheva Gomes DO  Dx:   Encounter Diagnosis     ICD-10-CM    1. Bilateral shoulder pain, unspecified chronicity  M25.511     M25.512       2. Primary osteoarthritis of both knees  M17.0                  Subjective: Pt reports she has more pain in her knees than her shoulders today.       Objective: See treatment diary below      Assessment: Tolerated treatment fair. Verbal cues needed for correct form with exercises. Progressed to standing marches today. Pt reports pain in her R shoulder during pulleys scaption and was unable to complete today. Pt reports pain Luis Angel shoulders and Luis Angel knees t/o exercises. Patient would benefit from continued PT      Plan: Continue per plan of care.        Visit Count 7 8 9 1 - RE 2      Manuals                    Neuro Re-Ed                         Ther Ex        NuStep for ROM and conditioning  L1  7' L1  8'45\" L1  8' 52\" L1 8 min  L1   8'   HR/TR 1 x 20ea  1x20 ea  1x20ea X 20  1x20 ea    Standing hip flex  1x20 Luis Angel  0# 1x20 Luis Angel  0# 1x20 luis angel 0# x 20 luis angel  0# 1x20 Luis Angel   Standing hip abd  1x20 Luis Angel  1# 2x10 Luis Angel  1# 2x10 luis angel 1# x 20 luis angel  1# 1x20 Luis Angel    Seated hip add 5\" 2x10  5\" 2x10   5\" 2 x10 5\" x 20  5\" 2x10    Seated LAQ 1# 5\" 1x20 Luis Angel  1# 5\" 2x10 Luis Angel  1# 5\" 2x10 luis angel 1# x 20 luis angel  1# 5\" 2x10 Luis Angel    Heel slides  1x20 Luis Angel 1x20 L  1x16 R 1x20 Luis Angel   1x20 Luis Angel   QS  5\" 2x10 Luis Angel  5\" 2x10 Luis Angel  5\" 2x10 Luis Angel 5\" x 20 luis angel  5\" 2x10 Luis Angel   SAQ  5\" 2x10 Luis Angel  1# 5\" 2x10 Luis Angel  1# 5\" 2x10 Luis Angel  1# 5\" 2x10 Luis Angel   Supine SLR 1x6 R  1x7 L  1x6 R  1x6 L  X 7 R   X 7 L  1x10 L  1x6 R   Shld shrugs  1x20 1x20  1x20 X 20  1x20    Retractions  3\" 1x20 3\" 1x 20 3\" 1x20 X 20  3\"x20    Pulleys: flex/scap  Flex  5\"x 20 Luis Angel  Scap 5\"x20 Luis Angel Flex /scap 5\" 1x20 ea Luis Angel  Flex   5\"1x20 Luis Angel  Scap  5\"x15 Luis Angel   Flex 5\" 1x20 Luis Angel  Scap 5\" 1x 20 Luis Angel    Elbow curls  1x20 1x 20 1# 1x20 1# " x 20  1# 1x20    Cane flex 1x20 seated  Seated 1x20 Seated 1x20 X 20  Seated 2x10    Table slides  Flex/scap  1x20 ea Luis Angel    Flex/scap  1x20 ea Luis Angel  Flex & Scap  20x ea Luis Angel  Flex  1x20 ea Luis Angel   Scap hold    Hip abd  Seated yellow 1x20 Seated yellow   1x20 Seated yellow   1x20 Seated   Yellow   X 20  Seated   Yellow   1x20   Marches    Seated  1x20 Luis Angel  Seated   1# x 10   Standing   1#  x20 Luis Angel                           Ther Activity                        Gait Training                        Modalities

## 2025-05-11 LAB
APOB+LDLR+PCSK9 GENE MUT ANL BLD/T: NOT DETECTED
BRCA1+BRCA2 DEL+DUP + FULL MUT ANL BLD/T: NOT DETECTED
MLH1+MSH2+MSH6+PMS2 GN DEL+DUP+FUL M: NOT DETECTED

## 2025-05-12 ENCOUNTER — OFFICE VISIT (OUTPATIENT)
Dept: PHYSICAL THERAPY | Facility: HOME HEALTHCARE | Age: 65
End: 2025-05-12
Attending: INTERNAL MEDICINE
Payer: COMMERCIAL

## 2025-05-12 DIAGNOSIS — M25.511 BILATERAL SHOULDER PAIN, UNSPECIFIED CHRONICITY: Primary | ICD-10-CM

## 2025-05-12 DIAGNOSIS — M17.0 PRIMARY OSTEOARTHRITIS OF BOTH KNEES: ICD-10-CM

## 2025-05-12 DIAGNOSIS — M25.512 BILATERAL SHOULDER PAIN, UNSPECIFIED CHRONICITY: Primary | ICD-10-CM

## 2025-05-12 PROCEDURE — 97110 THERAPEUTIC EXERCISES: CPT | Performed by: PHYSICAL THERAPIST

## 2025-05-12 NOTE — PROGRESS NOTES
"Daily Note     Today's date: 2025  Patient name: Kimberly Montanez  : 1960  MRN: [id number]  Referring provider: Batsheva Gomes DO  Dx:   Encounter Diagnosis     ICD-10-CM    1. Bilateral shoulder pain, unspecified chronicity  M25.511     M25.512       2. Primary osteoarthritis of both knees  M17.0                      Subjective: Pt reporting that she didn't sleep much last night because of the shoulders.       Objective: See treatment diary below      Assessment: Pt remains with significant limitations in B shoulder mobility; unable to complete pullys into abduction due to increased pain vs stretch noted. Will work on gradual progression as tolerable.     Plan: Continue per plan of care.        Visit Count 3 8 9 1 - RE 2      Manuals                    Neuro Re-Ed                         Ther Ex        NuStep for ROM and conditioning  L1 10 min  L1  8'45\" L1  8' 52\" L1 8 min  L1   8'   HR/TR X 20 1x20 ea  1x20ea X 20  1x20 ea    Standing hip flex  0#   X 20 ea  0# 1x20 Luis Angel  0# 1x20 luis angel 0# x 20 luis angel  0# 1x20 Luis Angel   Standing hip abd  1#   X 20 ea  1# 2x10 Luis Angel  1# 2x10 luis angel 1# x 20 luis angel  1# 1x20 Luis Angel    Seated hip add 5\" x 20  5\" 2x10   5\" 2 x10 5\" x 20  5\" 2x10    Seated LAQ 5\"  x 20 1# 5\" 2x10 Luis Angel  1# 5\" 2x10 luis angel 1# x 20 luis angel  1# 5\" 2x10 Luis Angel    Heel slides   1x20 L  1x16 R 1x20 Luis Angel   1x20 Luis Angel   QS  5\" x 20  5\" 2x10 Luis Angel  5\" 2x10 Luis Angel 5\" x 20 luis angel  5\" 2x10 Luis Angel   SAQ  2 x 10  1# 5\" 2x10 Luis Angel  1# 5\" 2x10 Luis Angel  1# 5\" 2x10 Luis Angel   Supine SLR X 10 luis angel   1x6 R  1x6 L  X 7 R   X 7 L  1x10 L  1x6 R   Shld shrugs  X 20  1x20  1x20 X 20  1x20    Retractions  X 20  3\" 1x 20 3\" 1x20 X 20  3\"x20    Pulleys: flex/scap  Flex only   X 15  Flex /scap 5\" 1x20 ea Luis Angel  Flex   5\"1x20 Luis Angel  Scap  5\"x15 Luis Angel   Flex 5\" 1x20 Luis Angel  Scap 5\" 1x 20 Luis Angel    Elbow curls  1# x 20  1x 20 1# 1x20 1# x 20  1# 1x20    Cane flex Seated x 20  Seated 1x20 Seated 1x20 X 20  Seated 2x10    Table slides   Flex/scap  1x20 ea Luis Angel  Flex & " Scap  20x ea Luis Angel  Flex  1x20 ea Luis Angel   Scap hold    Hip abd   Seated yellow   1x20 Seated yellow   1x20 Seated   Yellow   X 20  Seated   Yellow   1x20   Marches    Seated  1x20 Luis Angel  Seated   1# x 10   Standing   1#  x20 Luis Angel                           Ther Activity                        Gait Training                        Modalities

## 2025-05-14 ENCOUNTER — OFFICE VISIT (OUTPATIENT)
Dept: FAMILY MEDICINE CLINIC | Facility: CLINIC | Age: 65
End: 2025-05-14
Payer: COMMERCIAL

## 2025-05-14 VITALS
BODY MASS INDEX: 48.82 KG/M2 | TEMPERATURE: 96.8 F | DIASTOLIC BLOOD PRESSURE: 78 MMHG | OXYGEN SATURATION: 98 % | WEIGHT: 293 LBS | HEIGHT: 65 IN | SYSTOLIC BLOOD PRESSURE: 126 MMHG | RESPIRATION RATE: 18 BRPM | HEART RATE: 76 BPM

## 2025-05-14 DIAGNOSIS — M17.0 OSTEOARTHRITIS OF BOTH KNEES, UNSPECIFIED OSTEOARTHRITIS TYPE: ICD-10-CM

## 2025-05-14 DIAGNOSIS — E78.5 HYPERLIPIDEMIA, UNSPECIFIED HYPERLIPIDEMIA TYPE: ICD-10-CM

## 2025-05-14 DIAGNOSIS — M25.512 BILATERAL SHOULDER PAIN, UNSPECIFIED CHRONICITY: Primary | ICD-10-CM

## 2025-05-14 DIAGNOSIS — M25.511 BILATERAL SHOULDER PAIN, UNSPECIFIED CHRONICITY: Primary | ICD-10-CM

## 2025-05-14 DIAGNOSIS — R60.9 EDEMA, UNSPECIFIED TYPE: ICD-10-CM

## 2025-05-14 DIAGNOSIS — M17.0 PRIMARY OSTEOARTHRITIS OF BOTH KNEES: ICD-10-CM

## 2025-05-14 DIAGNOSIS — E03.9 HYPOTHYROIDISM, UNSPECIFIED TYPE: ICD-10-CM

## 2025-05-14 PROCEDURE — 99214 OFFICE O/P EST MOD 30 MIN: CPT | Performed by: INTERNAL MEDICINE

## 2025-05-14 RX ORDER — HYDROCHLOROTHIAZIDE 25 MG/1
TABLET ORAL
Qty: 30 TABLET | Refills: 0 | Status: SHIPPED | OUTPATIENT
Start: 2025-05-14

## 2025-05-14 RX ORDER — CELECOXIB 100 MG/1
100 CAPSULE ORAL 2 TIMES DAILY
Qty: 60 CAPSULE | Refills: 2 | Status: SHIPPED | OUTPATIENT
Start: 2025-05-14

## 2025-05-14 NOTE — ASSESSMENT & PLAN NOTE
Orders:    celecoxib (CeleBREX) 100 mg capsule; Take 1 capsule (100 mg total) by mouth 2 (two) times a day  Pt is going to PT and uses celebrex with some benefit

## 2025-05-15 ENCOUNTER — OFFICE VISIT (OUTPATIENT)
Dept: PHYSICAL THERAPY | Facility: HOME HEALTHCARE | Age: 65
End: 2025-05-15
Attending: INTERNAL MEDICINE
Payer: COMMERCIAL

## 2025-05-15 DIAGNOSIS — M25.511 BILATERAL SHOULDER PAIN, UNSPECIFIED CHRONICITY: Primary | ICD-10-CM

## 2025-05-15 DIAGNOSIS — M25.512 BILATERAL SHOULDER PAIN, UNSPECIFIED CHRONICITY: Primary | ICD-10-CM

## 2025-05-15 DIAGNOSIS — M17.0 PRIMARY OSTEOARTHRITIS OF BOTH KNEES: ICD-10-CM

## 2025-05-15 PROCEDURE — 97110 THERAPEUTIC EXERCISES: CPT | Performed by: PHYSICAL THERAPIST

## 2025-05-15 NOTE — PROGRESS NOTES
"Daily Note     Today's date: 5/15/2025  Patient name: Kimberly Montanez  : 1960  MRN: [id number]  Referring provider: Batsheva Gomes DO  Dx:   Encounter Diagnosis     ICD-10-CM    1. Bilateral shoulder pain, unspecified chronicity  M25.511     M25.512       2. Primary osteoarthritis of both knees  M17.0                      Subjective: Pt states that the shoulders are still really bothering her.       Objective: See treatment diary below      Assessment: Exercise reps modified to pt tolerance each session dependent on pain levels given day. She is willing to participate in all exercises despite elevated pain, however progression is slow at this time.  Pt would benefit from continued therapy to address chronic deficits.       Plan: Continue per plan of care.        Visit Count 3 4 9 1 - RE 2      Manuals 5/12 5/15 5/1 5/5 5/8                   Neuro Re-Ed                         Ther Ex        NuStep for ROM and conditioning  L1 10 min  L2 10 minutes  L1  8' 52\" L1 8 min  L1   8'   HR/TR X 20 X 20  1x20ea X 20  1x20 ea    Standing hip flex  0#   X 20 ea  0#   X 20 ea  0# 1x20 luis angel 0# x 20 luis angel  0# 1x20 Luis Angel   Standing hip abd  1#   X 20 ea  1#   X 20 ea  1# 2x10 luis angel 1# x 20 luis angel  1# 1x20 Luis Angel    Seated hip add 5\" x 20  5\" x 20  5\" 2 x10 5\" x 20  5\" 2x10    Seated LAQ 5\"  x 20 5\" x 20  1# 5\" 2x10 luis angel 1# x 20 luis angel  1# 5\" 2x10 Luis Angel    Heel slides    1x20 Luis Angel   1x20 Luis Angel   QS  5\" x 20  5\" x 20  5\" 2x10 Luis Angel 5\" x 20 luis angel  5\" 2x10 Luis Angel   SAQ  2 x 10  X 20  1# 5\" 2x10 Luis Angel  1# 5\" 2x10 Luis Angel   Supine SLR X 10 luis angel  X 10  1x6 R  1x6 L  X 7 R   X 7 L  1x10 L  1x6 R   Shld shrugs  X 20  X 20  1x20 X 20  1x20    Retractions  X 20  X 20  3\" 1x20 X 20  3\"x20    Pulleys: flex/scap  Flex only   X 15  Flex/Scap   X 10 ea  Flex   5\"1x20 Luis Angel  Scap  5\"x15 Luis Angel   Flex 5\" 1x20 Luis Angel  Scap 5\" 1x 20 Luis Angel    Elbow curls  1# x 20  0# x 20  1# 1x20 1# x 20  1# 1x20    Cane flex Seated x 20   Seated 1x20 X 20  Seated 2x10    Table slides   Flex x 10 ea  " Flex & Scap  20x ea Luis Angel  Flex  1x20 ea Luis Angel   Scap hold    Hip abd    Seated yellow   1x20 Seated   Yellow   X 20  Seated   Yellow   1x20   Marches   Standing   1# x 10 Seated  1x20 Luis Angel  Seated   1# x 10   Standing   1#  x20 Luis Angel                           Ther Activity                        Gait Training                        Modalities

## 2025-05-19 ENCOUNTER — OFFICE VISIT (OUTPATIENT)
Dept: PHYSICAL THERAPY | Facility: HOME HEALTHCARE | Age: 65
End: 2025-05-19
Attending: INTERNAL MEDICINE
Payer: COMMERCIAL

## 2025-05-19 DIAGNOSIS — M25.512 BILATERAL SHOULDER PAIN, UNSPECIFIED CHRONICITY: Primary | ICD-10-CM

## 2025-05-19 DIAGNOSIS — M17.0 PRIMARY OSTEOARTHRITIS OF BOTH KNEES: ICD-10-CM

## 2025-05-19 DIAGNOSIS — M25.511 BILATERAL SHOULDER PAIN, UNSPECIFIED CHRONICITY: Primary | ICD-10-CM

## 2025-05-19 PROCEDURE — 97110 THERAPEUTIC EXERCISES: CPT

## 2025-05-19 NOTE — PROGRESS NOTES
"Daily Note     Today's date: 2025  Patient name: Kimberly Montanez  : 1960  MRN: [id number]  Referring provider: Batsheva Gomes DO  Dx:   Encounter Diagnosis     ICD-10-CM    1. Bilateral shoulder pain, unspecified chronicity  M25.511     M25.512       2. Primary osteoarthritis of both knees  M17.0                      Subjective: Pt reports she went miniature golfing with her granddaughter on Saturday and had a lot more pain in her shoulders on . Pt reports she has pain in her shoulders and grinding in her knees today.       Objective: See treatment diary below      Assessment: Tolerated treatment fair. Some verbal cues needed for correct form with exercises.  Pt reports pain Luis Angel shoulders and Luis Angel knees t/o exercises and pain Luis Angel shoulders during pulleys scaption. Patient would benefit from continued PT      Plan: Continue per plan of care.        Visit Count 3 4 5 1 - RE 2      Manuals 5/12 5/15 5/19 5/5 5/8                   Neuro Re-Ed                         Ther Ex        NuStep for ROM and conditioning  L1 10 min  L2 10 minutes  L2   10' L1 8 min  L1   8'   HR/TR X 20 X 20  1x20ea X 20  1x20 ea    Standing hip flex  0#   X 20 ea  0#   X 20 ea  0# 1x20 luis angel 0# x 20 luis angel  0# 1x20 Luis Angel   Standing hip abd  1#   X 20 ea  1#   X 20 ea  1# 2x10 luis angel 1# x 20 luis angel  1# 1x20 Luis Angel    Seated hip add 5\" x 20  5\" x 20  5\" 2 x10 5\" x 20  5\" 2x10    Seated LAQ 5\"  x 20 5\" x 20  1# 5\" 2x10 luis angel 1# x 20 luis angel  1# 5\" 2x10 Luis Angel    Heel slides    1x20 Luis Angel   1x20 Luis Angel   QS  5\" x 20  5\" x 20  5\" 2x10 Luis Angel 5\" x 20 luis angel  5\" 2x10 Luis Angel   SAQ  2 x 10  X 20  1# 5\" 2x10 Luis Angel  1# 5\" 2x10 Luis Angel   Supine SLR X 10 luis angel  X 10  1x10 Luis Angel   X 7 R   X 7 L  1x10 L  1x6 R   Shld shrugs  X 20  X 20  1x20 X 20  1x20    Retractions  X 20  X 20  3\" 1x20 X 20  3\"x20    Pulleys: flex/scap  Flex only   X 15  Flex/Scap   X 10 ea  Flex/scap  X 10 ea   Flex 5\" 1x20 Luis Angel  Scap 5\" 1x 20 Luis Angel    Elbow curls  1# x 20  0# x 20  1# 1x20 1# x 20  1# 1x20    Cane " flex Seated x 20   Seated 1x20 X 20  Seated 2x10    Table slides   Flex x 10 ea  Flex   20x  Luis Angel  Flex  1x20 ea Luis Angel   Scap hold    Hip abd    Seated yellow   1x20 Seated   Yellow   X 20  Seated   Yellow   1x20   Marches   Standing   1# x 10 Seated  1# 1x20 Luis Angel  Seated   1# x 10   Standing   1#  x20 Luis Angel                           Ther Activity                        Gait Training                        Modalities

## 2025-05-20 ENCOUNTER — OFFICE VISIT (OUTPATIENT)
Age: 65
End: 2025-05-20

## 2025-05-20 DIAGNOSIS — Z91.89 AT RISK FOR LONG QT SYNDROME: ICD-10-CM

## 2025-05-20 DIAGNOSIS — F43.21 GRIEF: ICD-10-CM

## 2025-05-20 DIAGNOSIS — F41.1 GAD (GENERALIZED ANXIETY DISORDER): ICD-10-CM

## 2025-05-20 DIAGNOSIS — F33.2 MAJOR DEPRESSIVE DISORDER, RECURRENT SEVERE WITHOUT PSYCHOTIC FEATURES (HCC): Primary | ICD-10-CM

## 2025-05-20 NOTE — PSYCH
(Indirect Supervision -- No Charge)  MEDICATION MANAGEMENT NOTE        [facility] - [facility]YCH[facility]RIC [facility]      Name and Date of Birth:  Kimberly Montanez 65 y.o. 1960 MRN: [id number]    Insurance: Payor: [employer] [facility] / Plan: [employer] / Product Type: [facility] /     Date of Visit: May 20, 2025    Reason for Visit: Medication Management    No medication changes at this time. Order EKG for Celexa and Cymbalta combination to monitor QT-c prolongation being used for treatment resistant depression. No evidence for serotonin syndrome presently. Will discuss increasing Cymbalta and decreasing Celexa at next follow up in two weeks to attempt monotherapy VS combination therapy given increased risk of SS and QT prolongation.     Assessment & Plan  Major depressive disorder, recurrent severe without psychotic features (HCC)  Celexa 40mg once per day   PARQ completed including serotonin syndrome, SIADH, worsening depression, suicidality, induction of nakul, GI upset, headaches, activation, sexual side effects, sedation, potential drug interactions, and others.   Cymbalta 20mg DR twice per day  PARQ completed including serotonin syndrome, SIADH, worsened depression/suicidality, induction of nakul, blood pressure changes and GI distress, weight gain, sexual side effects, insomnia, sedation, potential for drug interactions, and others.      Orders:    POCT ECG    KRISTIN (generalized anxiety disorder)  Celexa 40mg once per day   PARQ completed including serotonin syndrome, SIADH, worsening depression, suicidality, induction of nakul, GI upset, headaches, activation, sexual side effects, sedation, potential drug interactions, and others.   Cymbalta 20mg DR twice per day  PARQ completed including serotonin syndrome, SIADH, worsened depression/suicidality, induction of nakul, blood pressure changes and GI distress, weight gain, sexual side effects, insomnia,  sedation, potential for drug interactions, and others.              Treatment Recommendations/Precautions:    Psychotropic Medication Regiment: Cymbalta 40mg BID and Celexa 40mg qAM  Psychotherapy: In Psychotherapy  Laboratory: N/A    Next Appointment: 2 week follow up  Aware of 24 hour and weekend coverage for urgent situations accessed by calling [facility] main practice number    Medications Risks/Benefits:      Risks, Benefits And Possible Side Effects Of Medications:    Risks, benefits, and possible side effects of medications explained to Kimberly TERRAZAS and she verbalizes understanding and agreement for treatment.    Controlled Medication Discussion:     Not applicable    SUBJECTIVE:    Kimberly TERRAZAS is seen today for a follow up for Major Depressive Disorder and Generalized Anxiety Disorder.     In the three week interval since her last follow up, Karen endorses feeling less depressed than she had been previously after spending the following weekend with her granddaughter and losing 24 pounds over the past month with her weight management group. She credits increased activity and intermittent fasting to be the reason for for the patient losing weight. She expresses feelings of pride about herself recently due to the weight loss, and has experienced increased energy and motivation. She continues to endorse frustration with the relationship with her daughter Katrin, but is hopeful that it will improve and that she will be able to spend more time with her grandchild. She discloses to writer concern regarding loneliness and frustration at not being able to meet any single men her age. She is receptive to motivational interviewing about continuing to expand her social connections. She otherwise has no acute concerns at this time, and is agreeable to follow up in several weeks. Agreeable to EKG to monitor drug-drug interaction with Cymbalta and Celexa being used for treatment resistant depression.    She  denies suicidal ideation, intent or plan at present; denies homicidal ideation, intent or plan at present.    She denies auditory hallucinations, denies visual hallucinations, has no overt delusions noted.    She denies any side effects from medications.    HPI ROS Appetite Changes and Sleep:     She reports normal sleep, decreased appetite, (patient attempting to lose weight), improving energy    Current Rating Scores:     None completed today.    Review Of Systems:      Constitutional negative   ENT negative   Cardiovascular negative   Respiratory negative   Gastrointestinal negative   Genitourinary negative   Musculoskeletal shoulder pain, knee pain, and arthralgias   Integumentary negative   Neurological negative   Endocrine negative   Other Symptoms none, all other systems are negative       Past Psychiatric History: (unchanged information from previous note copied and updated)     Previous inpatient psychiatric admissions: denies.  Previous inpatient/outpatient substance abuse rehabilitation: denies.  Present/previous outpatient psychiatric treatment: denies.  Present/previous psychotherapy: In therapy.  History of suicidal attempts/gestures: Denies.  History of violence/aggressive behaviors: denies.  Present/previous psychotropic medication use: Celexa, Effexor (caused rash twice), and Wellbutrin     Traumatic History: (unchanged information from previous note copied and updated)     Abuse:emotional and verbal  Other Traumatic Events:  defer     Substance Abuse History:     Patient denies history of alcohol, illict substance, or tobacco abuse.     Kimberly TERRAZAS does not apear under the influence or withdrawal of any psychoactive substance throughout today's examination.      Social History:     Educational History:  Academic history: high school diploma/GED  Marital history:   Social support system: parents, extended family, and friend(s)  Residential history: Resides in home; lives by herself  Vocational  History: works as healthcare aid  Access to guns/weapons: none  Legal History: denies     Family Psychiatric History:      Suicide attempts in all three of her children; no hx of bipolar or schizophrenia in family     Past Medical History:    Past Medical History:   Diagnosis Date    Anxiety     Depression 2013    Disease of thyroid gland     Heart murmur     Obesity 1979    Osteoarthritis 2024    S/p partial hysterectomy with remaining cervical stump 05/03/2018        Past Surgical History:   Procedure Laterality Date    TOTAL ABDOMINAL HYSTERECTOMY      TUBAL LIGATION      WISDOM TOOTH EXTRACTION       Allergies[1]    Current Outpatient Medications:    Current Medications[2]    History Review: The following portions of the patient's history were reviewed and updated as appropriate: allergies, current medications, past family history, past medical history, past social history, past surgical history, and problem list.       OBJECTIVE:     Vital signs in last 24 hours:    There were no vitals filed for this visit.    Mental Status Evaluation:    Appearance age appropriate, casually dressed   Behavior cooperative, mildly anxious   Speech normal rate, normal volume, normal pitch   Mood dysphoric, anxious   Affect normal range and intensity, appropriate   Thought Processes organized, logical, coherent, goal directed, linear, normal rate of thoughts, normal abstract reasoning   Associations intact associations   Thought Content no overt delusions   Perceptual Disturbances: no auditory hallucinations, no visual hallucinations   Abnormal Thoughts  Risk Potential Suicidal ideation - None  Homicidal ideation - None  Potential for aggression - No   Orientation oriented to person, place, time/date, and situation   Memory recent and remote memory grossly intact   Consciousness alert and awake   Attention Span Concentration Span attention span and concentration are age appropriate   Intellect appears to be of average  intelligence   Insight intact   Judgement intact   Muscle Strength and  Gait normal muscle strength and normal muscle tone, normal gait and normal balance   Motor activity no abnormal movements   Language no difficulty naming common objects, no difficulty repeating a phrase, no difficulty writing a sentence   Fund of Knowledge adequate knowledge of current events  adequate fund of knowledge regarding past history  adequate fund of knowledge regarding vocabulary    Pain none   Pain Scale 0       Laboratory Results: I have personally reviewed all pertinent laboratory/tests results    Recent Labs:   Appointment on 04/29/2025   Component Date Value    JETT SYNDROME DNA ANA* 04/29/2025 Not Detected     HEREDITARY BREAST & OVAR* 04/29/2025 Not Detected     FAMILIAL HYPERCHOLESTERO* 04/29/2025 Not Detected        Suicide/Homicide Risk Assessment:    The following interventions are recommended: continue medication management. Although patient's acute lethality risk is low, long-term/chronic lethality risk is mildly elevated in the presence of MDD, KRISTIN. At the current moment, Kimberly TERRAZAS is future-oriented, forward-thinking, and demonstrates ability to act in a self-preserving manner as evidenced by volitionally presenting to the clinic today, seeking treatment.To mitigate future risk, patient should adhere to the recommendations of this writer, avoid alcohol/illicit substance use, utilize community-based resources and familiar support and prioritize mental health treatment.     Presently, patient denies suicidal/homicidal ideation in addition to thoughts of self-injury; contracts for safety, see below for risk assessment. At conclusion of evaluation, patient is amenable to the recommendations of this writer including: medication management and psychothreapy.  Also, patient is amenable to calling/contacting the outpatient office including this writer if any acute adverse effects of their medication regimen arise in addition  to any comments or concerns pertaining to their psychiatric management.  Patient is amenable to calling/contacting crisis and/or attending to the nearest emergency department if their clinical condition deteriorates to assure their safety and stability, stating that they are able to appropriately confide in their psychiatrist and psychotherapist regarding their psychiatric state.     At this juncture, inpatient hospitalization is not currently warranted. The following interventions are recommended:   no intervention changes    To mitigate future risk, patient should adhere to the recommendations of this writer, avoid alcohol/illicit substance use, utilize community-based resources and familiar support and prioritize mental health treatment.     Psychotherapy Provided:     Individual psychotherapy provided: Yes     Treatment Plan:    Completed and signed during the session: Not applicable - Treatment Plan not due at this session    Note Share:    This note was not shared with the patient due to reasonable likelihood of causing patient harm    Visit Time    Visit Start Time: 08:30 AM  Visit Stop Time: 09:00 AM  Total Visit Duration: 30 minutes    Alex Espino MD 05/20/25         [1]   Allergies  Allergen Reactions    Effexor [Venlafaxine] Hives     Patient developed hives when taking this medication. Improved with stopping medication and returned when patient retried medication on her own.    [2]   Current Outpatient Medications   Medication Sig Dispense Refill    aspirin (ECOTRIN LOW STRENGTH) 81 mg EC tablet Take 81 mg by mouth      celecoxib (CeleBREX) 100 mg capsule Take 1 capsule (100 mg total) by mouth 2 (two) times a day 60 capsule 2    citalopram (CeleXA) 40 mg tablet Take 1 tablet (40 mg total) by mouth daily 30 tablet 2    DULoxetine (CYMBALTA) 20 mg capsule Take 1 capsule (20 mg total) by mouth 2 (two) times a day 60 capsule 2    ergocalciferol (VITAMIN D2) 50,000 units Take 1 capsule  (50,000 Units total) by mouth once a week 12 capsule 5    Esketamine HCl, 56 MG Dose, (SPRAVATO) 28 MG/DEVICE nasal spray 2 sprays by Intranasal route 2 (two) times a week for 15 days (Patient not taking: Reported on 5/14/2025)      hydroCHLOROthiazide 25 mg tablet Take one po three times/week 30 tablet 0    levothyroxine 75 mcg tablet Take 1 tablet (75 mcg total) by mouth daily 90 tablet 3    Multiple Vitamins-Minerals (MULTIVITAMIN ADULTS 50+) TABS Take by mouth      rosuvastatin (CRESTOR) 10 MG tablet Take 1 tablet (10 mg total) by mouth daily 90 tablet 3     No current facility-administered medications for this visit.

## 2025-05-22 ENCOUNTER — APPOINTMENT (OUTPATIENT)
Dept: PHYSICAL THERAPY | Facility: HOME HEALTHCARE | Age: 65
End: 2025-05-22
Attending: INTERNAL MEDICINE
Payer: COMMERCIAL

## 2025-05-27 ENCOUNTER — OFFICE VISIT (OUTPATIENT)
Dept: OBGYN CLINIC | Facility: CLINIC | Age: 65
End: 2025-05-27
Payer: COMMERCIAL

## 2025-05-27 VITALS
OXYGEN SATURATION: 96 % | HEART RATE: 81 BPM | WEIGHT: 293 LBS | HEIGHT: 65 IN | TEMPERATURE: 97.6 F | BODY MASS INDEX: 48.82 KG/M2

## 2025-05-27 DIAGNOSIS — M17.0 PRIMARY OSTEOARTHRITIS OF BOTH KNEES: Primary | ICD-10-CM

## 2025-05-27 PROCEDURE — 99213 OFFICE O/P EST LOW 20 MIN: CPT | Performed by: STUDENT IN AN ORGANIZED HEALTH CARE EDUCATION/TRAINING PROGRAM

## 2025-05-27 PROCEDURE — 20610 DRAIN/INJ JOINT/BURSA W/O US: CPT | Performed by: STUDENT IN AN ORGANIZED HEALTH CARE EDUCATION/TRAINING PROGRAM

## 2025-05-27 RX ORDER — LIDOCAINE HYDROCHLORIDE 10 MG/ML
2 INJECTION, SOLUTION INFILTRATION; PERINEURAL
Status: COMPLETED | OUTPATIENT
Start: 2025-05-27 | End: 2025-05-27

## 2025-05-27 RX ORDER — TRIAMCINOLONE ACETONIDE 40 MG/ML
80 INJECTION, SUSPENSION INTRA-ARTICULAR; INTRAMUSCULAR
Status: COMPLETED | OUTPATIENT
Start: 2025-05-27 | End: 2025-05-27

## 2025-05-27 RX ORDER — BUPIVACAINE HYDROCHLORIDE 2.5 MG/ML
2 INJECTION, SOLUTION INFILTRATION; PERINEURAL
Status: COMPLETED | OUTPATIENT
Start: 2025-05-27 | End: 2025-05-27

## 2025-05-27 RX ADMIN — TRIAMCINOLONE ACETONIDE 80 MG: 40 INJECTION, SUSPENSION INTRA-ARTICULAR; INTRAMUSCULAR at 10:45

## 2025-05-27 RX ADMIN — LIDOCAINE HYDROCHLORIDE 2 ML: 10 INJECTION, SOLUTION INFILTRATION; PERINEURAL at 10:45

## 2025-05-27 RX ADMIN — BUPIVACAINE HYDROCHLORIDE 2 ML: 2.5 INJECTION, SOLUTION INFILTRATION; PERINEURAL at 10:45

## 2025-05-27 NOTE — PROGRESS NOTES
Orthopedic Surgery Office Note  Kimberly Montanez (65 y.o. female)   : 1960   MRN: [id number]   Encounter Date: 2025 with Dr. Lloyd Cobos, DO  Encounter department: [facility] [place]  Chief Complaint   Patient presents with    Left Knee - Follow-up, Pain    Right Knee - Follow-up, Pain       Assessment, Plan, & Discussion:     :  Assessment & Plan  Primary osteoarthritis of both knees    Continue HEP/PT  Continue medication prn   Encouraged continued weight loss and looking for improvements with current progress   Zilretta for next appointment   Provided CSI today, tolerated well    Orders:    Injection Procedure Prior Authorization; Future      Return in about 3 months (around 2025).    Surgery:   No surgery planned at this time    History of Present Illness:     Today, she reports continued progress and about 1.5 months of relief with prior injection. She would like to proceed with CSI today, and had no concern with previous injection.     Prior HPI  Kimberly Montanez is a 65 y.o. female who presents for consultation at the request of No ref. provider found regarding bilateral knee pain. Patient states that she has had bilateral knee pain for several years however she was able to ambulate and it did not interfere with her ability to complete activities of daily living.  However as of 2025 she has had severe pain in both of her knees that has limited her ability to ambulate, move from a sitting to a standing position, and standing for long periods of time.  Patient was seen in the ED on 2025 for severe left knee pain.  She states that she went to stand from a sitting position and felt as if she could not flex or extend her left knee.  She has been taking Celebrex which she states has not provided any relief of symptoms.  She admits to trying Tylenol, ibuprofen, and Advil previously with no relief of symptoms.  Patient denies any trauma, fall, or injury to  "bilateral knees.    Review of Systems  Constitutional: Negative for fatigue, fever or loss of appetite.   HENT: Negative.    Respiratory: Negative for shortness of breath, dyspnea.    Cardiovascular: Negative for chest pain/tightness.   Gastrointestinal: Negative for abdominal pain, N/V.   Endocrine: Negative for cold/heat intolerance, unexplained weight loss/gain.   Genitourinary: Negative for flank pain, dysuria  Skin: Negative for rash.    Psychiatric/Behavioral: Negative for agitation.  All else negative unless otherwise noted in HPI    Physical Exam:   General:  Pulse 81   Temp 97.6 °F (36.4 °C) (Temporal)   Ht 5' 5\" (1.651 m)   Wt (!) 144 kg (316 lb 6.4 oz)   SpO2 96%   BMI 52.65 kg/m²   Cons: Appears well.  No apparent distress.  Psych: Alert. Oriented x3.  Mood and affect normal.  Eyes: PERRLA, EOMI  Resp: Normal effort.  No audible wheezing or stridor.  CV: Extremities warm and well perfused.   Abd:    No distention or guarding.   Skin: Warm. No visible lesions.  Neuro: Normal muscle tone.      Orthopedic Exam:   bilateral knee(s) -   Patient ambulates with antalgic gait pattern  Uses Walker assistive device  Wind-swept anatomical deformity  Skin is warm and dry to touch with no signs of erythema, ecchymosis, or infection  Mild generalized soft tissue swelling noted  ROM (0° - 90°)   MMT: 4/5 throughout  TTP over medial joint line, TTP over lateral joint line, TTP over anterior knee  Flexor and extensor mechanisms are intact   Knee is stable to varus and valgus stress  Patella tracks centrally without palpable crepitus  Calf compartments are soft and supple  Intact  pulses with brisk capillary refill to the toes  Sensation light touch intact distally      Imaging/Studies:     Study: XR left knee  Date: 11/12/24  Report: I have read and agree with the radiologist report. and I have personally reviewed the imaging in PACS and my impression is as follows:  IMPRESSION:  Severe medial compartment " "degenerative changes as above.  Secondary varus angulation.  Moderate effusion.  Patellar enthesopathy. No fracture.    Study: XR right knee  Date: 11/12/24  Report: I have read and agree with the radiologist report. and I have personally reviewed the imaging in PACS and my impression is as follows:  IMPRESSION:  Severe medial compartment degenerative changes as above with secondary varus angulation.  Small effusion.  Patellar enthesopathy.  No fracture       Large joint arthrocentesis: R knee    Performed by: Lloyd Cobos DO  Authorized by: Lloyd Cobos DO    Universal Protocol:  procedure performed by consultantConsent: Verbal consent obtained  Risks and benefits: risks, benefits and alternatives were discussed  Consent given by: patient  Time out: Immediately prior to procedure a \"time out\" was called to verify the correct patient, procedure, equipment, support staff and site/side marked as required.  Patient understanding: patient states understanding of the procedure being performed  Site marked: the operative site was marked  Radiology Images displayed and confirmed. If images not available, report reviewed: imaging studies available  Patient identity confirmed: verbally with patient  Supporting Documentation  Indications: pain and joint swelling     Is this a Visco injection? NoProcedure Details  Location: knee - R knee  Needle gauge: 21 G.  Ultrasound guidance: no  Approach: anteromedial  Medications administered: 80 mg triamcinolone acetonide 40 mg/mL; 2 mL bupivacaine 0.25 %; 2 mL lidocaine 1 %    Patient tolerance: patient tolerated the procedure well with no immediate complications  Dressing:  Sterile dressing applied      Large joint arthrocentesis: L knee    Performed by: Lloyd Cobos DO  Authorized by: Lloyd Cobos DO    Universal Protocol:  procedure performed by consultantConsent: Verbal consent obtained  Risks and benefits: risks, benefits and alternatives were discussed  Consent " "given by: patient  Time out: Immediately prior to procedure a \"time out\" was called to verify the correct patient, procedure, equipment, support staff and site/side marked as required.  Patient understanding: patient states understanding of the procedure being performed  Site marked: the operative site was marked  Radiology Images displayed and confirmed. If images not available, report reviewed: imaging studies available  Patient identity confirmed: verbally with patient  Supporting Documentation  Indications: pain and joint swelling     Is this a Visco injection? NoProcedure Details  Location: knee - L knee  Needle gauge: 21 G.  Ultrasound guidance: no  Approach: anteromedial  Medications administered: 80 mg triamcinolone acetonide 40 mg/mL; 2 mL bupivacaine 0.25 %; 2 mL lidocaine 1 %    Patient tolerance: patient tolerated the procedure well with no immediate complications  Dressing:  Sterile dressing applied               Medical, Surgical, Family, and Social History    The patient's medical history, family history, and social history, were reviewed and updated as appropriate.    Past Medical History:   Diagnosis Date    Anxiety     Depression 2013    Disease of thyroid gland     Heart murmur     Obesity 1979    Osteoarthritis 2024    S/p partial hysterectomy with remaining cervical stump 05/03/2018     Past Surgical History:   Procedure Laterality Date    TOTAL ABDOMINAL HYSTERECTOMY      TUBAL LIGATION      WISDOM TOOTH EXTRACTION       Family History   Problem Relation Name Age of Onset    Arthritis Mother Kerline Alycia     Osteoporosis Mother Kerline Tong     Hypertension Father Seven Alycia     Dementia Father Seven Tong     Arthritis Father Seven Alycia     Osteoporosis Father Seven Alycia     Depression Daughter Katrin Che     Depression Daughter Viviane Lisseth     ADD / ADHD Son Chino Canaleser     Depression Son Chino Canaleser     No Known Problems Paternal Aunt      Breast cancer Cousin  "      Social History     Occupational History    Not on file   Tobacco Use    Smoking status: Never    Smokeless tobacco: Never   Vaping Use    Vaping status: Never Used   Substance and Sexual Activity    Alcohol use: No    Drug use: No    Sexual activity: Not Currently     Partners: Male     Birth control/protection: Female Sterilization     Allergies   Allergen Reactions    Effexor [Venlafaxine] Hives     Patient developed hives when taking this medication. Improved with stopping medication and returned when patient retried medication on her own.        Current Outpatient Medications:     aspirin (ECOTRIN LOW STRENGTH) 81 mg EC tablet, Take 81 mg by mouth, Disp: , Rfl:     celecoxib (CeleBREX) 100 mg capsule, Take 1 capsule (100 mg total) by mouth 2 (two) times a day, Disp: 60 capsule, Rfl: 2    citalopram (CeleXA) 40 mg tablet, Take 1 tablet (40 mg total) by mouth daily, Disp: 30 tablet, Rfl: 2    DULoxetine (CYMBALTA) 20 mg capsule, Take 1 capsule (20 mg total) by mouth 2 (two) times a day, Disp: 60 capsule, Rfl: 2    ergocalciferol (VITAMIN D2) 50,000 units, Take 1 capsule (50,000 Units total) by mouth once a week, Disp: 12 capsule, Rfl: 5    hydroCHLOROthiazide 25 mg tablet, Take one po three times/week, Disp: 30 tablet, Rfl: 0    levothyroxine 75 mcg tablet, Take 1 tablet (75 mcg total) by mouth daily, Disp: 90 tablet, Rfl: 3    Multiple Vitamins-Minerals (MULTIVITAMIN ADULTS 50+) TABS, Take by mouth, Disp: , Rfl:     rosuvastatin (CRESTOR) 10 MG tablet, Take 1 tablet (10 mg total) by mouth daily, Disp: 90 tablet, Rfl: 3    Esketamine HCl, 56 MG Dose, (SPRAVATO) 28 MG/DEVICE nasal spray, 2 sprays by Intranasal route 2 (two) times a week for 15 days (Patient not taking: Reported on 5/14/2025), Disp: , Rfl:       This Visit:     Robert ZEPEDA PA-C, scribed this note in conjunction with and in the presence of Dr. Lloyd Cobos DO, who performed parts of the services as described in this documentation      Lloyd Cobos DO, Orthopedic Surgeon  Orthopedic Oncology & Sarcoma Surgery

## 2025-05-29 ENCOUNTER — OFFICE VISIT (OUTPATIENT)
Dept: PHYSICAL THERAPY | Facility: HOME HEALTHCARE | Age: 65
End: 2025-05-29
Attending: INTERNAL MEDICINE
Payer: COMMERCIAL

## 2025-05-29 DIAGNOSIS — M17.0 PRIMARY OSTEOARTHRITIS OF BOTH KNEES: ICD-10-CM

## 2025-05-29 DIAGNOSIS — M25.511 BILATERAL SHOULDER PAIN, UNSPECIFIED CHRONICITY: Primary | ICD-10-CM

## 2025-05-29 DIAGNOSIS — M25.512 BILATERAL SHOULDER PAIN, UNSPECIFIED CHRONICITY: Primary | ICD-10-CM

## 2025-05-29 PROCEDURE — 97110 THERAPEUTIC EXERCISES: CPT | Performed by: PHYSICAL THERAPIST

## 2025-05-29 NOTE — PROGRESS NOTES
"Daily Note     Today's date: 2025  Patient name: Kimberly Montanez  : 1960  MRN: [id number]  Referring provider: Batsheva Gomes DO  Dx:   Encounter Diagnosis     ICD-10-CM    1. Bilateral shoulder pain, unspecified chronicity  M25.511     M25.512       2. Primary osteoarthritis of both knees  M17.0                      Subjective: Pt reporting that she got the shots in her knees on Tuesday so the knees feel pretty good today.       Objective: See treatment diary below      Assessment: Tolerable to all exercises with less pain than usually experiencing during session.  Resistance increased on Nustep but kept the same for remainder of program as pt remains challenged with current program. Will continue to work on gradual progression as able.     Plan: Continue per plan of care.        Visit Count 3 4 5 6 2      Manuals 5/12 5/15 5/19 5/29 5/8                   Neuro Re-Ed                         Ther Ex        NuStep for ROM and conditioning  L1 10 min  L2 10 minutes  L2   10' L2 10'  L1   8'   HR/TR X 20 X 20  1x20ea X 20 ea  1x20 ea    Standing hip flex  0#   X 20 ea  0#   X 20 ea  0# 1x20 luis angel 0# x 20  0# 1x20 Luis Angel   Standing hip abd  1#   X 20 ea  1#   X 20 ea  1# 2x10 luis angel 1# x 20 luis angel  1# 1x20 Luis Angel    Seated hip add 5\" x 20  5\" x 20  5\" 2 x10 5\" x 20  5\" 2x10    Seated LAQ 5\"  x 20 5\" x 20  1# 5\" 2x10 luis angel 1# x 20  1# 5\" 2x10 Luis Angel    Heel slides    1x20 Luis Angel   1x20 Luis Angel   QS  5\" x 20  5\" x 20  5\" 2x10 Luis Angel 5\" x 20  5\" 2x10 Luis Angel   SAQ  2 x 10  X 20  1# 5\" 2x10 Luis Angel 0# x 20 luis angel  1# 5\" 2x10 Luis Angel   Supine SLR X 10 luis angel  X 10  1x10 Luis Angel   X 10 luis angel  1x10 L  1x6 R   Shld shrugs  X 20  X 20  1x20 X 20  1x20    Retractions  X 20  X 20  3\" 1x20 X 20  3\"x20    Pulleys: flex/scap  Flex only   X 15  Flex/Scap   X 10 ea  Flex/scap  X 10 ea  Flex/scap   X 10  Flex 5\" 1x20 Luis Angel  Scap 5\" 1x 20 Luis Angel    Elbow curls  1# x 20  0# x 20  1# 1x20 1# x 20  1# 1x20    Cane flex Seated x 20   Seated 1x20 Seated x 20  Seated 2x10    Table " slides   Flex x 10 ea  Flex   20x  Luis Angel  Flex  1x20 ea Luis Angel   Scap hold    Hip abd    Seated yellow   1x20 Seated yellow  X 20  Seated   Yellow   1x20   Marches   Standing   1# x 10 Seated  1# 1x20 Luis Angel  Standing   1# x 20  Standing   1#  x20 Luis Angel                           Ther Activity                        Gait Training                        Modalities

## 2025-06-02 ENCOUNTER — OFFICE VISIT (OUTPATIENT)
Dept: PHYSICAL THERAPY | Facility: HOME HEALTHCARE | Age: 65
End: 2025-06-02
Attending: INTERNAL MEDICINE
Payer: COMMERCIAL

## 2025-06-02 DIAGNOSIS — M25.511 BILATERAL SHOULDER PAIN, UNSPECIFIED CHRONICITY: Primary | ICD-10-CM

## 2025-06-02 DIAGNOSIS — M25.512 BILATERAL SHOULDER PAIN, UNSPECIFIED CHRONICITY: Primary | ICD-10-CM

## 2025-06-02 DIAGNOSIS — M17.0 PRIMARY OSTEOARTHRITIS OF BOTH KNEES: ICD-10-CM

## 2025-06-02 PROCEDURE — 97110 THERAPEUTIC EXERCISES: CPT

## 2025-06-02 NOTE — PROGRESS NOTES
"Daily Note     Today's date: 2025  Patient name: Kimberly Montanez  : 1960  MRN: [id number]  Referring provider: Batsheva Gomes DO  Dx:   Encounter Diagnosis     ICD-10-CM    1. Bilateral shoulder pain, unspecified chronicity  M25.511     M25.512       2. Primary osteoarthritis of both knees  M17.0                      Subjective: Pt reports her knees are \"grinding\" and she has pain in her shoulders.        Objective: See treatment diary below      Assessment: Tolerated treatment fair. Pt remains challenged with current program. Pt reports pain R shoulder > L shoulder during pulleys. Patient would benefit from continued PT      Plan: Continue per plan of care.        Visit Count 3 4 5 6 7      Manuals 5/12 5/15 5/19 5/29 6/2                   Neuro Re-Ed                         Ther Ex        NuStep for ROM and conditioning  L1 10 min  L2 10 minutes  L2   10' L2 10'  L2   10'    HR/TR X 20 X 20  1x20ea X 20 ea  1x20 ea    Standing hip flex  0#   X 20 ea  0#   X 20 ea  0# 1x20 luis angel 0# x 20  0# 1x20 Luis Angel   Standing hip abd  1#   X 20 ea  1#   X 20 ea  1# 2x10 luis angel 1# x 20 luis angel  1# 1x20 Luis Angel    Seated hip add 5\" x 20  5\" x 20  5\" 2 x10 5\" x 20  5\" 2x10    Seated LAQ 5\"  x 20 5\" x 20  1# 5\" 2x10 luis angel 1# x 20  1# 5\" 2x10 Luis Angel    Heel slides    1x20 Luis Angel      QS  5\" x 20  5\" x 20  5\" 2x10 Luis Angel 5\" x 20  5\" 2x10 Luis Angel   SAQ  2 x 10  X 20  1# 5\" 2x10 Luis Angel 0# x 20 luis angel  0# 5\" 2x10 Luis Angel   Supine SLR X 10 luis angel  X 10  1x10 Luis Angel   X 10 luis angel  1x10 Luis Angel   Shld shrugs  X 20  X 20  1x20 X 20  1x20    Retractions  X 20  X 20  3\" 1x20 X 20  3\"x20    Pulleys: flex/scap  Flex only   X 15  Flex/Scap   X 10 ea  Flex/scap  X 10 ea  Flex/scap   X 10  Flex/scap  1x10   Elbow curls  1# x 20  0# x 20  1# 1x20 1# x 20  1# 1x20    Cane flex Seated x 20   Seated 1x20 Seated x 20  Seated 2x10    Table slides   Flex x 10 ea  Flex   20x  Luis Angel  Flex/  scap  1x20 L  1x15 R    Hip abd    Seated yellow   1x20 Seated yellow  X 20  Seated   Yellow   1x20 "   Hector   Standing   1# x 10 Seated  1# 1x20 Luis Angel  Standing   1# x 20  Seated   1#  x20 Luis Angel                           Ther Activity                        Gait Training                        Modalities

## 2025-06-05 ENCOUNTER — OFFICE VISIT (OUTPATIENT)
Dept: PHYSICAL THERAPY | Facility: HOME HEALTHCARE | Age: 65
End: 2025-06-05
Attending: INTERNAL MEDICINE
Payer: COMMERCIAL

## 2025-06-05 DIAGNOSIS — M25.512 BILATERAL SHOULDER PAIN, UNSPECIFIED CHRONICITY: Primary | ICD-10-CM

## 2025-06-05 DIAGNOSIS — M25.511 BILATERAL SHOULDER PAIN, UNSPECIFIED CHRONICITY: Primary | ICD-10-CM

## 2025-06-05 PROCEDURE — 97110 THERAPEUTIC EXERCISES: CPT

## 2025-06-05 NOTE — PROGRESS NOTES
"Daily Note     Today's date: 2025  Patient name: Kimberly Montanez  : 1960  MRN: [id number]  Referring provider: Batsheva Gomes DO  Dx: No diagnosis found.               Subjective: Grinding in both knees. L>R today with pain of 6/10. B sh pain of 3/10.     Objective: See treatment diary below    Assessment:  Pt gustabo session fairly well. Pt was able to advance with increased wt with a few LE ex with encouragement provided. Pt with pain reported at B LE and B sh t/o ex but pt was able to complete.  Instructed pt in use of CP for pain relief. Patient would benefit from continued PT    Plan: Continue per plan of care.        Visit Count 3 4 5 6 7      Manuals 6-5 5/15 5/19 5/29 6/2                   Neuro Re-Ed                         Ther Ex 6-5       NuStep for ROM and conditioning  L2 10 min  L2 10 minutes  L2   10' L2 10'  L2   10'    HR/TR X 20 X 20  1x20ea X 20 ea  1x20 ea    Standing hip flex  0#   X 20 ea  0#   X 20 ea  0# 1x20 prem 0# x 20  0# 1x20 Prem   Standing hip abd  1.5#   X 15 ea  1#   X 20 ea  1# 2x10 prem 1# x 20 prem  1# 1x20 Prem    Seated hip add 5\" x 20  5\" x 20  5\" 2 x10 5\" x 20  5\" 2x10    Seated LAQ 1.5#  5\"  1x 15 5\" x 20  1# 5\" 2x10 prem 1# x 20  1# 5\" 2x10 Prem    Heel slides  1x  1x20 Prem      QS  5\" x 20 prem 5\" x 20  5\" 2x10 Prem 5\" x 20  5\" 2x10 Prem   SAQ  2 x 10 prem X 20  1# 5\" 2x10 Prem 0# x 20 prem  0# 5\" 2x10 Prem   Supine SLR X 10 prem  X 10  1x10 Prem   X 10 prem  1x10 Prem   Shld shrugs  X 20  X 20  1x20 X 20  1x20    Retractions  X 20  X 20  3\" 1x20 X 20  3\"x20    Pulleys: flex/scap  Flex/scap   X 20 prem  Flex/Scap   X 10 ea  Flex/scap  X 10 ea  Flex/scap   X 10  Flex/scap  1x10   Elbow curls  1# x 20  0# x 20  1# 1x20 1# x 20  1# 1x20    Cane flex Seated x 20   Seated 1x20 Seated x 20  Seated 2x10    Table slides  Flex/scap  X20 ea prem Flex x 10 ea  Flex   20x  Prem  Flex/  scap  1x20 L  1x15 R    Hip abd  Yellow  1x20  Seated yellow   1x20 Seated yellow  X 20  Seated   Yellow "   1x20   Marches  Standing 1.5#   X15 luis angel Standing   1# x 10 Seated  1# 1x20 Luis Angel  Standing   1# x 20  Seated   1#  x20 Luis Angel                           Ther Activity                        Gait Training                        Modalities

## 2025-06-09 ENCOUNTER — TELEPHONE (OUTPATIENT)
Age: 65
End: 2025-06-09

## 2025-06-09 ENCOUNTER — OFFICE VISIT (OUTPATIENT)
Dept: PHYSICAL THERAPY | Facility: HOME HEALTHCARE | Age: 65
End: 2025-06-09
Attending: INTERNAL MEDICINE
Payer: COMMERCIAL

## 2025-06-09 DIAGNOSIS — M25.512 BILATERAL SHOULDER PAIN, UNSPECIFIED CHRONICITY: Primary | ICD-10-CM

## 2025-06-09 DIAGNOSIS — M25.511 BILATERAL SHOULDER PAIN, UNSPECIFIED CHRONICITY: Primary | ICD-10-CM

## 2025-06-09 DIAGNOSIS — M17.0 PRIMARY OSTEOARTHRITIS OF BOTH KNEES: ICD-10-CM

## 2025-06-09 PROCEDURE — 97110 THERAPEUTIC EXERCISES: CPT | Performed by: PHYSICAL THERAPIST

## 2025-06-09 NOTE — PROGRESS NOTES
"Daily Note     Today's date: 2025  Patient name: Kimberly Montanez  : 1960  MRN: [id number]  Referring provider: Batsheva Gomes DO  Dx:   Encounter Diagnosis     ICD-10-CM    1. Bilateral shoulder pain, unspecified chronicity  M25.511     M25.512       2. Primary osteoarthritis of both knees  M17.0                      Subjective: Pt with no new complaints today.       Objective: See treatment diary below      Assessment: Pt tolerable to increased resistance with elbow curls and standing hip flexion but with evident fatigue and limited reps.  Pt to continue progression of program to address wide spread strength and mobility deficits, while working gradually to avoid further irritation/pain.     Plan: Continue per plan of care.        Visit Count 8 9 5 6 7      Manuals 6-5                    Neuro Re-Ed                         Ther Ex 6-5       NuStep for ROM and conditioning  L2 10 min  L3 10 minutes  L2   10' L2 10'  L2   10'    HR/TR X 20 X 20  1x20ea X 20 ea  1x20 ea    Standing hip flex  0#   X 20 ea  1#   X 15 ea  0# 1x20 luis angel 0# x 20  0# 1x20 Luis Angel   Standing hip abd  1.5#   X 15 ea  1# x 15 ea  1# 2x10 luis angel 1# x 20 luis angel  1# 1x20 Luis Angel    Seated hip add 5\" x 20   5\" 2 x10 5\" x 20  5\" 2x10    Seated LAQ 1.5#  5\"  1x 15 1# 5\" x 15  1# 5\" 2x10 luis angel 1# x 20  1# 5\" 2x10 Luis Angel    Heel slides  1x  1x20 Luis Angel      QS  5\" x 20 luis angel 5\" x 20  5\" 2x10 Luis Angel 5\" x 20  5\" 2x10 Luis Angel   SAQ  2 x 10 luis angel 5\" x 20  1# 5\" 2x10 Luis Angel 0# x 20 luis angel  0# 5\" 2x10 Luis Angel   Supine SLR X 10 luis angel  X 10 1x10 Luis Angel   X 10 luis angel  1x10 Luis Angel   Shld shrugs  X 20  X 20  1x20 X 20  1x20    Retractions  X 20  X 20  3\" 1x20 X 20  3\"x20    Pulleys: flex/scap  Flex/scap   X 20 luis angel  Flex/Scap   X 10 luis angel  Flex/scap  X 10 ea  Flex/scap   X 10  Flex/scap  1x10   Elbow curls  1# x 20  2# x 10 1# 1x20 1# x 20  1# 1x20    Cane flex Seated x 20  Seated x 20  Seated 1x20 Seated x 20  Seated 2x10    Table slides  Flex/scap  X20 ea luis angel  Flex   20x  Luis Angel  " Flex/  scap  1x20 L  1x15 R    Hip abd  Yellow  1x20 Yellow   X 20  Seated yellow   1x20 Seated yellow  X 20  Seated   Yellow   1x20   Marches  Standing 1.5#   X15 luis angel  Seated  1# 1x20 Luis Angel  Standing   1# x 20  Seated   1#  x20 Luis Angel                           Ther Activity                        Gait Training                        Modalities

## 2025-06-10 DIAGNOSIS — E55.9 VITAMIN D DEFICIENCY: ICD-10-CM

## 2025-06-10 RX ORDER — ERGOCALCIFEROL 1.25 MG/1
50000 CAPSULE, LIQUID FILLED ORAL WEEKLY
Qty: 12 CAPSULE | Refills: 5 | Status: SHIPPED | OUTPATIENT
Start: 2025-06-10

## 2025-06-11 DIAGNOSIS — F33.2 SEVERE EPISODE OF RECURRENT MAJOR DEPRESSIVE DISORDER, WITHOUT PSYCHOTIC FEATURES (HCC): ICD-10-CM

## 2025-06-11 DIAGNOSIS — F41.1 GAD (GENERALIZED ANXIETY DISORDER): ICD-10-CM

## 2025-06-12 ENCOUNTER — OFFICE VISIT (OUTPATIENT)
Dept: PHYSICAL THERAPY | Facility: HOME HEALTHCARE | Age: 65
End: 2025-06-12
Attending: INTERNAL MEDICINE
Payer: COMMERCIAL

## 2025-06-12 DIAGNOSIS — M25.512 BILATERAL SHOULDER PAIN, UNSPECIFIED CHRONICITY: Primary | ICD-10-CM

## 2025-06-12 DIAGNOSIS — M25.511 BILATERAL SHOULDER PAIN, UNSPECIFIED CHRONICITY: Primary | ICD-10-CM

## 2025-06-12 DIAGNOSIS — M17.0 PRIMARY OSTEOARTHRITIS OF BOTH KNEES: ICD-10-CM

## 2025-06-12 PROCEDURE — 97110 THERAPEUTIC EXERCISES: CPT | Performed by: PHYSICAL THERAPIST

## 2025-06-12 RX ORDER — DULOXETIN HYDROCHLORIDE 20 MG/1
20 CAPSULE, DELAYED RELEASE ORAL 2 TIMES DAILY
Qty: 180 CAPSULE | Refills: 2 | Status: SHIPPED | OUTPATIENT
Start: 2025-06-12

## 2025-06-12 NOTE — PROGRESS NOTES
"PT Re-Evaluation     Today's date: 2025  Patient name: Kimberly Montanez  : 1960  MRN: [id number]  Referring provider: Batsheva Gomes DO  Dx:   Encounter Diagnosis     ICD-10-CM    1. Bilateral shoulder pain, unspecified chronicity  M25.511     M25.512       2. Primary osteoarthritis of both knees  M17.0                      Assessment  Impairments: abnormal or restricted ROM, abnormal movement, activity intolerance, impaired physical strength, lacks appropriate home exercise program, pain with function, scapular dyskinesis, weight-bearing intolerance, poor posture , unable to perform ADL, activity limitations and endurance    Assessment details: UPDATE:  Pt continues to demonstrate slow but steady progression in ROM of B shoulders and B knees.  She continues with strength deficits, and improvements in mobility but states seeing some improvement with tolerance to her daily activities. PT to continue with POC and work on further progression so that pt can show greater improvement with daily activities. Thank you.     Pt Kimberly Montanez is a 64 y.o. who presents to OPPT with s/s consistent with B shoulder and B knee pain due to OA. Pt presents with significant mobility deficits in B shoulders/B knee, decreased LE/UE strength, postural dysfunction, impaired soft tissue mobility, and pain with functional activities. Pt reporting tolerance < 10 minutes to standing/walking, modifications to washing, dressing, cooking, cleaning, and disrupted sleep due to deficits. Pt would benefit from skilled therapy services to address outlined impairments, work towards goals, and restore pts PLOF. Thank you!   Understanding of Dx/Px/POC: good     Prognosis: good    Goals  STGs to be achieved in 4 weeks:  -Pt to demonstrate reduced subjective pain rating \"at worst\" by at least 2-3 points from Initial Eval to allow for reduced pain with ADLs and improved functional activity tolerance. - Ongoing   -Pt to demonstrate B knee " ROM improved > 20* flexion in order to maximize joint mobility and function and allow for progression of exercise program and achievement of goals. - MET   -Pt to demonstrate B shoulder ROM improved > 20* flexion/abduction in order to maximize joint mobility and function and allow for progression of exercise program and achievement of goals. - progressing but not met   -Pt to demonstrate increased MMT of B UE/LE  by at least 1/2 grade in order to improve safety and stability with ADLs and functional mobility. - progressing but not met     LTGs to be achieved upon discharge:   -Pt will be I with HEP in order to continue to improve quality of life and independence and reduce risk for re-injury. - Met   -Pt to demonstrate return to activities of daily living without limiations or restrictions. - progressing but not met   -Pt will return to lifting/carrying > 5# to help facilitate return to community activities independently - not met   -Pt to demonstrate return to ambulation with AD > 20 minutes to return to community based activities without limitations. - progressing but not fully met   -Pt to demonstrate improved function as noted by achieving or exceeding predicted score on FOTO outcomes assessment tool. - not met       Plan  Patient would benefit from: skilled physical therapy  Planned modality interventions: thermotherapy: hydrocollator packs    Planned therapy interventions: manual therapy, neuromuscular re-education, patient education, postural training, therapeutic exercise, therapeutic activities, home exercise program, flexibility and functional ROM exercises    Frequency: 2x week  Duration in weeks: 12  Plan of Care beginning date: 5/5/2025  Plan of Care expiration date: 7/28/2025  Treatment plan discussed with: patient and referring physician        Subjective Evaluation    History of Present Illness  Mechanism of injury: UPDATE:  Pt reporting that she is seeing improvement with therapy and would like to  continue. She will return to see MD again in August.     Pt reporting that she has been having knee and shoulder pain since the fall.  She had x-rays taken which were (+) for severe joint OA B knees and mild to moderate OA B shoulders. Pt ended up in ED on 25 due to severe pain and immobility.  PCP follow up with referral to OPPT for conservative management of s/s.     Quality of life: good    Patient Goals  Patient goals for therapy: decreased pain, improved balance, increased motion, increased strength and independence with ADLs/IADLs    Pain  At best pain ratin  At worst pain ratin  Quality: dull ache, grinding, tight and throbbing  Relieving factors: medications  Aggravating factors: standing, walking, stair climbing, lifting and overhead activity  Progression: no change    Social Support  Steps to enter house: yes      Diagnostic Tests  X-ray: abnormal  Treatments  Current treatment: physical therapy        Objective     Active Range of Motion   Left Shoulder   Flexion: 120 degrees   Abduction: 75 degrees   External rotation 0°: Left shoulder active external rotation at 0 degrees: 50%   Internal rotation 0°: WFL    Right Shoulder   Flexion: 112 degrees   Abduction: 82 degrees   External rotation 0°: Right shoulder active external rotation at 0 degrees: 50%   Internal rotation 0°: WFL  Left Knee   Flexion: 90 degrees   Extension: 0 degrees     Right Knee   Flexion: 95 degrees   Extension: 0 degrees     Strength/Myotome Testing     Left Shoulder     Planes of Motion   Flexion: 3-   Abduction: 3-   External rotation at 0°: 3   Internal rotation at 0°: 3+     Right Shoulder     Planes of Motion   Flexion: 3-   Abduction: 3-   External rotation at 0°: 3   Internal rotation at 0°: 3+     Left Knee   Flexion: 3+  Extension: 3    Right Knee   Flexion: 3+  Extension: 3    Ambulation   Weight-Bearing Status   Assistive device used: wheeled walker    Additional Weight-Bearing Status Details  Tolerance x 10  "minutes with AD     Ambulation: Stairs   Pattern: non-reciprocal  Railings: one rail  Pattern: non-reciprocal  Railings: one rail    Observational Gait   Gait: antalgic   Decreased walking speed and stride length.     Functional Assessment        Comments  Standing tolerance < 10 minutes  Sitting for cooking/dishes   Use of shower chair   Lifting tolerance < 5#   Reaching ability - shoulder height only            Visit Count 8 9 1 - RE 6 7      Manuals 6-5 6/9 6/12 5/29 6/2                   Neuro Re-Ed                         Ther Ex 6-5       NuStep for ROM and conditioning  L2 10 min  L3 10 minutes  L3 10 minutes  L2 10'  L2   10'    HR/TR X 20 X 20  X 15  X 20 ea  1x20 ea    Standing marches    1# x 15      Standing hip flex  0#   X 20 ea  1#   X 15 ea  1# x 15  0# x 20  0# 1x20 Luis Angel   Standing hip abd  1.5#   X 15 ea  1# x 15 ea  1# x 15  1# x 20 luis angel  1# 1x20 Luis Angel    Seated hip add 5\" x 20    5\" x 20  5\" 2x10    Seated LAQ 1.5#  5\"  1x 15 1# 5\" x 15  Walking 3/4 lap around gym with 1#  1# x 20  1# 5\" 2x10 Luis Angel    Heel slides  1x       QS  5\" x 20 luis angel 5\" x 20   5\" x 20  5\" 2x10 Luis Angel   SAQ  2 x 10 luis angel 5\" x 20  5\" x 20  0# x 20 luis angel  0# 5\" 2x10 Luis Angel   Supine SLR X 10 luis angel  X 10  10  X 10 luis angel  1x10 Luis Angel   Shld shrugs  X 20  X 20  X 20  X 20  1x20    Retractions  X 20  X 20  X 20  X 20  3\"x20    Pulleys: flex/scap  Flex/scap   X 20 luis angel  Flex/Scap   X 10 luis angel  Flex/scap  X 10 ea  Flex/scap   X 10  Flex/scap  1x10   Elbow curls  1# x 20  2# x 10  1# x 20  1# 1x20    Cane flex Seated x 20  Seated x 20  Seated x 10  Seated x 20  Seated 2x10    Table slides  Flex/scap  X20 ea luis angel    Flex/  scap  1x20 L  1x15 R    Hip abd  Yellow  1x20 Yellow   X 20   Seated yellow  X 20  Seated   Yellow   1x20                           Ther Activity                        Gait Training                        Modalities                                "

## 2025-06-16 ENCOUNTER — OFFICE VISIT (OUTPATIENT)
Dept: PHYSICAL THERAPY | Facility: HOME HEALTHCARE | Age: 65
End: 2025-06-16
Attending: INTERNAL MEDICINE
Payer: COMMERCIAL

## 2025-06-16 DIAGNOSIS — M25.512 BILATERAL SHOULDER PAIN, UNSPECIFIED CHRONICITY: Primary | ICD-10-CM

## 2025-06-16 DIAGNOSIS — M25.511 BILATERAL SHOULDER PAIN, UNSPECIFIED CHRONICITY: Primary | ICD-10-CM

## 2025-06-16 DIAGNOSIS — M17.0 PRIMARY OSTEOARTHRITIS OF BOTH KNEES: ICD-10-CM

## 2025-06-16 PROCEDURE — 97110 THERAPEUTIC EXERCISES: CPT

## 2025-06-16 NOTE — PROGRESS NOTES
"Daily Note     Today's date: 2025  Patient name: Kimberly Montanez  : 1960  MRN: [id number]  Referring provider: Batsheva Gomes DO  Dx:   Encounter Diagnosis     ICD-10-CM    1. Bilateral shoulder pain, unspecified chronicity  M25.511     M25.512       2. Primary osteoarthritis of both knees  M17.0                  Subjective: Pt reports she has pain in her Luis Angel shoulders and her knees aren't too bad right now.       Objective: See treatment diary below      Assessment:  Progressed to one lap around gym with 1lb weight today. Pt reports pain Luis Angel shoulders t/o exercises and soreness in her knees.  Patient would benefit from continued PT      Plan: Continue per plan of care.        Visit Count 8 9 1 - RE 2 7      Manuals 6-5  64                   Neuro Re-Ed                         Ther Ex 6-5       NuStep for ROM and conditioning  L2 10 min  L3 10 minutes  L3 10 minutes  L3 10'  L2   10'    HR/TR X 20 X 20  X 15  X 20 ea  1x20 ea    Standing marches    1# x 15  1# x 15    Standing hip flex  0#   X 20 ea  1#   X 15 ea  1# x 15  1# x 15 0# 1x20 Luis Angel   Standing hip abd  1.5#   X 15 ea  1# x 15 ea  1# x 15  1# x 15 1# 1x20 Luis Angel    Seated hip add 5\" x 20     5\" 2x10    Seated LAQ 1.5#  5\"  1x 15 1# 5\" x 15  Walking 3/4 lap around gym with 1#  Walking 1 lap around gym with 1#  1# 5\" 2x10 Luis Angel    Heel slides  1x       QS  5\" x 20 luis angel 5\" x 20   5\" x 20  5\" 2x10 Luis Angel   SAQ  2 x 10 luis angel 5\" x 20  5\" x 20  5\" x 20 Luis Angel 0# 5\" 2x10 Luis Angel   Supine SLR X 10 luis angel  X 10  10  X 10 luis angel  1x10 Luis Angel   Shld shrugs  X 20  X 20  X 20  X 20  1x20    Retractions  X 20  X 20  X 20  X 20  3\"x20    Pulleys: flex/scap  Flex/scap   X 20 luis angel  Flex/Scap   X 10 luis angel  Flex/scap  X 10 ea  Flex/scap   X 10 ea Luis Angel Flex/scap  1x10   Elbow curls  1# x 20  2# x 10  2# x 20  1# 1x20    Cane flex Seated x 20  Seated x 20  Seated x 10  Seated x 20  Seated 2x10    Table slides  Flex/scap  X20 ea luis angel    Flex/  scap  1x20 L  1x15 R    Hip abd  " Yellow  1x20 Yellow   X 20    Seated   Yellow   1x20                           Ther Activity                        Gait Training                        Modalities

## 2025-06-17 ENCOUNTER — OFFICE VISIT (OUTPATIENT)
Age: 65
End: 2025-06-17

## 2025-06-17 DIAGNOSIS — F41.1 GAD (GENERALIZED ANXIETY DISORDER): ICD-10-CM

## 2025-06-17 DIAGNOSIS — M25.512 BILATERAL SHOULDER PAIN, UNSPECIFIED CHRONICITY: ICD-10-CM

## 2025-06-17 DIAGNOSIS — F33.2 SEVERE EPISODE OF RECURRENT MAJOR DEPRESSIVE DISORDER, WITHOUT PSYCHOTIC FEATURES (HCC): Primary | ICD-10-CM

## 2025-06-17 DIAGNOSIS — M17.0 PRIMARY OSTEOARTHRITIS OF BOTH KNEES: ICD-10-CM

## 2025-06-17 DIAGNOSIS — M25.511 BILATERAL SHOULDER PAIN, UNSPECIFIED CHRONICITY: ICD-10-CM

## 2025-06-17 PROCEDURE — PBNCHG PB NO CHARGE PLACEHOLDER

## 2025-06-17 RX ORDER — CELECOXIB 100 MG/1
100 CAPSULE ORAL 2 TIMES DAILY
Qty: 180 CAPSULE | Refills: 1 | Status: SHIPPED | OUTPATIENT
Start: 2025-06-17

## 2025-06-17 NOTE — PSYCH
(Indirect Supervision)  MEDICATION MANAGEMENT NOTE        [facility] - PSYCHIATRIC ASSOCIATES      Name and Date of Birth:  Kimberly Montanez 65 y.o. 1960 MRN: [id number]    Insurance: Payor: [facility] [facility] [facility] / Plan: [employer] [facility] / Product Type: [facility] /     Date of Visit: June 17, 2025    Reason for Visit: Medication Management    No medication changes at this time. Patient to get EKG given dual anti-depressant and follow up in one month at resident clinic. Persistent depressive stables are stable.     Assessment & Plan  Severe episode of recurrent major depressive disorder, without psychotic features (HCC)    Celexa 40mg once per day   PARQ completed including serotonin syndrome, SIADH, worsening depression, suicidality, induction of nakul, GI upset, headaches, activation, sexual side effects, sedation, potential drug interactions, and others.   Cymbalta 20mg DR twice per day  PARQ completed including serotonin syndrome, SIADH, worsened depression/suicidality, induction of nakul, blood pressure changes and GI distress, weight gain, sexual side effects, insomnia, sedation, potential for drug interactions, and others.         KRISTIN (generalized anxiety disorder)              TTreatment Recommendations/Precautions:     Psychotropic Medication Regiment: Cymbalta 40mg BID and Celexa 40mg qAM  Psychotherapy: In Psychotherapy  Laboratory: N/A     Next Appointment: 2 week follow up  Aware of 24 hour and weekend coverage for urgent situations accessed by calling [facility] main practice number     Medications Risks/Benefits:       Risks, Benefits And Possible Side Effects Of Medications:     Risks, benefits, and possible side effects of medications explained to Kimberly TERRAZAS and she verbalizes understanding and agreement for treatment.     Controlled Medication Discussion:      Not applicable    SUBJECTIVE:    Kimberly TERRAZAS is seen today  "for a follow up for Major Depressive Disorder.     In the one month interval since her last follow up, Karen endorses persistent dysphoric mood, hopelessness, low energy, concentration, and irritability. She denies suicidal thoughts or behaviors in the interval. She endorses motivation to engage in social activities including seeing her mother, going to her weight loss support group (\"[employer]\"), and talking to her granddaughter Sydnie. However, she endorses persistent loneliness due to dysfunctional relationships with her two daughters, son, and mother. She spends the majority of the evaluation lamenting about dysfunctional traits and behavior in her children including disapproval of the way her daughter has remodeled her childhood home that her mother granted her. We discuss the importance of directly asserting herself in her relationships and listening to the asserted needs of her children towards building better relationships with them. She endorses present dissatisfaction with her present psychotherapist after a scheduling conflict and poor communication between herself and the office, looking to resolve the present conflict with the office at her visit later this morning. She presents today with her service dog. She is agreeable to get an EKG by the next visit and to follow up in 1 month at the [facility]. She is agreeable to continue on medication as prescribed. She expresses gratitude for psychiatric care rendered.     She denies suicidal ideation, intent or plan at present; denies homicidal ideation, intent or plan at present.     She denies auditory hallucinations, denies visual hallucinations, has no overt delusions noted.     She denies any side effects from medications.     HPI ROS Appetite Changes and Sleep:      She reports normal sleep, decreased appetite, (patient attempting to lose weight), improving energy     Current Rating Scores:      None completed today.     Review Of Systems:    "   Constitutional negative   ENT negative   Cardiovascular negative   Respiratory negative   Gastrointestinal negative   Genitourinary negative   Musculoskeletal shoulder pain, knee pain, and arthralgias   Integumentary negative   Neurological negative   Endocrine negative   Other Symptoms none, all other systems are negative         Past Psychiatric History: (unchanged information from previous note copied and updated)     Previous inpatient psychiatric admissions: denies.  Previous inpatient/outpatient substance abuse rehabilitation: denies.  Present/previous outpatient psychiatric treatment: denies.  Present/previous psychotherapy: In therapy.  History of suicidal attempts/gestures: Denies.  History of violence/aggressive behaviors: denies.  Present/previous psychotropic medication use: Celexa, Effexor (caused rash twice), and Wellbutrin     Traumatic History: (unchanged information from previous note copied and updated)     Abuse:emotional and verbal  Other Traumatic Events:  defer     Substance Abuse History:     Patient denies history of alcohol, illict substance, or tobacco abuse.     Kimberly TERRAZAS does not apear under the influence or withdrawal of any psychoactive substance throughout today's examination.      Social History:     Educational History:  Academic history: high school diploma/GED  Marital history:   Social support system: parents, extended family, and friend(s)  Residential history: Resides in home; lives by herself  Vocational History: works as healthcare aid  Access to guns/weapons: none  Legal History: denies     Family Psychiatric History:      Suicide attempts in all three of her children; no hx of bipolar or schizophrenia in family     Past Medical History:    Past Medical History[1]     Past Surgical History[2]  Allergies[3]    Current Outpatient Medications:    Current Medications[4]    History Review: The following portions of the patient's history were reviewed and updated as  appropriate: allergies, current medications, past family history, past medical history, past social history, past surgical history, and problem list.       OBJECTIVE:     Vital signs in last 24 hours:    There were no vitals filed for this visit.    Mental Status Evaluation:     Appearance age appropriate, casually dressed   Behavior cooperative, mildly anxious   Speech normal rate, normal volume, normal pitch   Mood dysphoric, anxious   Affect normal range and intensity, appropriate   Thought Processes organized, logical, coherent, goal directed, linear, normal rate of thoughts, normal abstract reasoning   Associations intact associations   Thought Content no overt delusions   Perceptual Disturbances: no auditory hallucinations, no visual hallucinations   Abnormal Thoughts  Risk Potential Suicidal ideation - None  Homicidal ideation - None  Potential for aggression - No   Orientation oriented to person, place, time/date, and situation   Memory recent and remote memory grossly intact   Consciousness alert and awake   Attention Span Concentration Span attention span and concentration are age appropriate   Intellect appears to be of average intelligence   Insight intact   Judgement intact   Muscle Strength and  Gait normal muscle strength and normal muscle tone, normal gait and normal balance   Motor activity no abnormal movements   Language no difficulty naming common objects, no difficulty repeating a phrase, no difficulty writing a sentence   Fund of Knowledge adequate knowledge of current events  adequate fund of knowledge regarding past history  adequate fund of knowledge regarding vocabulary    Pain none   Pain Scale 0       Laboratory Results: I have personally reviewed all pertinent laboratory/tests results    Recent Labs:   No visits with results within 1 Month(s) from this visit.   Latest known visit with results is:   Appointment on 04/29/2025   Component Date Value    JETT SYNDROME DNA ANA* 04/29/2025  Not Detected     HEREDITARY BREAST & OVAR* 04/29/2025 Not Detected     FAMILIAL HYPERCHOLESTERO* 04/29/2025 Not Detected        Suicide/Homicide Risk Assessment:    The following interventions are recommended: continue medication management. Although patient's acute lethality risk is low, long-term/chronic lethality risk is mildly elevated in the presence of MDD. At the current moment, Kimberly TERRAZAS is future-oriented, forward-thinking, and demonstrates ability to act in a self-preserving manner as evidenced by volitionally presenting to the clinic today, seeking treatment.To mitigate future risk, patient should adhere to the recommendations of this writer, avoid alcohol/illicit substance use, utilize community-based resources and familiar support and prioritize mental health treatment.     Presently, patient denies suicidal/homicidal ideation in addition to thoughts of self-injury; contracts for safety, see below for risk assessment. At conclusion of evaluation, patient is amenable to the recommendations of this writer including: medication management and psychotherapy.  Also, patient is amenable to calling/contacting the outpatient office including this writer if any acute adverse effects of their medication regimen arise in addition to any comments or concerns pertaining to their psychiatric management.  Patient is amenable to calling/contacting crisis and/or attending to the nearest emergency department if their clinical condition deteriorates to assure their safety and stability, stating that they are able to appropriately confide in their psychiatrist and psychotherapist regarding their psychiatric state.     At this juncture, inpatient hospitalization is not currently warranted. The following interventions are recommended:   no intervention changes    To mitigate future risk, patient should adhere to the recommendations of this writer, avoid alcohol/illicit substance use, utilize community-based resources and  familiar support and prioritize mental health treatment.     Psychotherapy Provided:     Individual psychotherapy provided: Yes     Treatment Plan:    Completed and signed during the session: Not applicable - Treatment Plan not due at this session    Note Share:    This note was not shared with the patient due to reasonable likelihood of causing patient harm    Visit Time    Visit Start Time: 8:30 AM  Visit Stop Time: 9:10 AM  Total Visit Duration: 40 minutes    Alex Espino MD 06/17/25         [1]   Past Medical History:  Diagnosis Date    Anxiety     Depression 2013    Disease of thyroid gland     Heart murmur     Obesity 1979    Osteoarthritis 2024    S/p partial hysterectomy with remaining cervical stump 05/03/2018   [2]   Past Surgical History:  Procedure Laterality Date    TOTAL ABDOMINAL HYSTERECTOMY      TUBAL LIGATION      WISDOM TOOTH EXTRACTION     [3]   Allergies  Allergen Reactions    Effexor [Venlafaxine] Hives     Patient developed hives when taking this medication. Improved with stopping medication and returned when patient retried medication on her own.    [4]   Current Outpatient Medications   Medication Sig Dispense Refill    aspirin (ECOTRIN LOW STRENGTH) 81 mg EC tablet Take 81 mg by mouth      celecoxib (CeleBREX) 100 mg capsule Take 1 capsule (100 mg total) by mouth 2 (two) times a day 180 capsule 1    citalopram (CeleXA) 40 mg tablet Take 1 tablet (40 mg total) by mouth daily 30 tablet 2    DULoxetine (CYMBALTA) 20 mg capsule take 1 capsule by mouth twice a day 180 capsule 2    ergocalciferol (VITAMIN D2) 50,000 units Take 1 capsule (50,000 Units total) by mouth once a week 12 capsule 5    Esketamine HCl, 56 MG Dose, (SPRAVATO) 28 MG/DEVICE nasal spray 2 sprays by Intranasal route 2 (two) times a week for 15 days (Patient not taking: Reported on 5/14/2025)      hydroCHLOROthiazide 25 mg tablet Take one po three times/week 30 tablet 0    levothyroxine 75 mcg tablet Take 1  tablet (75 mcg total) by mouth daily 90 tablet 3    Multiple Vitamins-Minerals (MULTIVITAMIN ADULTS 50+) TABS Take by mouth      rosuvastatin (CRESTOR) 10 MG tablet Take 1 tablet (10 mg total) by mouth daily 90 tablet 3     No current facility-administered medications for this visit.

## 2025-06-17 NOTE — ASSESSMENT & PLAN NOTE
Celexa 40mg once per day   PARQ completed including serotonin syndrome, SIADH, worsening depression, suicidality, induction of nakul, GI upset, headaches, activation, sexual side effects, sedation, potential drug interactions, and others.   Cymbalta 20mg DR twice per day  PARQ completed including serotonin syndrome, SIADH, worsened depression/suicidality, induction of nakul, blood pressure changes and GI distress, weight gain, sexual side effects, insomnia, sedation, potential for drug interactions, and others.         
DISPLAY PLAN FREE TEXT

## 2025-06-19 ENCOUNTER — OFFICE VISIT (OUTPATIENT)
Dept: PHYSICAL THERAPY | Facility: HOME HEALTHCARE | Age: 65
End: 2025-06-19
Attending: INTERNAL MEDICINE
Payer: COMMERCIAL

## 2025-06-19 DIAGNOSIS — M25.512 BILATERAL SHOULDER PAIN, UNSPECIFIED CHRONICITY: Primary | ICD-10-CM

## 2025-06-19 DIAGNOSIS — M25.511 BILATERAL SHOULDER PAIN, UNSPECIFIED CHRONICITY: Primary | ICD-10-CM

## 2025-06-19 DIAGNOSIS — M17.0 PRIMARY OSTEOARTHRITIS OF BOTH KNEES: ICD-10-CM

## 2025-06-19 PROCEDURE — 97110 THERAPEUTIC EXERCISES: CPT | Performed by: PHYSICAL THERAPIST

## 2025-06-19 NOTE — PROGRESS NOTES
"Daily Note     Today's date: 2025  Patient name: Kimberly Montanez  : 1960  MRN: [id number]  Referring provider: Batsheva Gomes DO  Dx:   Encounter Diagnosis     ICD-10-CM    1. Bilateral shoulder pain, unspecified chronicity  M25.511     M25.512       2. Primary osteoarthritis of both knees  M17.0                      Subjective:       Objective: See treatment diary below      Assessment: Pt able to increase reps with standing hip strengtheners this date. She continues to have greater difficulty with UE portion of the program and unable to progress ROM due to severe pain in B GHJ; pt reporting 10/10 pain in both shoulders today. PT encouraged pt to continue with exercises within pain free ROM to maintain current gains.     Plan: Continue per plan of care.        Visit Count 8 9 1 - RE 2 3      Manuals 6-5                    Neuro Re-Ed                         Ther Ex 6-5       NuStep for ROM and conditioning  L2 10 min  L3 10 minutes  L3 10 minutes  L3 10'  L3 10 min    HR/TR X 20 X 20  X 15  X 20 ea  X 20 ea   Standing marches    1# x 15  1# x 15 1# x 20   Standing hip flex  0#   X 20 ea  1#   X 15 ea  1# x 15  1# x 15 1# x 20    Standing hip abd  1.5#   X 15 ea  1# x 15 ea  1# x 15  1# x 15 1# x 20   Seated hip add 5\" x 20        Seated LAQ 1.5#  5\"  1x 15 1# 5\" x 15  Walking 3/4 lap around gym with 1#  Walking 1 lap around gym with 1#  Walking 1 lap around gym with 1#    Heel slides  1x       QS  5\" x 20 luis angel 5\" x 20   5\" x 20  5\" x 20    SAQ  2 x 10 luis angel 5\" x 20  5\" x 20  5\" x 20 Luis Angel 5\" x 20    Supine SLR X 10 luis angel  X 10  10  X 10 luis angel  X 10    Shld shrugs  X 20  X 20  X 20  X 20  X 20   Retractions  X 20  X 20  X 20  X 20  X 20   Pulleys: flex/scap  Flex/scap   X 20 luis angel  Flex/Scap   X 10 luis angel  Flex/scap  X 10 ea  Flex/scap   X 10 ea Luis Angel Flex/scap   X 10 ea luis angel    Elbow curls  1# x 20  2# x 10  2# x 20  2# x 20   Cane flex Seated x 20  Seated x 20  Seated x 10  Seated x 20  Seated x " 20 within pain free ROM   Hip abd  Yellow  1x20 Yellow   X 20                               Ther Activity                        Gait Training                        Modalities

## 2025-06-23 ENCOUNTER — OFFICE VISIT (OUTPATIENT)
Dept: PHYSICAL THERAPY | Facility: HOME HEALTHCARE | Age: 65
End: 2025-06-23
Attending: INTERNAL MEDICINE
Payer: COMMERCIAL

## 2025-06-23 DIAGNOSIS — M25.511 BILATERAL SHOULDER PAIN, UNSPECIFIED CHRONICITY: Primary | ICD-10-CM

## 2025-06-23 DIAGNOSIS — M25.512 BILATERAL SHOULDER PAIN, UNSPECIFIED CHRONICITY: Primary | ICD-10-CM

## 2025-06-23 DIAGNOSIS — M17.0 PRIMARY OSTEOARTHRITIS OF BOTH KNEES: ICD-10-CM

## 2025-06-23 PROCEDURE — 97110 THERAPEUTIC EXERCISES: CPT | Performed by: PHYSICAL THERAPIST

## 2025-06-23 NOTE — PROGRESS NOTES
"Daily Note     Today's date: 2025  Patient name: Kimberly Montanez  : 1960  MRN: [id number]  Referring provider: Batsheva Gomes DO  Dx:   Encounter Diagnosis     ICD-10-CM    1. Bilateral shoulder pain, unspecified chronicity  M25.511     M25.512       2. Primary osteoarthritis of both knees  M17.0                      Subjective: Pt states that the shoulders are still bothering her quite a bit.       Objective: See treatment diary below      Assessment: Pt tolerable to full program this date.  She demonstrates ability to move through exercises without as many rests between reps but does remain with overall fatigue to end session.  PT to continue with gradual progression to address.     Plan: Continue per plan of care.        Visit Count 4 9 1 - RE 2 3      Manuals                    Neuro Re-Ed                         Ther Ex        NuStep for ROM and conditioning  L3 10 minutes  L3 10 minutes  L3 10 minutes  L3 10'  L3 10 min    HR/TR X 20  X 20  X 15  X 20 ea  X 20 ea   Standing marches  1# x 20   1# x 15  1# x 15 1# x 20   Standing hip flex  1# x 20  1#   X 15 ea  1# x 15  1# x 15 1# x 20    Standing hip abd  1# x 20  1# x 15 ea  1# x 15  1# x 15 1# x 20   Seated hip add Supine   5\" x 20        Walking with 1# weights  1 lap around the gym  1# 5\" x 15  Walking 3/4 lap around gym with 1#  Walking 1 lap around gym with 1#  Walking 1 lap around gym with 1#    Heel slides         QS  5\" x 20  5\" x 20   5\" x 20  5\" x 20    SAQ   5\" x 20  5\" x 20  5\" x 20 Luis Angel 5\" x 20    Supine SLR X 10  X 10  10  X 10 luis angel  X 10    Shld shrugs  X 20 X 20  X 20  X 20  X 20   Retractions  X 20  X 20  X 20  X 20  X 20   Pulleys: flex/scap  Flex/Scap   X 10 ea luis angel  Flex/Scap   X 10 luis angel  Flex/scap  X 10 ea  Flex/scap   X 10 ea Luis Angel Flex/scap   X 10 ea luis angel    Elbow curls  2# x 20  2# x 10  2# x 20  2# x 20   Cane flex Seated x 20   Within pain free ROM  Seated x 20  Seated x 10  Seated x 20  Seated x 20 " within pain free ROM   Hip abd  Supine   Yellow   X 20  Yellow   X 20                               Ther Activity                        Gait Training                        Modalities

## 2025-06-26 ENCOUNTER — OFFICE VISIT (OUTPATIENT)
Dept: PHYSICAL THERAPY | Facility: HOME HEALTHCARE | Age: 65
End: 2025-06-26
Attending: INTERNAL MEDICINE
Payer: COMMERCIAL

## 2025-06-26 DIAGNOSIS — M25.512 BILATERAL SHOULDER PAIN, UNSPECIFIED CHRONICITY: Primary | ICD-10-CM

## 2025-06-26 DIAGNOSIS — M25.511 BILATERAL SHOULDER PAIN, UNSPECIFIED CHRONICITY: Primary | ICD-10-CM

## 2025-06-26 DIAGNOSIS — M17.0 PRIMARY OSTEOARTHRITIS OF BOTH KNEES: ICD-10-CM

## 2025-06-26 PROCEDURE — 97110 THERAPEUTIC EXERCISES: CPT | Performed by: PHYSICAL THERAPIST

## 2025-06-26 NOTE — PROGRESS NOTES
"Daily Note     Today's date: 2025  Patient name: Kimberly Montanez  : 1960  MRN: [id number]  Referring provider: Batsheva Gomes DO  Dx:   Encounter Diagnosis     ICD-10-CM    1. Bilateral shoulder pain, unspecified chronicity  M25.511     M25.512       2. Primary osteoarthritis of both knees  M17.0                      Subjective: Pt reporting that the shoulders are still really painful.       Objective: See treatment diary below      Assessment: Pt continues to have limitations with shoulder mobility without significant improvement between sessions.  She was tolerable to increased LE strengthening; able to complete 2 sets of SLR but spread out for mild rest between sets. Continue to work on progression as able.     Plan: Continue per plan of care.        Visit Count 4 5 1 - RE 2 3      Manuals                    Neuro Re-Ed                         Ther Ex        NuStep for ROM and conditioning  L3 10 minutes  L3 10 minutes  L3 10 minutes  L3 10'  L3 10 min    HR/TR X 20  X 20  X 15  X 20 ea  X 20 ea   Standing marches  1# x 20  1# x 20  1# x 15  1# x 15 1# x 20   Standing hip flex  1# x 20  1# x 20  1# x 15  1# x 15 1# x 20    Standing hip abd  1# x 20  1#  x 20  1# x 15  1# x 15 1# x 20   Walking with 1# weights  1 lap around the gym   1# 1 lap around the gym   1# Walking 3/4 lap around gym with 1#  Walking 1 lap around gym with 1#  Walking 1 lap around gym with 1#    SAQ   X 20 luis angel  5\" x 20  5\" x 20 Luis Angel 5\" x 20    Supine SLR X 10  X 10     X 10 spread out from 1st set   10  X 10 luis angel  X 10    Shld shrugs  X 20 X 20  X 20  X 20  X 20   Retractions  X 20  X 20 X 20  X 20  X 20   Pulleys: flex/scap  Flex/Scap   X 10 ea luis angel  Flex/scap   X 10 ea luis angel  Flex/scap  X 10 ea  Flex/scap   X 10 ea Luis Angel Flex/scap   X 10 ea luis angel    Elbow curls  2# x 20  2# x 20   2# x 20  2# x 20   Cane flex Seated x 20   Within pain free ROM  Seated x 20   Within pain free ROM  Seated x 10  Seated x 20  " Seated x 20 within pain free ROM   Hip abd  Supine   Yellow   X 20  Supine   Yellow x 20       Hip add   X 20                       Ther Activity                        Gait Training                        Modalities

## 2025-06-30 ENCOUNTER — OFFICE VISIT (OUTPATIENT)
Dept: PHYSICAL THERAPY | Facility: HOME HEALTHCARE | Age: 65
End: 2025-06-30
Attending: INTERNAL MEDICINE
Payer: COMMERCIAL

## 2025-06-30 DIAGNOSIS — M17.0 PRIMARY OSTEOARTHRITIS OF BOTH KNEES: ICD-10-CM

## 2025-06-30 DIAGNOSIS — M25.511 BILATERAL SHOULDER PAIN, UNSPECIFIED CHRONICITY: Primary | ICD-10-CM

## 2025-06-30 DIAGNOSIS — M25.512 BILATERAL SHOULDER PAIN, UNSPECIFIED CHRONICITY: Primary | ICD-10-CM

## 2025-06-30 PROCEDURE — 97110 THERAPEUTIC EXERCISES: CPT

## 2025-06-30 NOTE — PROGRESS NOTES
"Daily Note     Today's date: 2025  Patient name: Kimberly Montanez  : 1960  MRN: [id number]  Referring provider: Batsheva Gomes DO  Dx:   Encounter Diagnosis     ICD-10-CM    1. Bilateral shoulder pain, unspecified chronicity  M25.511     M25.512       2. Primary osteoarthritis of both knees  M17.0           Start Time: 0830  Stop Time: 0915  Total time in clinic (min): 45 minutes    Subjective: Patient reports pain in B/L shoulders.  Patient rates pain as 6/10.      Objective: See treatment diary below      Assessment: Tolerated treatment well.   Patient participated in skilled PT session focused on strengthening, stretching, and ROM.  Patient able to complete exercise program with no relief in B/L shoulder pain.  Patient needs v.c. to not go into the pain with the shoulder exercises.  Patient with no complaints of LE exercises.  Patient would continue to benefit from skilled PT interventions to address strengthening, stretching, and ROM. Patient demonstrated fatigue post treatment      Plan: Continue per plan of care.        Visit Count 4 5 6 2 3      Manuals                    Neuro Re-Ed                         Ther Ex        NuStep for ROM and conditioning  L3 10 minutes  L3 10 minutes  L3 x 10' L3 10'  L3 10 min    HR/TR X 20  X 20  30x ea X 20 ea  X 20 ea   Standing marches  1# x 20  1# x 20  1# 20x 1# x 15 1# x 20   Standing hip flex  1# x 20  1# x 20  1# 20x 1# x 15 1# x 20    Standing hip abd  1# x 20  1#  x 20  1# 20x 1# x 15 1# x 20   Walking with 1# weights  1 lap around the gym   1# 1 lap around the gym   1# 1 lap around the gym 1# B/L Walking 1 lap around gym with 1#  Walking 1 lap around gym with 1#    SAQ   X 20 luis angel  20x B/L 5\" x 20 Luis Angel 5\" x 20    Supine SLR X 10  X 10     X 10 spread out from 1st set  10x     10x spread out from 1st set X 10 luis angel  X 10    Shld shrugs  X 20 X 20  20x  X 20  X 20   Retractions  X 20  X 20 20x  X 20  X 20   Pulleys: flex/scap  " Flex/Scap   X 10 ea luis angel  Flex/scap   X 10 ea luis angel  Flex/scap  10x ea B/L Flex/scap   X 10 ea Luis Angel Flex/scap   X 10 ea luis angel    Elbow curls  2# x 20  2# x 20   2# x 20  2# x 20   Cane flex Seated x 20   Within pain free ROM  Seated x 20   Within pain free ROM  Seated 20x within pain free RO Seated x 20  Seated x 20 within pain free ROM   Hip abd  Supine   Yellow   X 20  Supine   Yellow x 20  Seated YTB 20x       Hip add   X 20  Seated 20x                     Ther Activity                        Gait Training                        Modalities

## 2025-07-01 ENCOUNTER — VBI (OUTPATIENT)
Dept: ADMINISTRATIVE | Facility: OTHER | Age: 65
End: 2025-07-01

## 2025-07-03 ENCOUNTER — APPOINTMENT (OUTPATIENT)
Dept: PHYSICAL THERAPY | Facility: HOME HEALTHCARE | Age: 65
End: 2025-07-03
Attending: INTERNAL MEDICINE
Payer: COMMERCIAL

## 2025-07-07 ENCOUNTER — OFFICE VISIT (OUTPATIENT)
Dept: PHYSICAL THERAPY | Facility: HOME HEALTHCARE | Age: 65
End: 2025-07-07
Attending: INTERNAL MEDICINE
Payer: COMMERCIAL

## 2025-07-07 DIAGNOSIS — M25.512 BILATERAL SHOULDER PAIN, UNSPECIFIED CHRONICITY: Primary | ICD-10-CM

## 2025-07-07 DIAGNOSIS — M17.0 PRIMARY OSTEOARTHRITIS OF BOTH KNEES: ICD-10-CM

## 2025-07-07 DIAGNOSIS — M25.511 BILATERAL SHOULDER PAIN, UNSPECIFIED CHRONICITY: Primary | ICD-10-CM

## 2025-07-07 PROCEDURE — 97110 THERAPEUTIC EXERCISES: CPT | Performed by: PHYSICAL THERAPIST

## 2025-07-07 NOTE — PROGRESS NOTES
"Daily Note     Today's date: 2025  Patient name: Kimberly Montanez  : 1960  MRN: [id number]  Referring provider: Batsheva Gomes DO  Dx:   Encounter Diagnosis     ICD-10-CM    1. Bilateral shoulder pain, unspecified chronicity  M25.511     M25.512       2. Primary osteoarthritis of both knees  M17.0                      Subjective: Pt reporting that her shoulders still hurt but a little bit more tolerable than last week.       Objective: See treatment diary below      Assessment: Pt with increasing soreness in the shoulders with program but able to tolerate addition of incline slider into flexion. LE with fatigue as appropriate but no increase in pain noted.  PT encourages pt to continue with gradual progression to make long term improvements to functional mobility.     Plan: Continue per plan of care.        Visit Count 4 5 6 7 3      Manuals                    Neuro Re-Ed                         Ther Ex        NuStep for ROM and conditioning  L3 10 minutes  L3 10 minutes  L3 x 10' L3 x 10 '  L3 10 min    HR/TR X 20  X 20  30x ea X 30 ea  X 20 ea   Standing marches  1# x 20  1# x 20  1# 20x 1# x 20 1# x 20   Standing hip flex  1# x 20  1# x 20  1# 20x 1# x 20  1# x 20    Standing hip abd  1# x 20  1#  x 20  1# 20x 1# x 20  1# x 20   Walking with 1# weights  1 lap around the gym   1# 1 lap around the gym   1# 1 lap around the gym 1# B/L 1 lap around gym 1# prem  Walking 1 lap around gym with 1#    SAQ   X 20 prem  20x B/L  5\" x 20    Supine SLR X 10  X 10     X 10 spread out from 1st set  10x     10x spread out from 1st set X 10     10x spread out from 1st set  X 10    Shld shrugs  X 20 X 20  20x  X 20  X 20   Retractions  X 20  X 20 20x  X 20  X 20   Pulleys: flex/scap  Flex/Scap   X 10 ea prem  Flex/scap   X 10 ea prem  Flex/scap  10x ea B/L Flex/Scap   L side x 20   R side x 10  Flex/scap   X 10 ea prem    Elbow curls  2# x 20  2# x 20   2# x 20  2# x 20   Cane flex Seated x 20 "   Within pain free ROM  Seated x 20   Within pain free ROM  Seated 20x within pain free RO Seated x 20    Seated x 20 within pain free ROM   Incline slider     0#   X 10 fwd prem     Hip abd  Supine   Yellow   X 20  Supine   Yellow x 20  Seated YTB 20x   Supine YTB   X 20     Hip add   X 20  Seated 20x X 20                     Ther Activity                        Gait Training                        Modalities

## 2025-07-10 ENCOUNTER — OFFICE VISIT (OUTPATIENT)
Dept: PHYSICAL THERAPY | Facility: HOME HEALTHCARE | Age: 65
End: 2025-07-10
Attending: INTERNAL MEDICINE
Payer: COMMERCIAL

## 2025-07-10 DIAGNOSIS — M25.511 BILATERAL SHOULDER PAIN, UNSPECIFIED CHRONICITY: Primary | ICD-10-CM

## 2025-07-10 DIAGNOSIS — M25.512 BILATERAL SHOULDER PAIN, UNSPECIFIED CHRONICITY: Primary | ICD-10-CM

## 2025-07-10 DIAGNOSIS — M17.0 PRIMARY OSTEOARTHRITIS OF BOTH KNEES: ICD-10-CM

## 2025-07-10 PROCEDURE — 97110 THERAPEUTIC EXERCISES: CPT

## 2025-07-10 NOTE — PROGRESS NOTES
"Daily Note     Today's date: 7/10/2025  Patient name: Kimberly Montanez  : 1960  MRN: [id number]  Referring provider: Batsheva Gomes DO  Dx:   Encounter Diagnosis     ICD-10-CM    1. Bilateral shoulder pain, unspecified chronicity  M25.511     M25.512       2. Primary osteoarthritis of both knees  M17.0                      Subjective: Pt reports her shoulder pain kept her awake last night her knees are \"grinding\" today.       Objective: See treatment diary below      Assessment: Tolerated treatment session with verbal cues needed for correct form with exercises. Progressed to step ups with B step today which pt was challenged with. Pt reports pain Luis Angel knees during step up exercise. Fatigue noted during exercises. Patient would benefit from continued PT      Plan: Continue per plan of care.        Visit Count 4 5 6 7 8      Manuals 6/23 6/26 6/30 7/7 7/10                   Neuro Re-Ed                         Ther Ex        NuStep for ROM and conditioning  L3 10 minutes  L3 10 minutes  L3 x 10' L3 x 10 '  L3 10 min    HR/TR X 20  X 20  30x ea X 30 ea  X 20 ea   Standing marches  1# x 20  1# x 20  1# 20x 1# x 20 1# x 20   Standing hip flex  1# x 20  1# x 20  1# 20x 1# x 20  1# x 20    Standing hip abd  1# x 20  1#  x 20  1# 20x 1# x 20  1# x 20   Walking with 1# weights  1 lap around the gym   1# 1 lap around the gym   1# 1 lap around the gym 1# B/L 1 lap around gym 1# luis angel  Walking 1 lap around gym with 1#    SAQ   X 20 luis angel  20x B/L     Supine SLR X 10  X 10     X 10 spread out from 1st set  10x     10x spread out from 1st set X 10     10x spread out from 1st set  X 10     10x spread out from 1st set    Shld shrugs  X 20 X 20  20x  X 20  X 20   Retractions  X 20  X 20 20x  X 20  X 20   Pulleys: flex/scap  Flex/Scap   X 10 ea luis angel  Flex/scap   X 10 ea luis angel  Flex/scap  10x ea B/L Flex/Scap   L side x 20   R side x 10  Flex/scap   L side x 20  R side x 10    Elbow curls  2# x 20  2# x 20   2# x 20  2# x 20   Cane " flex Seated x 20   Within pain free ROM  Seated x 20   Within pain free ROM  Seated 20x within pain free RO Seated x 20    Seated x 20   Incline slider     0#   X 10 fwd luis angel  0#   2 X 10 fwd Luis Angel    Hip abd  Supine   Yellow   X 20  Supine   Yellow x 20  Seated YTB 20x   Supine YTB   X 20  Seated  YTB  X20    Hip add   X 20  Seated 20x X 20  X20   Step ups      Fwd B   1x10 Luis Angel            Ther Activity                        Gait Training                        Modalities

## 2025-07-14 ENCOUNTER — OFFICE VISIT (OUTPATIENT)
Dept: PHYSICAL THERAPY | Facility: HOME HEALTHCARE | Age: 65
End: 2025-07-14
Attending: INTERNAL MEDICINE
Payer: COMMERCIAL

## 2025-07-14 DIAGNOSIS — M25.511 BILATERAL SHOULDER PAIN, UNSPECIFIED CHRONICITY: Primary | ICD-10-CM

## 2025-07-14 DIAGNOSIS — M17.0 PRIMARY OSTEOARTHRITIS OF BOTH KNEES: ICD-10-CM

## 2025-07-14 DIAGNOSIS — M25.512 BILATERAL SHOULDER PAIN, UNSPECIFIED CHRONICITY: Primary | ICD-10-CM

## 2025-07-14 PROCEDURE — 97110 THERAPEUTIC EXERCISES: CPT

## 2025-07-14 NOTE — PROGRESS NOTES
"Daily Note     Today's date: 2025  Patient name: Kimberly Montanez  : 1960  MRN: [id number]  Referring provider: Batsheva Gomes DO  Dx:   Encounter Diagnosis     ICD-10-CM    1. Bilateral shoulder pain, unspecified chronicity  M25.511     M25.512       2. Primary osteoarthritis of both knees  M17.0                      Subjective: Pt reports she has pain in her L shoulder > R shoulder. Pt reports \"grinding\" in her knees.       Objective: See treatment diary below      Assessment: Tolerated treatment fair. Pt challenged with step up exercise. Fatigue noted during exercises. Pt reports same pain as start of session in her shoulders and knees during exercises, no increased pain. Patient would benefit from continued PT      Plan: Continue per plan of care.        Visit Count 9 5 6 7 8      Manuals 7/14   6/26 6/30 7/7 7/10                   Neuro Re-Ed                         Ther Ex        NuStep for ROM and conditioning  L3 10 minutes  L3 10 minutes  L3 x 10' L3 x 10 '  L3 10 min    HR/TR X 20  ea X 20  30x ea X 30 ea  X 20 ea   Standing marches  1# x 20  1# x 20  1# 20x 1# x 20 1# x 20   Standing hip flex  1# x 20  1# x 20  1# 20x 1# x 20  1# x 20    Standing hip abd  1# x 20  1#  x 20  1# 20x 1# x 20  1# x 20   Walking with 1# weights  1 lap around the gym   1# 1 lap around the gym   1# 1 lap around the gym 1# B/L 1 lap around gym 1# prem  Walking 1 lap around gym with 1#    SAQ   X 20 prem  20x B/L     Supine SLR X 10     10x spread out from 1st set  X 10     X 10 spread out from 1st set  10x     10x spread out from 1st set X 10     10x spread out from 1st set  X 10     10x spread out from 1st set    Shld shrugs  X 20 X 20  20x  X 20  X 20   Retractions  X 20  X 20 20x  X 20  X 20   Pulleys: flex/scap  Flex/Scap   X 20 ea prem  Flex/scap   X 10 ea prem  Flex/scap  10x ea B/L Flex/Scap   L side x 20   R side x 10  Flex/scap   L side x 20  R side x 10    Elbow curls  2# x 20  2# x 20   2# x 20  2# x 20 "   Cane flex Seated x 20    Seated x 20   Within pain free ROM  Seated 20x within pain free RO Seated x 20    Seated x 20   Incline slider  0#   2x10 fwd Luis Angel    0#   X 10 fwd luis angel  0#   2 X 10 fwd Luis Angel    Hip abd  Supine   Yellow   X 20  Supine   Yellow x 20  Seated YTB 20x   Supine YTB   X 20  Seated  YTB  X20    Hip add  X 20  X 20  Seated 20x X 20  X20   Step ups  Fwd B   1x    Fwd B   1x10 Luis Angel            Ther Activity                        Gait Training                        Modalities

## 2025-07-15 ENCOUNTER — OFFICE VISIT (OUTPATIENT)
Age: 65
End: 2025-07-15

## 2025-07-15 DIAGNOSIS — F33.2 MAJOR DEPRESSIVE DISORDER, RECURRENT SEVERE WITHOUT PSYCHOTIC FEATURES (HCC): Primary | ICD-10-CM

## 2025-07-15 DIAGNOSIS — F41.1 GENERALIZED ANXIETY DISORDER: ICD-10-CM

## 2025-07-15 PROCEDURE — PBNCHG PB NO CHARGE PLACEHOLDER: Performed by: PSYCHIATRY & NEUROLOGY

## 2025-07-15 RX ORDER — FLUOXETINE 10 MG/1
CAPSULE ORAL
Qty: 21 CAPSULE | Refills: 0 | Status: SHIPPED | OUTPATIENT
Start: 2025-07-15 | End: 2025-07-29 | Stop reason: SDUPTHER

## 2025-07-15 RX ORDER — CITALOPRAM HYDROBROMIDE 40 MG/1
TABLET ORAL
Qty: 4 TABLET | Refills: 0 | Status: SHIPPED | OUTPATIENT
Start: 2025-07-15 | End: 2025-07-29 | Stop reason: ALTCHOICE

## 2025-07-15 NOTE — PSYCH
PSYCHIATRIC EVALUATION     [facility]    Name and Date of Birth:  Kimberly Montanez 65 y.o. 1960 MRN: [id number]    Date of Visit: July 15, 2025    Reason for visit: Transfer of Care Psychiatric Evaluation     Assessment/Plan:   Kimberly Montanez is a 65 y.o.  female, , lives alone, currently employed, with past medical history of hypothyroidism, polycythemia, hyperlipidemia and past psychiatric history of anxiety and depression, no prior psychiatric admissions, no prior suicide attempts, who presented to [facility] for transfer of care. Today, she continued to endorse depression and anxiety unchanged from her previous visits and reported no significant benefit from her current medication regimen. After discussion, she was agreeable to cross taper Celexa to Prozac. She will decrease Celexa 40 mg QD to Celexa 20 mg QD for one week before discontinuing. She will begin Prozac 10 mg QD for one week for increasing to Prozac 20 mg QD. She will continue her Cymbalta 20 mg BID. She will follow-up in three weeks for medication management.        Assessment & Plan  Major depressive disorder, recurrent severe without psychotic features (HCC)  Decrease Celexa 40 mg QD to Celexa 20 mg QD for one week, then discontinue the medication  Begin Prozac 10 mg QD for one week, then increase to Prozac 20 mg at the same time Celexa is discontinued  PARQ completed including serotonin syndrome, SIADH, worsening depression, suicidality, induction of nakul, GI upset, headaches, activation, sexual side effects, sedation, potential drug interactions, and others.   Continue Cymbalta 20 mg BID  PARQ completed including serotonin syndrome, SIADH, worsened depression/suicidality, induction of nakul, blood pressure changes and GI distress, weight gain, sexual side effects, insomnia, sedation, potential for drug interactions, and others.    Orders:    citalopram  "(CeleXA) 40 mg tablet; Reduce Celexa 40 mg to Celexa 20 mg for 7 days, then discontinue the medication    FLUoxetine (PROzac) 10 mg capsule; Take 1 capsule (10 mg total) by mouth daily for 7 days, THEN 2 capsules (20 mg total) daily for 7 days.    Generalized anxiety disorder    Orders:    citalopram (CeleXA) 40 mg tablet; Reduce Celexa 40 mg to Celexa 20 mg for 7 days, then discontinue the medication    FLUoxetine (PROzac) 10 mg capsule; Take 1 capsule (10 mg total) by mouth daily for 7 days, THEN 2 capsules (20 mg total) daily for 7 days.        Treatment Recommendations/Precautions:  Continue Cymbalta 20 mg BID, decrease Celexa 40 mg QD to Celexa 20 mg QD for one week and then discontinue, and begin Prozac 10 mg QD for one week and then increase to Prozac 20 mg QD  Follow-up for medication management in 3 weeks  Aware of 24 hour and weekend coverage for urgent situations accessed by calling [facility] main practice number    Medications Risks/Benefits:      Risks, Benefits And Possible Side Effects Of Medications:    Risks, benefits, and possible side effects of medications explained to Kimberly TERRAZAS and she verbalizes understanding and agreement for treatment.    Controlled Medication Discussion:     Not applicable    Treatment Plan:  The Treatment Plan was completed but not signed. If done today, the next plan will be due January 11, 2026.       Chief complaint: \"My life is a mess\".    History of Present Illness (HPI):      Kimberyl Montanez is a 65 y.o. Female, with three children, high school education, employed as healthcare aide, with past medical history of hypothyroidism, HLD, and polycythemia, and past psychiatric history of anxiety and depression, no prior suicide attempts, no prior [facility] admissions, no substance use, who presents to the [facility] outpatient clinic for intake assessment/Transfer of Care. Patient was referred by Dr. Espino.       Today on " "evaluation, Kimberly TERRAZAS presents reporting continued depressive symptoms including hopelessness, poor energy, decreased motivation, guilt, and poor concentration. She endorses anxiety related to her psychosocial stressors that include dysfunctional relationship with her children, limited support system, her physical health, and loneliness. She does show ability to brighten and stated that she has recently lost 40 lbs with the help of her support group [employer]. This group has additionally provided her with social interaction and new acquaintances that she hopes will further develop into friendships. She often feels as if there is no purpose to her day and states that she often just sits alone in her house with no motivation to engage in activities, clean, or get outside. She admits that a major barrier to daily activity is pain from her arthritis in multiple joints, and she does not feel that her Cymbalta alleviates her pain in any way. She denies suicidal and homicidal ideation, auditory and visual hallucinations, and clear history of hypomanic or manic episodes. Karen listed a plethora of difficulties with her life that feel insurmountable. Journaling was encouraged to categorize her stressors into \"changeable\" and \"unchangable\", with the results to be reviewed at her next appointment. She is interested in medication changes as she does not feel significant benefit from her current medication regimen. She reports that Effexor made the biggest positive change in her psychiatric symptoms, but she began to develop hives soon after initiation and was forced to discontinue the medication. Prozac was discussed as a potential option to provide additional energy and motivation, and Karen was agreeable to cross taper her Celexa to Prozac after discussion of risks, benefits, and side effects of the medication.       Current Rating Scores:     None completed today.    Psychiatric Review Of Systems:    Appetite: no  Adverse eating: " no  Weight changes: yes, decreased purposefully through weight loss program  Insomnia/sleeplessness: yes  Fatigue/anergy: yes  Anhedonia/lack of interest: yes  Attention/concentration: yes  Psychomotor agitation/retardation: no  Somatic symptoms: no  Anxiety/panic attack: yes  Radha/hypomania: no  Hopelessness/helplessness/worthlessness: yes  Self-injurious behavior/high-risk behavior: no  Suicidal ideation: no  Homicidal ideation: no  Auditory hallucinations: no  Visual hallucinations: no  Other perceptual disturbances: no  Delusional thinking: no  Obsessive/compulsive symptoms: no    Review Of Systems:  Pertinent ROS noted in HPI      Family Psychiatric History:     Family History[1]  Suicide attempts in all three of her children.    Denies substance abuse or suicidality in immediate relations.     Past Psychiatric History:     Past psychiatric diagnoses:   Depression, anxiety  Inpatient psychiatric admissions:   None  Prior outpatient psychiatric treatment:   Yes, with Dr. Espino  Past/current psychotherapy:   Yes, currently with [facility]  History of suicidal attempts/gestures:   None   History of non-suicidal self-injurious behavior:   Denies  History of violence/aggressive behaviors:   Denies  Psychotropic medication trials:   Antidepressants:  Celexa, wellbutrin, effexor, venlafaxine, cymbalta  Antipsychotics:  None  Mood stabilizers:  None  Anxiolytics:  Atarax  Others:  Ketamine   Substance abuse inpatient/outpatient rehabilitation:   None  Eating disorder history:   no       Substance Abuse History:    Patient denies history of alcohol, illict substance, or tobacco abuse. Patient denies previous legal actions or arrests related to substance intoxication including prior DWIs/DUIs. Kimberly TERRAZAS does not apear under the influence or withdrawal of any psychoactive substance throughout today's examination.     Social History:    Developmental: No history of milestone/developmental delay. No known history of  in-utero exposure to toxins/illicit substances. There is no documented history of IEP or need for special education.  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 Kimberly Montanez has no history of arrests or violence pertaining to use of a deadly weapon.     Traumatic History:     Abuse:emotional and verbal  Other Traumatic Events: none reported    Past Medical History:    Past Medical History[2]     Past Surgical History[3]  Allergies[4]    History Review:    The following portions of the patient's history were reviewed and updated as appropriate: allergies, current medications, past family history, past medical history, past social history, past surgical history, and problem list.    OBJECTIVE:    Vital signs in last 24 hours:    There were no vitals filed for this visit.    Mental Status Evaluation:    Appearance:  alert, good eye contact, appears stated age, casually dressed, and appropriate grooming and hygiene   Behavior:  calm and cooperative   Motor: no abnormal movements and stable gait with assistive device   Speech:  spontaneous and coherent   Mood:  dysphoric   Affect:  mood-congruent   Thought Process:  perseverative   Thought Content: no verbalized delusions or overt paranoia   Associations intact associations   Perceptual disturbances: no reported hallucinations and does not appear to be responding to internal stimuli at this time   Risk Potential: No active or passive suicidal or homicidal ideation was verbalized during interview  Potential for aggression - No   Cognition: oriented to self and situation, appears to be of average intelligence, and cognition not formally tested   Attention: attention span and concentration are age appropriate      Insight:  Fair   Judgment: Fair        Laboratory Results: I have personally reviewed all pertinent laboratory/tests results    Recent Labs:   No visits with results within 1 Month(s) from this visit.   Latest known visit with results is:   Appointment on 04/29/2025   Component Date Value    JETT SYNDROME DNA ANA* 04/29/2025 Not Detected     HEREDITARY BREAST & OVAR* 04/29/2025 Not Detected     FAMILIAL HYPERCHOLESTERO* 04/29/2025 Not Detected        Suicide/Homicide Risk Assessment:    Risk of Harm to Self:  The following ratings are based on assessment at the time of the interview and review of records  Demographic risk factors include: ,  status, age: over 50 or older  Historical Risk Factors include: chronic psychiatric problems  Recent Specific Risk Factors include: diagnosis of mood disorder, social isolation  Protective Factors: no current suicidal ideation, access to mental health treatment, compliant with medications, compliant with mental health treatment, having a desire to be alive, having pets, restricted access to lethal means, stable living environment  Weapons: none. The following steps have been taken to ensure weapons are properly secured: not applicable  Based on today's assessmentKimberly presents the following risk of harm to self: low    Risk of Harm to Others:  The following ratings are based on assessment at the time of the interview and review of records  Demographic Risk Factors include: none.  Historical Risk Factors include: none.  Recent Specific Risk Factors include: none.  Protective Factors: no current homicidal ideation, compliant with medications, compliant with mental health treatment, good impulse control, no substance use problems, opportunities to participate in community, restricted access to lethal means, safe and stable living environment  Weapons: none. The following steps have been taken to ensure weapons are properly secured: not applicable  Based on today's assessmentKimberly  presents the following risk of harm to others: low    The following interventions are recommended: continue medication management. Although patient's acute lethality risk is low, long-term/chronic lethality risk is mildly elevated in the presence MDD. At the current moment, Kimberly TERRAZAS is future-oriented, forward-thinking, and demonstrates ability to act in a self-preserving manner as evidenced by volitionally presenting to the clinic today, seeking treatment. At this juncture, inpatient hospitalization is not currently warranted. To mitigate future risk, patient should adhere to the recommendations of this writer, avoid alcohol/illicit substance use, utilize community-based resources and familiar support and prioritize mental health treatment.     Based on today's assessment and clinical criteria, Kimberly Montanez contracts for safety and is not an imminent risk of harm to self or others. Outpatient level of care is deemed appropriate at this present time. Kimberly TERRAZAS understands that if they are no longer able to contract for safety, they need to call/contact the outpatient office including this writer, call/contact crisis and/orattend to the nearest Emergency Department for immediate evaluation.    This note was not shared with the patient due to reasonable likelihood of causing patient harm    Visit Time  Visit Start Time: 8:50  Visit Stop Time: 9:40  Total Visit Duration: 50 minutes    Jyotsna Garnica MD 07/15/25         [1]   Family History  Problem Relation Name Age of Onset    Arthritis Mother Kerline Tong     Osteoporosis Mother Kerline Tong     Hypertension Father Seven Alycia     Dementia Father Seven Alycia     Arthritis Father Seven Alycia     Osteoporosis Father Seven Alycia     Depression Daughter Katrin Che     Depression Daughter Viviane Canaleser     ADD / ADHD Son Chino Canaleser     Depression Son Chino Montanez     No Known Problems Paternal Aunt      Breast cancer Cousin     [2]   Past  Medical History:  Diagnosis Date    Anxiety     Depression 2013    Disease of thyroid gland     Heart murmur     Obesity 1979    Osteoarthritis 2024    S/p partial hysterectomy with remaining cervical stump 05/03/2018   [3]   Past Surgical History:  Procedure Laterality Date    TOTAL ABDOMINAL HYSTERECTOMY      TUBAL LIGATION      WISDOM TOOTH EXTRACTION     [4]   Allergies  Allergen Reactions    Effexor [Venlafaxine] Hives     Patient developed hives when taking this medication. Improved with stopping medication and returned when patient retried medication on her own.

## 2025-07-15 NOTE — BH TREATMENT PLAN
TREATMENT PLAN - Medication Management        [facility]    Name and Date of Birth:  Kimberly Montanez 65 y.o. 1960  Date of Treatment Plan: July 15, 2025  Diagnosis/Diagnoses:    1. Major depressive disorder, recurrent severe without psychotic features (HCC)    2. Generalized anxiety disorder        Strengths/Personal Resources for Self-Care: ability to understand psychiatric illness, general fund of knowledge, motivation for treatment, ability to negotiate basic needs    Area/Areas of need: anxiety symptoms, depressive symptoms, lack of sleep    Long Term Goal: improve depression  Target Date: 6 months - January 15, 2026  Person/Persons responsible for completion of goal: Kimberly TERRAZAS and Jyotsna Garnica MD     Short Term Objective (s) - How will we reach this goal?:   Take medications as prescribed  Attend psychiatry appointments regularly  Practice coping skills  Journaling  Target Date: 6 months - January 15, 2026  Person/Persons Responsible for Completion of Goal: Kimberly TERRAZAS     Progress Towards Goals: Continuing treatment    Treatment Modality: medication management every 1-3 months as needed  Review due 180 days from date of this plan: January 11, 2026   Expected length of service: Ongoing treatment    My physician and I have developed this plan together, and I agree to work on the goals and objectives. I understand the treatment goals that were developed for my treatment.    The treatment plan was created between Jyotsna Garnica MD and Kimberly Montanez on 07/15/25 at 8:33 AM.

## 2025-07-16 ENCOUNTER — TELEPHONE (OUTPATIENT)
Dept: PSYCHIATRY | Facility: CLINIC | Age: 65
End: 2025-07-16

## 2025-07-16 ENCOUNTER — TELEPHONE (OUTPATIENT)
Age: 65
End: 2025-07-16

## 2025-07-16 NOTE — TELEPHONE ENCOUNTER
Spoke with the pharmacist at [facility]. Citalopram was filled 7/11/25. Fluoxetine will need a Prior Authorization. (See other Encounter sent to the PA Pod for submission.)     Spoke with Karen and reviewed above. Reviewed PA process and possible time for a decision to be received. She verbalized understanding. Karen will continue taking citalopram until she is able to get fluoxetine.

## 2025-07-16 NOTE — TELEPHONE ENCOUNTER
Per other Telephone Encounter, Karen is having trouble getting her fluoxetine prescription filled.     Spoke with the pharmacist at [facility]. Citalopram was filled 7/11/25. This is being tapered and discontinued so Karen can start fluoxetine. Fluoxetine 10 mg needs prior authorization.     [facility]  ID [id number]

## 2025-07-17 ENCOUNTER — OFFICE VISIT (OUTPATIENT)
Dept: PHYSICAL THERAPY | Facility: HOME HEALTHCARE | Age: 65
End: 2025-07-17
Attending: INTERNAL MEDICINE
Payer: COMMERCIAL

## 2025-07-17 DIAGNOSIS — M25.511 BILATERAL SHOULDER PAIN, UNSPECIFIED CHRONICITY: Primary | ICD-10-CM

## 2025-07-17 DIAGNOSIS — M25.512 BILATERAL SHOULDER PAIN, UNSPECIFIED CHRONICITY: Primary | ICD-10-CM

## 2025-07-17 DIAGNOSIS — M17.0 PRIMARY OSTEOARTHRITIS OF BOTH KNEES: ICD-10-CM

## 2025-07-17 PROCEDURE — 97110 THERAPEUTIC EXERCISES: CPT

## 2025-07-17 NOTE — PROGRESS NOTES
"PT Re-Evaluation     Today's date: 2025  Patient name: Kimberly Montanez  : 1960  MRN: [id number]  Referring provider: Batsheva Gomes DO  Dx:   Encounter Diagnosis     ICD-10-CM    1. Bilateral shoulder pain, unspecified chronicity  M25.511     M25.512       2. Primary osteoarthritis of both knees  M17.0           Start Time: 08  Stop Time: 0910  Total time in clinic (min): 45 minutes    Assessment  Impairments: abnormal or restricted ROM, abnormal movement, activity intolerance, impaired physical strength, lacks appropriate home exercise program, pain with function, scapular dyskinesis, weight-bearing intolerance, poor posture , unable to perform ADL, activity limitations and endurance    Assessment details: UPDATE:  Pt without significant changes in pain report and ROM since last RE. PT spoke to pt about finishing out next 2 weeks of scheduled appt with discharge at that time.  PT advised pt to return to MD to discuss alternative treatment options as therapy is not addressing pain.  She is maintaining mobility at this time but not making further progression with POC. Thank you.      Pt Kimberly Montanez is a 64 y.o. who presents to OPPT with s/s consistent with B shoulder and B knee pain due to OA. Pt presents with significant mobility deficits in B shoulders/B knee, decreased LE/UE strength, postural dysfunction, impaired soft tissue mobility, and pain with functional activities. Pt reporting tolerance < 10 minutes to standing/walking, modifications to washing, dressing, cooking, cleaning, and disrupted sleep due to deficits. Pt would benefit from skilled therapy services to address outlined impairments, work towards goals, and restore pts PLOF. Thank you!   Understanding of Dx/Px/POC: good     Prognosis: good    Goals  STGs to be achieved in 4 weeks:  -Pt to demonstrate reduced subjective pain rating \"at worst\" by at least 2-3 points from Initial Eval to allow for reduced pain with ADLs and " improved functional activity tolerance. - Ongoing   -Pt to demonstrate B knee ROM improved > 20* flexion in order to maximize joint mobility and function and allow for progression of exercise program and achievement of goals. - MET   -Pt to demonstrate B shoulder ROM improved > 20* flexion/abduction in order to maximize joint mobility and function and allow for progression of exercise program and achievement of goals. - progressing but not met   -Pt to demonstrate increased MMT of B UE/LE  by at least 1/2 grade in order to improve safety and stability with ADLs and functional mobility. - progressing but not met     LTGs to be achieved upon discharge:   -Pt will be I with HEP in order to continue to improve quality of life and independence and reduce risk for re-injury. - Met   -Pt to demonstrate return to activities of daily living without limiations or restrictions. - progressing but not met   -Pt will return to lifting/carrying > 5# to help facilitate return to community activities independently - not met   -Pt to demonstrate return to ambulation with AD > 20 minutes to return to community based activities without limitations. - progressing but not fully met   -Pt to demonstrate improved function as noted by achieving or exceeding predicted score on FOTO outcomes assessment tool. - not met       Plan  Patient would benefit from: skilled physical therapy  Planned modality interventions: thermotherapy: hydrocollator packs    Planned therapy interventions: manual therapy, neuromuscular re-education, patient education, postural training, therapeutic exercise, therapeutic activities, home exercise program, flexibility and functional ROM exercises    Frequency: 2x week  Duration in weeks: 4  Plan of Care beginning date: 7/17/2025  Plan of Care expiration date: 8/14/2025  Treatment plan discussed with: patient and referring physician        Subjective Evaluation    History of Present Illness  Mechanism of injury: UPDATE:   Pt reporting that she is seeing improvement with therapy and would like to continue. She will return to see MD again in August.     Pt reporting that she has been having knee and shoulder pain since the fall.  She had x-rays taken which were (+) for severe joint OA B knees and mild to moderate OA B shoulders. Pt ended up in ED on 25 due to severe pain and immobility.  PCP follow up with referral to OPPT for conservative management of s/s.     Quality of life: good    Patient Goals  Patient goals for therapy: decreased pain, improved balance, increased motion, increased strength and independence with ADLs/IADLs    Pain  At best pain ratin  At worst pain ratin  Quality: dull ache, grinding, tight and throbbing  Relieving factors: medications  Aggravating factors: standing, walking, stair climbing, lifting and overhead activity  Progression: no change    Social Support  Steps to enter house: yes      Diagnostic Tests  X-ray: abnormal  Treatments  Current treatment: physical therapy        Objective     Active Range of Motion   Left Shoulder   Flexion: 120 degrees   Abduction: 75 degrees   External rotation 0°: Left shoulder active external rotation at 0 degrees: 50%   Internal rotation 0°: WFL    Right Shoulder   Flexion: 112 degrees   Abduction: 82 degrees   External rotation 0°: Right shoulder active external rotation at 0 degrees: 50%   Internal rotation 0°: WFL  Left Knee   Flexion: 90 degrees   Extension: 0 degrees     Right Knee   Flexion: 95 degrees   Extension: 0 degrees     Strength/Myotome Testing     Left Shoulder     Planes of Motion   Flexion: 3-   Abduction: 3-   External rotation at 0°: 3   Internal rotation at 0°: 3+     Right Shoulder     Planes of Motion   Flexion: 3-   Abduction: 3-   External rotation at 0°: 3   Internal rotation at 0°: 3+     Left Knee   Flexion: 3+  Extension: 3    Right Knee   Flexion: 3+  Extension: 3    Ambulation   Weight-Bearing Status   Assistive device used:  wheeled walker    Additional Weight-Bearing Status Details  Tolerance x 10 minutes with AD     Ambulation: Stairs   Pattern: non-reciprocal  Railings: one rail  Pattern: non-reciprocal  Railings: one rail    Observational Gait   Gait: antalgic   Decreased walking speed and stride length.     Functional Assessment        Comments  Standing tolerance < 10 minutes  Sitting for cooking/dishes   Use of shower chair   Lifting tolerance < 5#   Reaching ability - shoulder height only

## 2025-07-17 NOTE — PROGRESS NOTES
Daily Note     Today's date: 2025  Patient name: Kimberly Montanez  : 1960  MRN: [id number]  Referring provider: Batsheva Gomes DO  Dx:   Encounter Diagnosis     ICD-10-CM    1. Bilateral shoulder pain, unspecified chronicity  M25.511     M25.512       2. Primary osteoarthritis of both knees  M17.0                      Subjective: Pt reports she has pain in her shoulders and grinding in her knees.       Objective: See treatment diary below      Assessment: Tolerated treatment session. See re-eval today. Pt reported her shoulders felt achy at end of session. Pt with less difficulty with step up exercise today.  Patient would benefit from continued PT      Plan: Continue per plan of care.        Visit Count 9 1 RE 6 7 8      Manuals 7/14   7/17 6/30 7/7 7/10                   Neuro Re-Ed                         Ther Ex        NuStep for ROM and conditioning  L3 10 minutes  L3 10 minutes  L3 x 10' L3 x 10 '  L3 10 min    HR/TR X 20  ea X 20  30x ea X 30 ea  X 20 ea   Standing marches  1# x 20  1# x 20  1# 20x 1# x 20 1# x 20   Standing hip flex  1# x 20  1# x 20  1# 20x 1# x 20  1# x 20    Standing hip abd  1# x 20  1#  x 20  1# 20x 1# x 20  1# x 20   Walking with 1# weights  1 lap around the gym   1# 1 lap around the gym   1# 1 lap around the gym 1# B/L 1 lap around gym 1# prem  Walking 1 lap around gym with 1#    SAQ    20x B/L     Supine SLR X 10     10x spread out from 1st set  X 10     X 10 spread out from 1st set  10x     10x spread out from 1st set X 10     10x spread out from 1st set  X 10     10x spread out from 1st set    Shld shrugs  X 20 X 20  20x  X 20  X 20   Retractions  X 20  X 20 20x  X 20  X 20   Pulleys: flex/scap  Flex/Scap   X 20 ea prem  Flex/scap   X 10 ea prem  Flex/scap  10x ea B/L Flex/Scap   L side x 20   R side x 10  Flex/scap   L side x 20  R side x 10    Elbow curls  2# x 20  2# x 20   2# x 20  2# x 20   Cane flex Seated x 20    Seated x 20  Seated 20x within pain free RO  Seated x 20    Seated x 20   Incline slider  0#   2x10 fwd Luis Angel  0#  2x10 fwd Luis Angel  0#   X 10 fwd luis angel  0#   2 X 10 fwd Luis Angel    Hip abd  Supine   Yellow   X 20  Supine   Yellow x 20  Seated YTB 20x   Supine YTB   X 20  Seated  YTB  X20    Hip add  X 20  X 20  Seated 20x X 20  X20   Step ups  Fwd B   1x 10 Luis Angel Fwd B  1x10 Luis Angel    Fwd B   1x10 Luis Angel            Ther Activity                        Gait Training                        Modalities                                     Odomzo Counseling- I discussed with the patient the risks of Odomzo including but not limited to nausea, vomiting, diarrhea, constipation, weight loss, changes in the sense of taste, decreased appetite, muscle spasms, and hair loss.  The patient verbalized understanding of the proper use and possible adverse effects of Odomzo.  All of the patient's questions and concerns were addressed.

## 2025-07-21 ENCOUNTER — APPOINTMENT (OUTPATIENT)
Dept: PHYSICAL THERAPY | Facility: HOME HEALTHCARE | Age: 65
End: 2025-07-21
Attending: INTERNAL MEDICINE
Payer: COMMERCIAL

## 2025-07-22 DIAGNOSIS — F33.2 MAJOR DEPRESSIVE DISORDER, RECURRENT SEVERE WITHOUT PSYCHOTIC FEATURES (HCC): ICD-10-CM

## 2025-07-22 DIAGNOSIS — F41.1 GENERALIZED ANXIETY DISORDER: ICD-10-CM

## 2025-07-23 RX ORDER — CITALOPRAM HYDROBROMIDE 40 MG/1
40 TABLET ORAL DAILY
Qty: 30 TABLET | OUTPATIENT
Start: 2025-07-23

## 2025-07-24 ENCOUNTER — APPOINTMENT (OUTPATIENT)
Dept: PHYSICAL THERAPY | Facility: HOME HEALTHCARE | Age: 65
End: 2025-07-24
Attending: INTERNAL MEDICINE
Payer: COMMERCIAL

## 2025-07-24 NOTE — TELEPHONE ENCOUNTER
PA for Fluoxetine 10 mg SUBMITTED to [employer]     via    [x]CMM-KEY: [id number]  []Surescripts-Case ID #   []Availity-Auth ID # NDC #   []Faxed to plan   []Other website   []Phone call Case ID #     []PA sent as URGENT    All office notes, labs and other pertaining documents and studies sent. Clinical questions answered. Awaiting determination from insurance company.     Turnaround time for your insurance to make a decision on your Prior Authorization can take 7-21 business days.

## 2025-07-24 NOTE — TELEPHONE ENCOUNTER
PA for Fluoxetine HCL 10 mg  NOT REQUIRED     Reason (screenshot if applicable)          Patient advised by          [x] MyChart Message  [] Phone call   []LMOM  []L/M to call office as no active Communication consent on file  []Unable to leave detailed message as VM not approved on Communication consent       Pharmacy advised by    [x]Fax  []Phone call

## 2025-07-28 ENCOUNTER — OFFICE VISIT (OUTPATIENT)
Dept: PHYSICAL THERAPY | Facility: HOME HEALTHCARE | Age: 65
End: 2025-07-28
Attending: INTERNAL MEDICINE
Payer: COMMERCIAL

## 2025-07-28 DIAGNOSIS — M25.512 BILATERAL SHOULDER PAIN, UNSPECIFIED CHRONICITY: Primary | ICD-10-CM

## 2025-07-28 DIAGNOSIS — M17.0 PRIMARY OSTEOARTHRITIS OF BOTH KNEES: ICD-10-CM

## 2025-07-28 DIAGNOSIS — M25.511 BILATERAL SHOULDER PAIN, UNSPECIFIED CHRONICITY: Primary | ICD-10-CM

## 2025-07-28 PROCEDURE — 97110 THERAPEUTIC EXERCISES: CPT

## 2025-07-29 ENCOUNTER — OFFICE VISIT (OUTPATIENT)
Age: 65
End: 2025-07-29

## 2025-07-29 DIAGNOSIS — F33.2 MAJOR DEPRESSIVE DISORDER, RECURRENT SEVERE WITHOUT PSYCHOTIC FEATURES (HCC): ICD-10-CM

## 2025-07-29 DIAGNOSIS — F41.1 GAD (GENERALIZED ANXIETY DISORDER): ICD-10-CM

## 2025-07-29 DIAGNOSIS — F33.2 SEVERE EPISODE OF RECURRENT MAJOR DEPRESSIVE DISORDER, WITHOUT PSYCHOTIC FEATURES (HCC): ICD-10-CM

## 2025-07-29 DIAGNOSIS — F41.1 GENERALIZED ANXIETY DISORDER: ICD-10-CM

## 2025-07-29 PROCEDURE — PBNCHG PB NO CHARGE PLACEHOLDER

## 2025-07-29 RX ORDER — FLUOXETINE 10 MG/1
CAPSULE ORAL
Qty: 21 CAPSULE | Refills: 0 | Status: SHIPPED | OUTPATIENT
Start: 2025-07-29 | End: 2025-07-30 | Stop reason: SDUPTHER

## 2025-07-29 RX ORDER — DULOXETIN HYDROCHLORIDE 20 MG/1
20 CAPSULE, DELAYED RELEASE ORAL 2 TIMES DAILY
Qty: 180 CAPSULE | Refills: 2 | Status: SHIPPED | OUTPATIENT
Start: 2025-07-29

## 2025-07-29 RX ORDER — DULOXETIN HYDROCHLORIDE 20 MG/1
20 CAPSULE, DELAYED RELEASE ORAL 2 TIMES DAILY
Qty: 60 CAPSULE | Refills: 2 | Status: SHIPPED | OUTPATIENT
Start: 2025-07-29 | End: 2025-07-29

## 2025-07-29 RX ORDER — CITALOPRAM HYDROBROMIDE 20 MG/1
20 TABLET ORAL DAILY
Qty: 7 TABLET | Refills: 0 | Status: SHIPPED | OUTPATIENT
Start: 2025-07-29 | End: 2025-08-05

## 2025-07-30 ENCOUNTER — TELEPHONE (OUTPATIENT)
Age: 65
End: 2025-07-30

## 2025-07-30 DIAGNOSIS — F41.1 GENERALIZED ANXIETY DISORDER: ICD-10-CM

## 2025-07-30 DIAGNOSIS — F33.2 MAJOR DEPRESSIVE DISORDER, RECURRENT SEVERE WITHOUT PSYCHOTIC FEATURES (HCC): ICD-10-CM

## 2025-07-30 RX ORDER — FLUOXETINE 10 MG/1
CAPSULE ORAL
Qty: 21 CAPSULE | Refills: 0 | Status: SHIPPED | OUTPATIENT
Start: 2025-07-30 | End: 2025-08-13

## 2025-07-31 ENCOUNTER — OFFICE VISIT (OUTPATIENT)
Dept: PHYSICAL THERAPY | Facility: HOME HEALTHCARE | Age: 65
End: 2025-07-31
Attending: INTERNAL MEDICINE
Payer: COMMERCIAL

## 2025-07-31 DIAGNOSIS — M17.0 PRIMARY OSTEOARTHRITIS OF BOTH KNEES: ICD-10-CM

## 2025-07-31 DIAGNOSIS — M25.512 BILATERAL SHOULDER PAIN, UNSPECIFIED CHRONICITY: Primary | ICD-10-CM

## 2025-07-31 DIAGNOSIS — M25.511 BILATERAL SHOULDER PAIN, UNSPECIFIED CHRONICITY: Primary | ICD-10-CM

## 2025-07-31 PROCEDURE — 97110 THERAPEUTIC EXERCISES: CPT

## 2025-08-12 ENCOUNTER — OFFICE VISIT (OUTPATIENT)
Age: 65
End: 2025-08-12

## 2025-08-20 ENCOUNTER — RESULTS FOLLOW-UP (OUTPATIENT)
Dept: FAMILY MEDICINE CLINIC | Facility: CLINIC | Age: 65
End: 2025-08-20